# Patient Record
Sex: MALE | Race: WHITE | NOT HISPANIC OR LATINO | Employment: OTHER | URBAN - NONMETROPOLITAN AREA
[De-identification: names, ages, dates, MRNs, and addresses within clinical notes are randomized per-mention and may not be internally consistent; named-entity substitution may affect disease eponyms.]

---

## 2018-07-18 ENCOUNTER — CLINICAL SUPPORT (OUTPATIENT)
Dept: CARDIOLOGY | Facility: CLINIC | Age: 48
End: 2018-07-18

## 2018-07-18 VITALS
HEART RATE: 82 BPM | SYSTOLIC BLOOD PRESSURE: 155 MMHG | OXYGEN SATURATION: 97 % | HEIGHT: 67 IN | WEIGHT: 216.4 LBS | BODY MASS INDEX: 33.97 KG/M2 | DIASTOLIC BLOOD PRESSURE: 84 MMHG

## 2018-07-18 DIAGNOSIS — G89.4 CHRONIC PAIN SYNDROME: Primary | ICD-10-CM

## 2018-07-18 DIAGNOSIS — Z79.891 ADMISSION FOR LONG-TERM OPIATE ANALGESIC USE: ICD-10-CM

## 2018-07-18 PROCEDURE — 93000 ELECTROCARDIOGRAM COMPLETE: CPT | Performed by: PHYSICIAN ASSISTANT

## 2018-07-18 NOTE — PROGRESS NOTES
PATIENT PRESENTED IN OFFICE WITH EKG ORDER FROM DR JOSE MANUEL HARPER AT [facility] FOR PAIN MANAGEMENT.

## 2018-07-18 NOTE — PROGRESS NOTES
Min Dipesh  1970 7/18/2018   ?   Chief Complaint   Patient presents with   • Medical Clearance     NURSE VISIT FOR EKG PER DR JOSE MANUEL HARPER   • Pain      ?   HPI:     PATIENT PRESENTED IN OFFICE AS A NURSE VISIT  EKG. ORDER REQUESTED BY DR JOSE MANUEL HARPER FROM [facility]. PATIENT DENIES HAVING ANY CHEST PAIN, PALPS OR SHORTNESS OF BREATH. EKG DONE AS ORDERED AND TO BE REVIEWED BY YAMILEX NIEVES PA-C.  VENU Shultz     ?   ?   ?   No current outpatient prescriptions on file.   ?   ?   Patient has no allergy information on record.       Procedures     ?   Assessment/Plan    ?   ?1. MEDICAL CLEARANCE, PAIN MANAGEMENT      2. CHRONIC PAIN SYNDROME      EKG REVIEWED BY IVON TATE AND FAXED TO DR JOSE MANUEL HARPER. VENU Shultz  ?

## 2020-02-06 ENCOUNTER — CLINICAL SUPPORT (OUTPATIENT)
Dept: CARDIOLOGY | Facility: CLINIC | Age: 50
End: 2020-02-06

## 2020-02-06 VITALS
HEIGHT: 67 IN | HEART RATE: 88 BPM | SYSTOLIC BLOOD PRESSURE: 186 MMHG | DIASTOLIC BLOOD PRESSURE: 92 MMHG | WEIGHT: 210 LBS | OXYGEN SATURATION: 97 % | BODY MASS INDEX: 32.96 KG/M2

## 2020-02-06 DIAGNOSIS — I10 HYPERTENSION, UNSPECIFIED TYPE: Primary | ICD-10-CM

## 2020-02-06 PROCEDURE — 93000 ELECTROCARDIOGRAM COMPLETE: CPT | Performed by: PHYSICIAN ASSISTANT

## 2020-02-06 RX ORDER — LISINOPRIL 5 MG/1
5 TABLET ORAL 2 TIMES DAILY
COMMUNITY
Start: 2020-01-16 | End: 2022-01-05

## 2020-02-06 RX ORDER — MYCOPHENOLATE MOFETIL 250 MG/1
500 CAPSULE ORAL 2 TIMES DAILY
COMMUNITY
Start: 2020-01-16 | End: 2022-04-19

## 2020-02-06 RX ORDER — INSULIN DEGLUDEC 200 U/ML
50 INJECTION, SOLUTION SUBCUTANEOUS DAILY
COMMUNITY
Start: 2019-12-20 | End: 2023-01-26

## 2020-02-06 RX ORDER — OXYCODONE HYDROCHLORIDE AND ACETAMINOPHEN 5; 325 MG/1; MG/1
TABLET ORAL AS NEEDED
COMMUNITY
Start: 2020-01-14

## 2020-02-06 RX ORDER — ACYCLOVIR 800 MG/1
800 TABLET ORAL 2 TIMES DAILY
COMMUNITY
Start: 2020-02-01 | End: 2022-01-05

## 2020-02-06 RX ORDER — FENOFIBRATE 160 MG/1
160 TABLET ORAL DAILY
COMMUNITY
Start: 2020-01-16

## 2020-02-06 RX ORDER — FLUDROCORTISONE ACETATE 0.1 MG/1
0.1 TABLET ORAL DAILY
COMMUNITY
Start: 2020-01-24 | End: 2022-01-05

## 2020-02-06 RX ORDER — SODIUM BICARBONATE 650 MG/1
650 TABLET ORAL 2 TIMES DAILY
COMMUNITY
Start: 2019-11-03 | End: 2022-01-05

## 2020-02-06 RX ORDER — TACROLIMUS 1 MG/1
0.5 CAPSULE ORAL 2 TIMES DAILY
COMMUNITY
Start: 2020-01-16

## 2020-02-06 RX ORDER — ROSUVASTATIN CALCIUM 10 MG/1
10 TABLET, COATED ORAL DAILY
COMMUNITY
Start: 2019-11-24 | End: 2022-01-05 | Stop reason: SDUPTHER

## 2020-02-06 RX ORDER — PREDNISONE 1 MG/1
5 TABLET ORAL EVERY OTHER DAY
COMMUNITY
Start: 2020-01-16

## 2020-02-06 RX ORDER — ESCITALOPRAM OXALATE 20 MG/1
TABLET ORAL DAILY
COMMUNITY
Start: 2019-11-19 | End: 2022-01-05

## 2020-02-06 RX ORDER — INSULIN ASPART 100 [IU]/ML
10 INJECTION, SOLUTION INTRAVENOUS; SUBCUTANEOUS
COMMUNITY
Start: 2019-12-20 | End: 2023-01-26

## 2020-02-06 RX ORDER — AMLODIPINE BESYLATE 5 MG/1
5 TABLET ORAL DAILY
COMMUNITY
Start: 2020-01-16 | End: 2022-01-05

## 2020-02-06 NOTE — PROGRESS NOTES
Min Landon  1970 2/6/2020   ?   Chief Complaint   Patient presents with   • nurse visit     EKG for [facility]      ?   HPI:   ? Patient presents for EKG for [facility]. EKG to be shown to Jan BLACKMAN and interpreted. Patient has no complaints today. Li Flannery LPN  ?   ?     Current Outpatient Medications:   •  acyclovir (ZOVIRAX) 800 MG tablet, Take 800 mg by mouth 2 (Two) Times a Day., Disp: , Rfl:   •  amLODIPine (NORVASC) 5 MG tablet, Take 5 mg by mouth Daily., Disp: , Rfl:   •  diclofenac (VOLTAREN) 1 % gel gel, As Needed., Disp: , Rfl:   •  escitalopram (LEXAPRO) 20 MG tablet, Daily., Disp: , Rfl:   •  fenofibrate 160 MG tablet, Take 160 mg by mouth Daily., Disp: , Rfl:   •  fludrocortisone 0.1 MG tablet, Take 0.1 mg by mouth Daily., Disp: , Rfl:   •  lisinopril (PRINIVIL,ZESTRIL) 5 MG tablet, Take 5 mg by mouth 2 (Two) Times a Day., Disp: , Rfl:   •  mycophenolate (CELLCEPT) 250 MG capsule, Take 250 mg by mouth 2 (Two) Times a Day. 2 caps bid, Disp: , Rfl:   •  NOVOLOG FLEXPEN 100 UNIT/ML solution pen-injector sc pen, 10 Units. And sliding scale, Disp: , Rfl:   •  oxyCODONE-acetaminophen (PERCOCET) 5-325 MG per tablet, As Needed., Disp: , Rfl:   •  predniSONE (DELTASONE) 5 MG tablet, Take 5 mg by mouth Every Other Day., Disp: , Rfl:   •  rosuvastatin (CRESTOR) 10 MG tablet, Take 10 mg by mouth Daily., Disp: , Rfl:   •  sodium bicarbonate 650 MG tablet, Take 650 mg by mouth 2 (Two) Times a Day., Disp: , Rfl:   •  tacrolimus (PROGRAF) 1 MG capsule, Take 1 mg by mouth 2 (Two) Times a Day., Disp: , Rfl:   •  TRESIBA FLEXTOUCH 200 UNIT/ML solution pen-injector pen injection, 50 Units Daily., Disp: , Rfl:    ?   ?   Sulfamethoxazole-trimethoprim         ECG 12 Lead  Date/Time: 2/6/2020 4:52 PM  Performed by: Jan Wilson PA  Authorized by: Jan Wilson PA   Comparison: compared with previous ECG from 7/18/2018               ?    Assessment/Plan    ?1. EKG    EKG reviewed and interpreted by Jan BLACKMAN, faxed to [facility] Fax number [phone] Phone number [phone].  Li Flannery LPN  ?   ?

## 2022-01-05 ENCOUNTER — OFFICE VISIT (OUTPATIENT)
Dept: CARDIOLOGY | Facility: CLINIC | Age: 52
End: 2022-01-05

## 2022-01-05 VITALS
WEIGHT: 178.2 LBS | HEIGHT: 67 IN | HEART RATE: 102 BPM | SYSTOLIC BLOOD PRESSURE: 137 MMHG | DIASTOLIC BLOOD PRESSURE: 84 MMHG | BODY MASS INDEX: 27.97 KG/M2 | OXYGEN SATURATION: 97 %

## 2022-01-05 DIAGNOSIS — R06.02 SOB (SHORTNESS OF BREATH): ICD-10-CM

## 2022-01-05 DIAGNOSIS — R94.39 ABNORMAL STRESS TEST: Primary | ICD-10-CM

## 2022-01-05 DIAGNOSIS — R00.2 PALPITATIONS: ICD-10-CM

## 2022-01-05 DIAGNOSIS — I10 ESSENTIAL HYPERTENSION: ICD-10-CM

## 2022-01-05 PROCEDURE — 99204 OFFICE O/P NEW MOD 45 MIN: CPT | Performed by: NURSE PRACTITIONER

## 2022-01-05 RX ORDER — ASPIRIN 81 MG/1
81 TABLET ORAL DAILY
COMMUNITY
End: 2022-02-17 | Stop reason: SDUPTHER

## 2022-01-05 RX ORDER — METOPROLOL SUCCINATE 25 MG/1
12.5 TABLET, EXTENDED RELEASE ORAL DAILY
Qty: 15 TABLET | Refills: 11 | Status: SHIPPED | OUTPATIENT
Start: 2022-01-05 | End: 2022-04-19

## 2022-01-05 RX ORDER — ROSUVASTATIN CALCIUM 20 MG/1
20 TABLET, COATED ORAL DAILY
Qty: 90 TABLET | Refills: 3 | Status: SHIPPED | OUTPATIENT
Start: 2022-01-05 | End: 2022-11-03

## 2022-01-05 RX ORDER — PANTOPRAZOLE SODIUM 40 MG/1
40 TABLET, DELAYED RELEASE ORAL DAILY
COMMUNITY
Start: 2021-12-20

## 2022-01-05 RX ORDER — CLOPIDOGREL BISULFATE 75 MG/1
75 TABLET ORAL DAILY
Qty: 30 TABLET | Refills: 11 | Status: SHIPPED | OUTPATIENT
Start: 2022-01-05 | End: 2022-12-05

## 2022-01-05 NOTE — PATIENT INSTRUCTIONS
Acute Coronary Syndrome  Acute coronary syndrome (ACS) is a serious problem in which there is suddenly not enough blood and oxygen reaching the heart. ACS can result in chest pain or a heart attack.  This condition is a medical emergency. If you have any symptoms of this condition, get help right away.  What are the causes?  This condition may be caused by:  · A buildup of fat and cholesterol inside the arteries (atherosclerosis). This is the most common cause. The buildup (plaque) can cause blood vessels in the heart (coronary arteries) to become narrow or blocked, which reduces blood flow to the heart. Plaque can also break off and lead to a clot, which can block an artery and cause a heart attack or stroke.  · Sudden tightening of the muscles around the coronary arteries (coronary spasm).  · Tearing of a coronary artery (spontaneous coronary artery dissection).  · Very low blood pressure (hypotension).  · An abnormal heartbeat (arrhythmia).  · Other medical conditions that cause a decrease of oxygen to the heart, such as anemiaorrespiratory failure.  · Using cocaine or methamphetamine.  What increases the risk?  The following factors may make you more likely to develop this condition:  · Age. The risk for ACS increases as you get older.  · History of chest pain, heart attack, peripheral artery disease, or stroke.  · Having taken chemotherapy or immune-suppressing medicines.  · Being male.  · Family history of chest pain, heart disease, or stroke.  · Smoking.  · Not exercising enough.  · Being overweight.  · High cholesterol.  · High blood pressure (hypertension).  · Diabetes.  · Excessive alcohol use.  What are the signs or symptoms?  Common symptoms of this condition include:  · Chest pain. The pain may last a long time, or it may stop and come back (recur). It may feel like:  ? Crushing or squeezing.  ? Tightness, pressure, fullness, or heaviness.  · Arm, neck, jaw, or back pain.  · Heartburn or  indigestion.  · Shortness of breath.  · Nausea.  · Sudden cold sweats.  · Light-headedness.  · Dizziness or passing out.  · Tiredness (fatigue).  Sometimes there are no symptoms.  How is this diagnosed?  This condition may be diagnosed based on:  · Your medical history and symptoms.  · Imaging tests, such as:  ? An electrocardiogram (ECG). This measures the heart's electrical activity.  ? X-rays.  ? CT scan.  ? A coronary angiogram. For this test, dye is injected into the heart arteries and then X-rays are taken.  ? Myocardial perfusion imaging. This test shows how well blood flows through your heart muscle.  · Blood tests. These may be repeated at certain time intervals.  · Exercise stress testing.  · Echocardiogram. This is a test that uses sound waves to produce detailed images of the heart.  How is this treated?  Treatment for this condition may include:  · Oxygen therapy.  · Medicines, such as:  ? Antiplatelet medicines and blood-thinning medicines, such as aspirin. These help prevent blood clots.  ? Medicine that dissolves any blood clots (fibrinolytic therapy).  ? Blood pressure medicines.  ? Nitroglycerin. This helps widen blood vessels to improve blood flow.  ? Pain medicine.  ? Cholesterol-lowering medicine.  · Surgery, such as:  ? Coronary angioplasty with stent placement. This involves placing a small piece of metal that looks like mesh or a spring into a narrow coronary artery. This widens the artery and keeps it open.  ? Coronary artery bypass surgery. This involves taking a section of a blood vessel from a different part of your body and placing it on the blocked coronary artery to allow blood to flow around the blockage.  · Cardiac rehabilitation. This is a program that includes exercise training, education, and counseling to help you recover.  Follow these instructions at home:  Eating and drinking  · Eat a heart-healthy diet that includes whole grains, fruits and vegetables, lean proteins, and  low-fat or nonfat dairy products.  · Limit how much salt (sodium) you eat as told by your health care provider. Follow instructions from your health care provider about any other eating or drinking restrictions, such as limiting foods that are high in fat and processed sugars.  · Use healthy cooking methods such as roasting, grilling, broiling, baking, poaching, steaming, or stir-frying.  · Work with a dietitian to follow a heart-healthy eating plan.  Medicines  · Take over-the-counter and prescription medicines only as told by your health care provider.  · Do not take these medicines unless your health care provider approves:  ? Vitamin supplements that contain vitamin A or vitamin E.  ? NSAIDs, such as ibuprofen, naproxen, or celecoxib.  ? Hormone replacement therapy that contains estrogen.  · If you are taking blood thinners:  ? Talk with your health care provider before you take any medicines that contain aspirin or NSAIDs. These medicines increase your risk for dangerous bleeding.  ? Take your medicine exactly as told, at the same time every day.  ? Avoid activities that could cause injury or bruising, and follow instructions about how to prevent falls.  ? Wear a medical alert bracelet, and carry a card that lists what medicines you take.  Activity  · Follow your cardiac rehabilitation program. Do exercises as told by your physical therapist.  · Ask your health care provider what activities and exercises are safe for you. Follow his or her instructions about lifting, driving, or climbing stairs.  Lifestyle  · Do not use any products that contain nicotine or tobacco, such as cigarettes, e-cigarettes, and chewing tobacco. If you need help quitting, ask your health care provider.  · Do not drink alcohol if your health care provider tells you not to drink.  · If you drink alcohol:  ? Limit how much you have to 0-1 drink a day.  ? Be aware of how much alcohol is in your drink. In the [place] one drink equals one 12 oz  bottle of beer (355 mL), one 5 oz glass of wine (148 mL), or one 1½ oz glass of hard liquor (44 mL).  · Maintain a healthy weight. If you need to lose weight, work with your health care provider to do so safely.  General instructions  · Tell all the health care providers who provide care for you about your heart condition, including your dentist. This may affect the medicines or treatment you receive.  · Manage any other health conditions you have, such as hypertension or diabetes. These conditions affect your heart.  · Pay attention to your mental health. You may be at higher risk for depression.  ? Find ways to manage stress.  ? Talk to your health care provider about depression screening and treatment.  · Keep your vaccinations up to date.  ? Get the flu shot (influenza vaccine) every year.  ? Get the pneumococcal vaccine if you are age 65 or older.  · If directed, monitor your blood pressure at home.  · Keep all follow-up visits as told by your health care provider. This is important.  Contact a health care provider if you:  · Feel overwhelmed or sad.  · Have trouble doing your daily activities.  Get help right away if you:  · Have pain in your chest, neck, arm, jaw, stomach, or back that recurs, and:  ? It lasts for more than a few minutes.  ? It is not relieved by taking the medicineyour health care provider prescribed.  · Have unexplained:  ? Heavy sweating.  ? Heartburn or indigestion.  ? Nausea or vomiting.  ? Shortness of breath.  ? Difficulty breathing.  ? Fatigue.  ? Nervousness or anxiety.  ? Weakness.  ? Diarrhea.  ? Dark stools or blood in your stool.  · Have sudden light-headedness or dizziness.  · Have blood pressure that is higher than 180/120.  · Faint.  · Have thoughts about hurting yourself.  These symptoms may represent a serious problem that is an emergency. Do not wait to see if the symptoms will go away. Get medical help right away. Call your local emergency services (911 in the [place] Do  not drive yourself to the hospital.   Summary  · Acute coronary syndrome (ACS) is when there is not enough blood and oxygen being supplied to the heart. ACS can result in chest pain or a heart attack.  · Acute coronary syndrome is a medical emergency. If you have any symptoms of this condition, get help right away.  · Treatment includes medicines and procedures to open the blocked arteries and restore blood flow.  This information is not intended to replace advice given to you by your health care provider. Make sure you discuss any questions you have with your health care provider.  Document Revised: 05/20/2020 Document Reviewed: 12/30/2019  [employer] ([employer]) Patient Education © 2021 [employer]      Coronary Angiogram With Stent  Coronary angiogram with stent placement is a procedure to widen or open a narrow blood vessel of the heart (coronary artery). Arteries may become blocked by cholesterol buildup (plaques) in the lining of the artery wall. When a coronary artery becomes partially blocked, blood flow to that area decreases. This may lead to chest pain or a heart attack (myocardial infarction).  A stent is a small piece of metal that looks like mesh or spring. Stent placement may be done as treatment after a heart attack, or to prevent a heart attack if a blocked artery is found by a coronary angiogram.  Let your health care provider know about:  · Any allergies you have, including allergies to medicines or contrast dye.  · All medicines you are taking, including vitamins, herbs, eye drops, creams, and over-the-counter medicines.  · Any problems you or family members have had with anesthetic medicines.  · Any blood disorders you have.  · Any surgeries you have had.  · Any medical conditions you have, including kidney problems or kidney failure.  · Whether you are pregnant or may be pregnant.  · Whether you are breastfeeding.  What are the risks?  Generally, this is a safe procedure. However, serious problems may  occur, including:  · Damage to nearby structures or organs, such as the heart, blood vessels, or kidneys.  · A return of blockage.  · Bleeding, infection, or bruising at the insertion site.  · A collection of blood under the skin (hematoma) at the insertion site.  · A blood clot in another part of the body.  · Allergic reaction to medicines or dyes.  · Bleeding into the abdomen (retroperitoneal bleeding).  · Stroke (rare).  · Heart attack (rare).  What happens before the procedure?  Staying hydrated  Follow instructions from your health care provider about hydration, which may include:  · Up to 2 hours before the procedure - you may continue to drink clear liquids, such as water, clear fruit juice, black coffee, and plain tea.    Eating and drinking restrictions  Follow instructions from your health care provider about eating and drinking, which may include:  · 8 hours before the procedure - stop eating heavy meals or foods, such as meat, fried foods, or fatty foods.  · 6 hours before the procedure - stop eating light meals or foods, such as toast or cereal.  · 2 hours before the procedure - stop drinking clear liquids.  Medicines  Ask your health care provider about:  · Changing or stopping your regular medicines. This is especially important if you are taking diabetes medicines or blood thinners.  · Taking medicines such as aspirin and ibuprofen. These medicines can thin your blood. Do not take these medicines unless your health care provider tells you to take them.  ? Generally, aspirin is recommended before a thin tube, called a catheter, is passed through a blood vessel and inserted into the heart (cardiac catheterization).  · Taking over-the-counter medicines, vitamins, herbs, and supplements.  General instructions  · Do not use any products that contain nicotine or tobacco for at least 4 weeks before the procedure. These products include cigarettes, e-cigarettes, and chewing tobacco. If you need help  quitting, ask your health care provider.  · Plan to have someone take you home from the hospital or clinic.  · If you will be going home right after the procedure, plan to have someone with you for 24 hours.  · You may have tests and imaging procedures.  · Ask your health care provider:  ? How your insertion site will be marked. Ask which artery will be used for the procedure.  ? What steps will be taken to help prevent infection. These may include:  § Removing hair at the insertion site.  § Washing skin with a germ-killing soap.  § Taking antibiotic medicine.  What happens during the procedure?    · An IV will be inserted into one of your veins.  · Electrodes may be placed on your chest to monitor your heart rate during the procedure.  · You will be given one or more of the following:  ? A medicine to help you relax (sedative).  ? A medicine to numb the area (local anesthetic) for catheter insertion.  · A small incision will be made for catheter insertion.  · The catheter will be inserted into an artery using a guide wire. The location may be in your groin, your wrist, or the fold of your arm (near your elbow).  · An X-ray procedure (fluoroscopy) will be used to help guide the catheter to the opening of the heart arteries.  · A dye will be injected into the catheter. X-rays will be taken. The dye helps to show where any narrowing or blockages are located in the arteries.  · Tell your health care provider if you have chest pain or trouble breathing.  · A tiny wire will be guided to the blocked spot, and a balloon will be inflated to make the artery wider.  · The stent will be expanded to crush the plaques into the wall of the vessel. The stent will hold the area open and improve the blood flow. Most stents have a drug coating to reduce the risk of the stent narrowing over time.  · The artery may be made wider using a drill, laser, or other tools that remove plaques.  · The catheter will be removed when the blood  flow improves. The stent will stay where it was placed, and the lining of the artery will grow over it.  · A bandage (dressing) will be placed on the insertion site. Pressure will be applied to stop bleeding.  · The IV will be removed.  This procedure may vary among health care providers and hospitals.  What happens after the procedure?  · Your blood pressure, heart rate, breathing rate, and blood oxygen level will be monitored until you leave the hospital or clinic.  · If the procedure is done through the leg, you will lie flat in bed for a few hours or for as long as told by your health care provider. You will be instructed not to bend or cross your legs.  · The insertion site and the pulse in your foot or wrist will be checked often.  · You may have more blood tests, X-rays, and a test that records the electrical activity of your heart (electrocardiogram, or ECG).  · Do not drive for 24 hours if you were given a sedative during your procedure.  Summary  · Coronary angiogram with stent placement is a procedure to widen or open a narrowed coronary artery. This is done to treat heart problems.  · Before the procedure, let your health care provider know about all the medical conditions and surgeries you have or have had.  · This is a safe procedure. However, some problems may occur, including damage to nearby structures or organs, bleeding, blood clots, or allergies.  · Follow your health care provider's instructions about eating, drinking, medicines, and other lifestyle changes, such as quitting tobacco use before the procedure.  This information is not intended to replace advice given to you by your health care provider. Make sure you discuss any questions you have with your health care provider.  Document Revised: 07/08/2020 Document Reviewed: 07/08/2020  [employer] Patient Education © 2021 [employer]      Hypertension, Adult  Hypertension is another name for high blood pressure. High blood pressure forces your  heart to work harder to pump blood. This can cause problems over time.  There are two numbers in a blood pressure reading. There is a top number (systolic) over a bottom number (diastolic). It is best to have a blood pressure that is below 120/80. Healthy choices can help lower your blood pressure, or you may need medicine to help lower it.  What are the causes?  The cause of this condition is not known. Some conditions may be related to high blood pressure.  What increases the risk?  · Smoking.  · Having type 2 diabetes mellitus, high cholesterol, or both.  · Not getting enough exercise or physical activity.  · Being overweight.  · Having too much fat, sugar, calories, or salt (sodium) in your diet.  · Drinking too much alcohol.  · Having long-term (chronic) kidney disease.  · Having a family history of high blood pressure.  · Age. Risk increases with age.  · Race. You may be at higher risk if you are .  · Gender. Men are at higher risk than women before age 45. After age 65, women are at higher risk than men.  · Having obstructive sleep apnea.  · Stress.  What are the signs or symptoms?  · High blood pressure may not cause symptoms. Very high blood pressure (hypertensive crisis) may cause:  ? Headache.  ? Feelings of worry or nervousness (anxiety).  ? Shortness of breath.  ? Nosebleed.  ? A feeling of being sick to your stomach (nausea).  ? Throwing up (vomiting).  ? Changes in how you see.  ? Very bad chest pain.  ? Seizures.  How is this treated?  · This condition is treated by making healthy lifestyle changes, such as:  ? Eating healthy foods.  ? Exercising more.  ? Drinking less alcohol.  · Your health care provider may prescribe medicine if lifestyle changes are not enough to get your blood pressure under control, and if:  ? Your top number is above 130.  ? Your bottom number is above 80.  · Your personal target blood pressure may vary.  Follow these instructions at home:  Eating and  drinking    · If told, follow the DASH eating plan. To follow this plan:  ? Fill one half of your plate at each meal with fruits and vegetables.  ? Fill one fourth of your plate at each meal with whole grains. Whole grains include whole-wheat pasta, brown rice, and whole-grain bread.  ? Eat or drink low-fat dairy products, such as skim milk or low-fat yogurt.  ? Fill one fourth of your plate at each meal with low-fat (lean) proteins. Low-fat proteins include fish, chicken without skin, eggs, beans, and tofu.  ? Avoid fatty meat, cured and processed meat, or chicken with skin.  ? Avoid pre-made or processed food.  · Eat less than 1,500 mg of salt each day.  · Do not drink alcohol if:  ? Your doctor tells you not to drink.  ? You are pregnant, may be pregnant, or are planning to become pregnant.  · If you drink alcohol:  ? Limit how much you use to:  § 0-1 drink a day for women.  § 0-2 drinks a day for men.  ? Be aware of how much alcohol is in your drink. In the [place] one drink equals one 12 oz bottle of beer (355 mL), one 5 oz glass of wine (148 mL), or one 1½ oz glass of hard liquor (44 mL).    Lifestyle    · Work with your doctor to stay at a healthy weight or to lose weight. Ask your doctor what the best weight is for you.  · Get at least 30 minutes of exercise most days of the week. This may include walking, swimming, or biking.  · Get at least 30 minutes of exercise that strengthens your muscles (resistance exercise) at least 3 days a week. This may include lifting weights or doing Pilates.  · Do not use any products that contain nicotine or tobacco, such as cigarettes, e-cigarettes, and chewing tobacco. If you need help quitting, ask your doctor.  · Check your blood pressure at home as told by your doctor.  · Keep all follow-up visits as told by your doctor. This is important.    Medicines  · Take over-the-counter and prescription medicines only as told by your doctor. Follow directions carefully.  · Do not  skip doses of blood pressure medicine. The medicine does not work as well if you skip doses. Skipping doses also puts you at risk for problems.  · Ask your doctor about side effects or reactions to medicines that you should watch for.  Contact a doctor if you:  · Think you are having a reaction to the medicine you are taking.  · Have headaches that keep coming back (recurring).  · Feel dizzy.  · Have swelling in your ankles.  · Have trouble with your vision.  Get help right away if you:  · Get a very bad headache.  · Start to feel mixed up (confused).  · Feel weak or numb.  · Feel faint.  · Have very bad pain in your:  ? Chest.  ? Belly (abdomen).  · Throw up more than once.  · Have trouble breathing.  Summary  · Hypertension is another name for high blood pressure.  · High blood pressure forces your heart to work harder to pump blood.  · For most people, a normal blood pressure is less than 120/80.  · Making healthy choices can help lower blood pressure. If your blood pressure does not get lower with healthy choices, you may need to take medicine.  This information is not intended to replace advice given to you by your health care provider. Make sure you discuss any questions you have with your health care provider.  Document Revised: 08/28/2019 Document Reviewed: 08/28/2019  [employer] Patient Education © 2021 [employer]

## 2022-01-05 NOTE — PROGRESS NOTES
Subjective     Min Landon is a 51 y.o. male who presents to day for Establish Care (presents as [facility] f/u abn stress), Palpitations, and Shortness of Breath.    CHIEF COMPLIANT  Chief Complaint   Patient presents with   • Establish Care     presents as [facility] f/u abn stress   • Palpitations   • Shortness of Breath       Active Problems:  Problem List Items Addressed This Visit     None      Visit Diagnoses     Abnormal stress test    -  Primary    Relevant Medications    rosuvastatin (CRESTOR) 20 MG tablet    clopidogrel (PLAVIX) 75 MG tablet    Other Relevant Orders    [facility]    CBC & Differential    Basic Metabolic Panel    SOB (shortness of breath)        Relevant Medications    rosuvastatin (CRESTOR) 20 MG tablet    clopidogrel (PLAVIX) 75 MG tablet    Other Relevant Orders    [facility]    CBC & Differential    Basic Metabolic Panel    Palpitations        Relevant Medications    rosuvastatin (CRESTOR) 20 MG tablet    clopidogrel (PLAVIX) 75 MG tablet    Other Relevant Orders    [facility]    CBC & Differential    Basic Metabolic Panel    Essential hypertension        Relevant Medications    rosuvastatin (CRESTOR) 20 MG tablet    clopidogrel (PLAVIX) 75 MG tablet    metoprolol succinate XL (TOPROL-XL) 25 MG 24 hr tablet    Other Relevant Orders    [facility]    CBC & Differential    Basic Metabolic Panel      Problem list  1.  Shortness of breath  1.1 stress test 11/21: Fixed moderate size perfusion defect lateral myocardium from the apex to the base involving portions of the anterior and inferior myocardium tiny component of reversibility at the apex of the myocardium.  Decreased wall motion at the apex and septum decreased myocardial wall thickening at the apex and mid lateral wall, EF slightly low at 48%  1.2 echocardiogram 11/21: EF 60 to 65%, elevated left atrial pressures, mild concentric left ventricular hypertrophy, mild MR and TR, trace to mild PI, RVSP 37  2.   Palpitations  3.  Diabetes mellitus  4.  Hyperlipidemia  5.  Hypertension  6.  Chronic kidney disease on dialysis     HPI  HPI  Mr. Landon is a 51-year-old male patient who is being seen today to reestablish care for cardiac evaluation due to positive stress test.  Patient did undergo a nuclear stress test at [facility] that identified a Fixed moderate size perfusion defect lateral myocardium from the apex to the base involving portions of the anterior and inferior myocardium tiny component of reversibility at the apex of the myocardium.  Decreased wall motion at the apex and septum decreased myocardial wall thickening at the apex and mid lateral wall, EF slightly low at 48%.  His echocardiogram identified an ejection fraction of 60 to 65% with diastolic dysfunction.  He did restart dialysis back in the end of October due to failing of his kidney transplant.  He says after dialysis his blood pressure dropped and he had stopped taking his self off the carvedilol over the last 2 weeks.  He says his blood pressure is dropping to 90/60 and was feeling very fatigued and weak.  During the acute phase of this he did have to have a pleurocentesis x3 where they took out 1 L off his left lung, 2 L off his right lung, suspect third occasion take another liter off the left lung.  He does report that since this that his shortness of breath has improved somewhat.  His shortness of breath usually only occurs with activity and does not occur at rest.  With the pleurocentesis his orthopnea has also resolved.  Patient also has intermittent palpitations in which she has an anxious type feeling that occurs intermittently in his chest.  It does not interfere with his activities of daily living and mainly occurs at rest.  He did have noted PVCs during his echocardiogram.  He is currently being followed by Dr. Hernández for his chronic renal disease and which requires dialysis with history of renal transplant.  He does report significant fatigue  where he gets tired easily.  The only time he experiences lightheadedness is when his blood pressure drops after dialysis.  He denies chest pain, lower extremity edema, dizziness, syncope, PND, orthopnea, strokelike symptoms.  We did review his stress test echocardiogram in detail he denied any questions.  PRIOR MEDS  Current Outpatient Medications on File Prior to Visit   Medication Sig Dispense Refill   • aspirin 81 MG EC tablet Take 81 mg by mouth Daily.     • fenofibrate 160 MG tablet Take 160 mg by mouth Daily.     • mycophenolate (CELLCEPT) 250 MG capsule Take 250 mg by mouth 2 (Two) Times a Day. 2 caps bid     • NOVOLOG FLEXPEN 100 UNIT/ML solution pen-injector sc pen 10 Units. And sliding scale     • oxyCODONE-acetaminophen (PERCOCET) 5-325 MG per tablet As Needed.     • pantoprazole (PROTONIX) 40 MG EC tablet Take 40 mg by mouth Daily.     • predniSONE (DELTASONE) 5 MG tablet Take 5 mg by mouth Every Other Day.     • tacrolimus (PROGRAF) 1 MG capsule Take 1 mg by mouth 2 (Two) Times a Day.     • TRESIBA FLEXTOUCH 200 UNIT/ML solution pen-injector pen injection 50 Units Daily.     • [DISCONTINUED] rosuvastatin (CRESTOR) 10 MG tablet Take 10 mg by mouth Daily.     • [DISCONTINUED] acyclovir (ZOVIRAX) 800 MG tablet Take 800 mg by mouth 2 (Two) Times a Day.     • [DISCONTINUED] amLODIPine (NORVASC) 5 MG tablet Take 5 mg by mouth Daily.     • [DISCONTINUED] diclofenac (VOLTAREN) 1 % gel gel As Needed.     • [DISCONTINUED] escitalopram (LEXAPRO) 20 MG tablet Daily.     • [DISCONTINUED] fludrocortisone 0.1 MG tablet Take 0.1 mg by mouth Daily.     • [DISCONTINUED] lisinopril (PRINIVIL,ZESTRIL) 5 MG tablet Take 5 mg by mouth 2 (Two) Times a Day.     • [DISCONTINUED] sodium bicarbonate 650 MG tablet Take 650 mg by mouth 2 (Two) Times a Day.       No current facility-administered medications on file prior to visit.       ALLERGIES  Sulfamethoxazole-trimethoprim    HISTORY  Past Medical History:   Diagnosis Date   •  "Chronic kidney disease    • Diabetes mellitus (HCC)    • End stage renal disease (HCC)    • Hyperlipidemia    • Hypertension        Social History     Socioeconomic History   • Marital status:        History reviewed. No pertinent family history.    Review of Systems   Constitutional: Positive for fatigue. Negative for chills, diaphoresis and fever.   HENT: Negative.    Eyes: Negative.  Negative for visual disturbance.   Respiratory: Positive for shortness of breath (with increased activity). Negative for apnea, cough, chest tightness and wheezing.    Cardiovascular: Positive for palpitations (with anxiety). Negative for chest pain and leg swelling.   Gastrointestinal: Negative.  Negative for abdominal pain, blood in stool, constipation, diarrhea, nausea and vomiting.   Endocrine: Negative.    Genitourinary: Negative.  Negative for hematuria.        Dialysis patient    Musculoskeletal: Positive for arthralgias, back pain, myalgias and neck pain.   Skin: Negative.    Allergic/Immunologic: Negative.    Neurological: Positive for light-headedness (when BP drops, the last weeks after dialysis ). Negative for dizziness, syncope, weakness, numbness and headaches.   Hematological: Negative.  Does not bruise/bleed easily.   Psychiatric/Behavioral: Negative.  Negative for sleep disturbance (toss and turn).       Objective     VITALS: /84 (BP Location: Left arm, Patient Position: Sitting)   Pulse 102   Ht 170.2 cm (67\")   Wt 80.8 kg (178 lb 3.2 oz)   SpO2 97%   BMI 27.91 kg/m²     LABS:   Lab Results (most recent)     None          IMAGING:   No Images in the past 120 days found..    EXAM:  Physical Exam  Vitals and nursing note reviewed.   Constitutional:       Appearance: He is well-developed.   HENT:      Head: Normocephalic.   Eyes:      Pupils: Pupils are equal, round, and reactive to light.   Neck:      Thyroid: No thyroid mass.      Vascular: No carotid bruit or JVD.      Trachea: Trachea and " phonation normal.   Cardiovascular:      Rate and Rhythm: Normal rate and regular rhythm.      Pulses:           Radial pulses are 2+ on the right side and 2+ on the left side.        Posterior tibial pulses are 2+ on the right side and 2+ on the left side.      Heart sounds: Murmur heard.   No friction rub. No gallop.    Pulmonary:      Effort: Pulmonary effort is normal. No respiratory distress.      Breath sounds: Normal breath sounds. No wheezing or rales.   Abdominal:      General: Bowel sounds are normal.      Palpations: Abdomen is soft.   Musculoskeletal:         General: No swelling. Normal range of motion.      Cervical back: Neck supple.   Skin:     General: Skin is warm and dry.      Capillary Refill: Capillary refill takes less than 2 seconds.      Findings: No rash.          Neurological:      Mental Status: He is alert and oriented to person, place, and time.   Psychiatric:         Speech: Speech normal.         Behavior: Behavior normal.         Thought Content: Thought content normal.         Judgment: Judgment normal.         Procedure   Procedures       Assessment/Plan    Diagnosis Plan   1. Abnormal stress test  rosuvastatin (CRESTOR) 20 MG tablet    clopidogrel (PLAVIX) 75 MG tablet    [facility]    CBC & Differential    Basic Metabolic Panel   2. SOB (shortness of breath)  rosuvastatin (CRESTOR) 20 MG tablet    clopidogrel (PLAVIX) 75 MG tablet    [facility]    CBC & Differential    Basic Metabolic Panel   3. Palpitations  rosuvastatin (CRESTOR) 20 MG tablet    clopidogrel (PLAVIX) 75 MG tablet    [facility]    CBC & Differential    Basic Metabolic Panel   4. Essential hypertension  rosuvastatin (CRESTOR) 20 MG tablet    clopidogrel (PLAVIX) 75 MG tablet    [facility]    CBC & Differential    Basic Metabolic Panel    metoprolol succinate XL (TOPROL-XL) 25 MG 24 hr tablet   1.  Patient did have an abnormal stress test that identifiedFixed moderate size  perfusion defect lateral myocardium from the apex to the base involving portions of the anterior and inferior myocardium tiny component of reversibility at the apex of the myocardium.  Decreased wall motion at the apex and septum decreased myocardial wall thickening at the apex and mid lateral wall, EF slightly low at 48%.  He does have shortness of breath that has actually gotten somewhat better after pleurocentesis.  He still has shortness of breath with activity.  Due to his abnormal stress test and history of diabetes mellitus, hypertension, hyperlipidemia I do feel it is most appropriate for patient to proceed with left heart catheterization.  Benefits and risk of the procedure were explained and patient wishes to proceed.  He was advised to have labs done approximately 1 week prior to the left heart catheterization.  These can be done in conjunction with the labs that he routinely goes under with Dr. Hernández.  Also will prescribe Plavix 75 mg daily in which she will start 7 days prior to the left heart catheterization.  We will also increase his rosuvastatin to 20 mg daily for high intensity statin therapy.  2.  Patient's blood pressure is controlled on current blood pressure medication regimen.  Due to the fact patient was unable to tolerate the carvedilol and has a reduced ejection fraction on his stress test and tachycardia with a rate of 102 today we will start patient on metoprolol succinate 12.5 mg daily.  Patient advised to monitor blood pressure on a daily basis and report any persistent highs or lows.  Set goal blood pressure for patient at 130/80 or below.  3.  Informed of signs and symptoms of ACS and advised to seek emergent treatment for any new worsening symptoms.  Patient also advised sooner follow-up as needed.  Also advised to follow-up with family doctor as needed  This note is dictated utilizing voice recognition software.  Although this record has been proof read, transcriptional errors may  still be present. If questions occur regarding the content of this record please do not hesitate to call our office.  I have reviewed and confirmed the accuracy of the ROS as documented by the MA/LPN/RN RASHAD Garza    Return if symptoms worsen or fail to improve, for cath follow up 1-2 weeks.    Diagnoses and all orders for this visit:    1. Abnormal stress test (Primary)  -     rosuvastatin (CRESTOR) 20 MG tablet; Take 1 tablet by mouth Daily.  Dispense: 90 tablet; Refill: 3  -     clopidogrel (PLAVIX) 75 MG tablet; Take 1 tablet by mouth Daily.  Dispense: 30 tablet; Refill: 11  -     [facility]; Future  -     CBC & Differential; Future  -     Basic Metabolic Panel; Future    2. SOB (shortness of breath)  -     rosuvastatin (CRESTOR) 20 MG tablet; Take 1 tablet by mouth Daily.  Dispense: 90 tablet; Refill: 3  -     clopidogrel (PLAVIX) 75 MG tablet; Take 1 tablet by mouth Daily.  Dispense: 30 tablet; Refill: 11  -     [facility]; Future  -     CBC & Differential; Future  -     Basic Metabolic Panel; Future    3. Palpitations  -     rosuvastatin (CRESTOR) 20 MG tablet; Take 1 tablet by mouth Daily.  Dispense: 90 tablet; Refill: 3  -     clopidogrel (PLAVIX) 75 MG tablet; Take 1 tablet by mouth Daily.  Dispense: 30 tablet; Refill: 11  -     [facility]; Future  -     CBC & Differential; Future  -     Basic Metabolic Panel; Future    4. Essential hypertension  -     rosuvastatin (CRESTOR) 20 MG tablet; Take 1 tablet by mouth Daily.  Dispense: 90 tablet; Refill: 3  -     clopidogrel (PLAVIX) 75 MG tablet; Take 1 tablet by mouth Daily.  Dispense: 30 tablet; Refill: 11  -     [facility]; Future  -     CBC & Differential; Future  -     Basic Metabolic Panel; Future  -     metoprolol succinate XL (TOPROL-XL) 25 MG 24 hr tablet; Take 0.5 tablets by mouth Daily.  Dispense: 15 tablet; Refill: 11               MEDS ORDERED DURING VISIT:  New Medications Ordered This Visit    Medications   • rosuvastatin (CRESTOR) 20 MG tablet     Sig: Take 1 tablet by mouth Daily.     Dispense:  90 tablet     Refill:  3   • clopidogrel (PLAVIX) 75 MG tablet     Sig: Take 1 tablet by mouth Daily.     Dispense:  30 tablet     Refill:  11   • metoprolol succinate XL (TOPROL-XL) 25 MG 24 hr tablet     Sig: Take 0.5 tablets by mouth Daily.     Dispense:  15 tablet     Refill:  11           This document has been electronically signed by Manfred Ascencio Jr., APRN  January 5, 2022 11:46 EST

## 2022-01-11 ENCOUNTER — TELEPHONE (OUTPATIENT)
Dept: CARDIOLOGY | Facility: CLINIC | Age: 52
End: 2022-01-11

## 2022-01-11 NOTE — TELEPHONE ENCOUNTER
Pt called and wanted to make sure it was okay to go ahead and start the Plavix because he thought it was 7 days prior. I told him it was fine to go ahead and start to have in his system.

## 2022-02-17 ENCOUNTER — OFFICE VISIT (OUTPATIENT)
Dept: CARDIOLOGY | Facility: CLINIC | Age: 52
End: 2022-02-17

## 2022-02-17 VITALS
BODY MASS INDEX: 29 KG/M2 | HEART RATE: 80 BPM | OXYGEN SATURATION: 99 % | HEIGHT: 67 IN | WEIGHT: 184.8 LBS | SYSTOLIC BLOOD PRESSURE: 158 MMHG | DIASTOLIC BLOOD PRESSURE: 93 MMHG

## 2022-02-17 DIAGNOSIS — I48.0 PAROXYSMAL ATRIAL FIBRILLATION: ICD-10-CM

## 2022-02-17 DIAGNOSIS — R00.2 PALPITATIONS: ICD-10-CM

## 2022-02-17 DIAGNOSIS — I25.10 CORONARY ATHEROSCLEROSIS DUE TO CALCIFIED CORONARY LESION: Primary | ICD-10-CM

## 2022-02-17 DIAGNOSIS — E78.2 MIXED HYPERLIPIDEMIA: ICD-10-CM

## 2022-02-17 DIAGNOSIS — I10 BENIGN ESSENTIAL HYPERTENSION: ICD-10-CM

## 2022-02-17 DIAGNOSIS — R06.02 SHORTNESS OF BREATH: ICD-10-CM

## 2022-02-17 DIAGNOSIS — I25.84 CORONARY ATHEROSCLEROSIS DUE TO CALCIFIED CORONARY LESION: Primary | ICD-10-CM

## 2022-02-17 PROBLEM — E78.5 HLD (HYPERLIPIDEMIA): Status: ACTIVE | Noted: 2022-02-17

## 2022-02-17 PROCEDURE — 99214 OFFICE O/P EST MOD 30 MIN: CPT | Performed by: NURSE PRACTITIONER

## 2022-02-17 RX ORDER — ISOSORBIDE MONONITRATE 30 MG/1
30 TABLET, EXTENDED RELEASE ORAL DAILY
Qty: 30 TABLET | Refills: 11 | Status: SHIPPED | OUTPATIENT
Start: 2022-02-17 | End: 2022-08-11

## 2022-02-17 RX ORDER — SEVELAMER CARBONATE 800 MG/1
800 TABLET, FILM COATED ORAL 3 TIMES DAILY
COMMUNITY
Start: 2022-02-08

## 2022-02-17 NOTE — PROGRESS NOTES
Subjective     Min Landon is a 51 y.o. male who presents to day for Follow-up (Here for [facility] f/u), Coronary Artery Disease, Hyperlipidemia, and Hypertension.    CHIEF COMPLIANT  Chief Complaint   Patient presents with   • Follow-up     Here for [facility] f/u   • Coronary Artery Disease   • Hyperlipidemia   • Hypertension     Problem list:    1.  Shortness of breath  1.1 stress test 11/21: Fixed moderate size perfusion defect lateral myocardium from the apex to the base involving portions of the anterior and inferior myocardium tiny component of reversibility at the apex of the myocardium.  Decreased wall motion at the apex and septum decreased myocardial wall thickening at the apex and mid lateral wall, EF slightly low at 48%  1.2 [facility], 1- per Dr. Saul. Unsuccessful attempt of  and revascularization of First Diag. Of the  LAD and First OM of Left Circ., distal Left Circ. 50%, PLAD 70%. Medical management  1.2 echocardiogram 1/22: EF 45 to 50% lateral hypokinesis  2.  Palpitations  3.  Diabetes mellitus  4.  Hyperlipidemia  5.  Hypertension  6.  Chronic kidney disease on dialysis  7. Atrial fibrillation [id number] score of 4, has bled score of 2    Problem List Items Addressed This Visit        Cardiac and Vasculature    Atherosclerosis of coronary artery - Primary    Relevant Medications    isosorbide mononitrate (IMDUR) 30 MG 24 hr tablet    Other Relevant Orders    Ambulatory Referral to Cardiology    Benign essential hypertension    Overview     Formatting of this note might be different from the original.    Essential (primary) hypertension         HLD (hyperlipidemia)    Palpitations       Pulmonary and Pneumonias    Shortness of breath      Other Visit Diagnoses     Paroxysmal atrial fibrillation (HCC)        Relevant Medications    apixaban (ELIQUIS) 5 MG tablet tablet    isosorbide mononitrate (IMDUR) 30 MG 24 hr tablet          HPI  Mr. Landon is a 51-year-old male patient who is being followed up  today with significant coronary artery disease in which she recently went under a left heart catheterization. Patient was found to have total occlusions to both the OM and the diagonal 1. These were attempted to be revascularized but was unsuccessful. He also had a lesion 70% in the proximal LAD that had an FFR of 0.85. The circumflex had 50% distal disease in the RCA had 20 to 40%. Patient did have a preserved ejection fraction of about 50%. He went underwent the ischemic evaluation due to the fact that after dialysis he went into a new onset atrial fibrillation. He said that he had a pretty rough episode the night before in which she had palpitations. He describes his palpitations as a racing-like sensation in his chest. They occur intermittently. He does have a [id number] score of 4 and a has bled score of 2. He does report chest pain that occurs intermittently in his chest. He also has some shortness of breath that occurs mainly with activity but can occur at rest. He also underwent under echocardiogram while he is in the hospital. It did show that his ejection fraction had reduced to 45 to 50%. He is currently on metoprolol 12.5 mg daily and is currently not on ACE and ARB due to renal function. He is on dual antiplatelet therapy of aspirin and Plavix. He is also on high intensity statin therapy of rosuvastatin 20 mg daily. He denies any lower extremity edema, dizziness, lightheadedness, syncope, PND, orthopnea, or strokelike symptoms. We did review his left heart catheterization and echocardiogram in detail and he denied any questions. We also reviewed the notes that were supplied from  his hospital stay.    PRIOR MEDS  Current Outpatient Medications on File Prior to Visit   Medication Sig Dispense Refill   • clopidogrel (PLAVIX) 75 MG tablet Take 1 tablet by mouth Daily. 30 tablet 11   • fenofibrate 160 MG tablet Take 160 mg by mouth Daily.     • metoprolol succinate XL (TOPROL-XL) 25 MG 24 hr tablet Take  0.5 tablets by mouth Daily. 15 tablet 11   • mycophenolate (CELLCEPT) 250 MG capsule Take 500 mg by mouth 2 (Two) Times a Day.     • NOVOLOG FLEXPEN 100 UNIT/ML solution pen-injector sc pen 10 Units. And sliding scale     • oxyCODONE-acetaminophen (PERCOCET) 5-325 MG per tablet As Needed.     • pantoprazole (PROTONIX) 40 MG EC tablet Take 40 mg by mouth Daily.     • predniSONE (DELTASONE) 5 MG tablet Take 5 mg by mouth Every Other Day.     • Renvela 800 MG tablet TAKE 2 TABLETS BY MOUTH THREE TIMES DAILY WITH MEALS     • rosuvastatin (CRESTOR) 20 MG tablet Take 1 tablet by mouth Daily. 90 tablet 3   • tacrolimus (PROGRAF) 1 MG capsule Take 1 mg by mouth 2 (Two) Times a Day.     • TRESIBA FLEXTOUCH 200 UNIT/ML solution pen-injector pen injection 50 Units Daily.       No current facility-administered medications on file prior to visit.       ALLERGIES  Sulfamethoxazole-trimethoprim    HISTORY  Past Medical History:   Diagnosis Date   • Calciphylaxis    • Chronic kidney disease    • Diabetes mellitus (HCC)    • End stage renal disease (HCC)    • Hyperlipidemia    • Hypertension        Social History     Socioeconomic History   • Marital status:    Tobacco Use   • Smoking status: Never Smoker   • Smokeless tobacco: Former User   Substance and Sexual Activity   • Alcohol use: Never   • Drug use: Never       History reviewed. No pertinent family history.    Review of Systems   Constitutional: Negative.    HENT: Negative.    Eyes: Positive for visual disturbance (glasses prn).   Respiratory: Positive for shortness of breath (mildly).    Cardiovascular: Positive for chest pain (occas. ) and palpitations. Negative for leg swelling.   Gastrointestinal: Negative.    Endocrine: Negative.    Genitourinary: Positive for decreased urine volume (dialysis 3 x weekly).   Musculoskeletal: Positive for arthralgias and myalgias.   Skin: Negative.    Allergic/Immunologic: Positive for environmental allergies.   Neurological:  "Negative.    Hematological: Negative.    Psychiatric/Behavioral: Positive for sleep disturbance.       Objective     VITALS: /93 (BP Location: Left arm, Patient Position: Sitting)   Pulse 80   Ht 170.2 cm (67.01\")   Wt 83.8 kg (184 lb 12.8 oz)   SpO2 99%   BMI 28.94 kg/m²     LABS:   Lab Results (most recent)     None          IMAGING:   No Images in the past 120 days found..    EXAM:  Physical Exam  Vitals and nursing note reviewed.   Constitutional:       Appearance: He is well-developed.   HENT:      Head: Normocephalic.   Eyes:      Pupils: Pupils are equal, round, and reactive to light.   Neck:      Thyroid: No thyroid mass.      Vascular: No carotid bruit or JVD.      Trachea: Trachea and phonation normal.   Cardiovascular:      Rate and Rhythm: Normal rate and regular rhythm.      Pulses:           Radial pulses are 2+ on the right side and 2+ on the left side.        Posterior tibial pulses are 2+ on the right side and 2+ on the left side.      Heart sounds: Murmur heard.   No friction rub. No gallop.    Pulmonary:      Effort: Pulmonary effort is normal. No respiratory distress.      Breath sounds: Normal breath sounds. No wheezing or rales.   Abdominal:      General: Bowel sounds are normal.      Palpations: Abdomen is soft.   Musculoskeletal:         General: Normal range of motion.      Cervical back: Neck supple.   Skin:     General: Skin is warm and dry.      Capillary Refill: Capillary refill takes less than 2 seconds.      Findings: No rash.   Neurological:      Mental Status: He is alert and oriented to person, place, and time.   Psychiatric:         Speech: Speech normal.         Behavior: Behavior normal.         Thought Content: Thought content normal.         Judgment: Judgment normal.       Procedure   Procedures       Assessment/Plan    Diagnosis Plan   1. Coronary atherosclerosis due to calcified coronary lesion  isosorbide mononitrate (IMDUR) 30 MG 24 hr tablet    Ambulatory " Referral to Cardiology   2. Benign essential hypertension     3. Mixed hyperlipidemia     4. Palpitations     5. Shortness of breath     6. Paroxysmal atrial fibrillation (HCC)  apixaban (ELIQUIS) 5 MG tablet tablet    1. Due to patient's significant coronary artery disease with 's of the diagonal and OM we will start patient on antianginal therapy of isosorbide. If patient continues to have chest pain despite antianginal therapy will consider referral to  clinic. With further discussion patient wishes to go ahead and be referred to  clinic and mobile for possible revascularization of both the diagonal and OM1 to help mitigate his symptoms.  2. Patient does have new onset paroxysmal atrial fibrillation in which he previously deferred anticoagulation with NOAC therapy. After further discussion and risks of stroke with a [id number] of 4 and a has bled of 2 we will prescribe Eliquis 5 mg twice daily. He does want to discuss this with his nephrologist prior to moving forward.  3. Patient's blood pressure is elevated today however he does have periods of hypotension especially after dialysis. So with the addition of isosorbide no other blood pressure medications will be added. He will continue to monitor his blood pressure on a routine basis report any significant highs or lows.  4. Patient does have heart failure with reduced ejection fraction. Due to chronic renal disease that requires dialysis he will not be placed on ACE or ARB or Arni therapy. He is on beta-blocker therapy metoprolol 12.5 mg daily.  5. Informed of signs and symptoms of ACS and advised to seek emergent treatment for any new worsening symptoms.  Patient also advised sooner follow-up as needed.  Also advised to follow-up with family doctor as needed  This note is dictated utilizing voice recognition software.  Although this record has been proof read, transcriptional errors may still be present. If questions occur regarding the content of  this record please do not hesitate to call our office.  I have reviewed and confirmed the accuracy of the ROS as documented by the MA/LPN/RN RASHAD Garza      No follow-ups on file.    Diagnoses and all orders for this visit:    1. Coronary atherosclerosis due to calcified coronary lesion (Primary)  -     isosorbide mononitrate (IMDUR) 30 MG 24 hr tablet; Take 1 tablet by mouth Daily.  Dispense: 30 tablet; Refill: 11  -     Ambulatory Referral to Cardiology    2. Benign essential hypertension    3. Mixed hyperlipidemia    4. Palpitations    5. Shortness of breath    6. Paroxysmal atrial fibrillation (HCC)  -     apixaban (ELIQUIS) 5 MG tablet tablet; Take 1 tablet by mouth 2 (Two) Times a Day.  Dispense: 60 tablet; Refill: 11        Min Landon  reports that he has never smoked. He has quit using smokeless tobacco.. I have educated him on the risk of diseases from using tobacco products such as cancer, COPD and heart disease.              Patient's Body mass index is 28.94 kg/m². indicating that he is overweight (BMI 25-29.9). Patient's (Body mass index is 28.94 kg/m².) indicates that they are overweight with health conditions that include hypertension, diabetes mellitus, dyslipidemias and CKD on dialysis, CAD . Weight is to be assessed at today's visit. BMI is pcp addressing. We discussed portion control and increasing exercise. .           MEDS ORDERED DURING VISIT:  New Medications Ordered This Visit   Medications   • apixaban (ELIQUIS) 5 MG tablet tablet     Sig: Take 1 tablet by mouth 2 (Two) Times a Day.     Dispense:  60 tablet     Refill:  11   • isosorbide mononitrate (IMDUR) 30 MG 24 hr tablet     Sig: Take 1 tablet by mouth Daily.     Dispense:  30 tablet     Refill:  11           This document has been electronically signed by Manfred Ascencio Jr., RASHAD  February 23, 2022 23:16 EST

## 2022-02-17 NOTE — PATIENT INSTRUCTIONS
Coronary Angiogram With Stent  Coronary angiogram with stent placement is a procedure to widen or open a narrow blood vessel of the heart (coronary artery). Arteries may become blocked by cholesterol buildup (plaques) in the lining of the artery wall. When a coronary artery becomes partially blocked, blood flow to that area decreases. This may lead to chest pain or a heart attack (myocardial infarction).  A stent is a small piece of metal that looks like mesh or spring. Stent placement may be done as treatment after a heart attack, or to prevent a heart attack if a blocked artery is found by a coronary angiogram.  Let your health care provider know about:  · Any allergies you have, including allergies to medicines or contrast dye.  · All medicines you are taking, including vitamins, herbs, eye drops, creams, and over-the-counter medicines.  · Any problems you or family members have had with anesthetic medicines.  · Any blood disorders you have.  · Any surgeries you have had.  · Any medical conditions you have, including kidney problems or kidney failure.  · Whether you are pregnant or may be pregnant.  · Whether you are breastfeeding.  What are the risks?  Generally, this is a safe procedure. However, serious problems may occur, including:  · Damage to nearby structures or organs, such as the heart, blood vessels, or kidneys.  · A return of blockage.  · Bleeding, infection, or bruising at the insertion site.  · A collection of blood under the skin (hematoma) at the insertion site.  · A blood clot in another part of the body.  · Allergic reaction to medicines or dyes.  · Bleeding into the abdomen (retroperitoneal bleeding).  · Stroke (rare).  · Heart attack (rare).  What happens before the procedure?  Staying hydrated  Follow instructions from your health care provider about hydration, which may include:  · Up to 2 hours before the procedure - you may continue to drink clear liquids, such as water, clear fruit juice,  black coffee, and plain tea.    Eating and drinking restrictions  Follow instructions from your health care provider about eating and drinking, which may include:  · 8 hours before the procedure - stop eating heavy meals or foods, such as meat, fried foods, or fatty foods.  · 6 hours before the procedure - stop eating light meals or foods, such as toast or cereal.  · 2 hours before the procedure - stop drinking clear liquids.  Medicines  Ask your health care provider about:  · Changing or stopping your regular medicines. This is especially important if you are taking diabetes medicines or blood thinners.  · Taking medicines such as aspirin and ibuprofen. These medicines can thin your blood. Do not take these medicines unless your health care provider tells you to take them.  ? Generally, aspirin is recommended before a thin tube, called a catheter, is passed through a blood vessel and inserted into the heart (cardiac catheterization).  · Taking over-the-counter medicines, vitamins, herbs, and supplements.  General instructions  · Do not use any products that contain nicotine or tobacco for at least 4 weeks before the procedure. These products include cigarettes, e-cigarettes, and chewing tobacco. If you need help quitting, ask your health care provider.  · Plan to have someone take you home from the hospital or clinic.  · If you will be going home right after the procedure, plan to have someone with you for 24 hours.  · You may have tests and imaging procedures.  · Ask your health care provider:  ? How your insertion site will be marked. Ask which artery will be used for the procedure.  ? What steps will be taken to help prevent infection. These may include:  § Removing hair at the insertion site.  § Washing skin with a germ-killing soap.  § Taking antibiotic medicine.  What happens during the procedure?    · An IV will be inserted into one of your veins.  · Electrodes may be placed on your chest to monitor your  heart rate during the procedure.  · You will be given one or more of the following:  ? A medicine to help you relax (sedative).  ? A medicine to numb the area (local anesthetic) for catheter insertion.  · A small incision will be made for catheter insertion.  · The catheter will be inserted into an artery using a guide wire. The location may be in your groin, your wrist, or the fold of your arm (near your elbow).  · An X-ray procedure (fluoroscopy) will be used to help guide the catheter to the opening of the heart arteries.  · A dye will be injected into the catheter. X-rays will be taken. The dye helps to show where any narrowing or blockages are located in the arteries.  · Tell your health care provider if you have chest pain or trouble breathing.  · A tiny wire will be guided to the blocked spot, and a balloon will be inflated to make the artery wider.  · The stent will be expanded to crush the plaques into the wall of the vessel. The stent will hold the area open and improve the blood flow. Most stents have a drug coating to reduce the risk of the stent narrowing over time.  · The artery may be made wider using a drill, laser, or other tools that remove plaques.  · The catheter will be removed when the blood flow improves. The stent will stay where it was placed, and the lining of the artery will grow over it.  · A bandage (dressing) will be placed on the insertion site. Pressure will be applied to stop bleeding.  · The IV will be removed.  This procedure may vary among health care providers and hospitals.  What happens after the procedure?  · Your blood pressure, heart rate, breathing rate, and blood oxygen level will be monitored until you leave the hospital or clinic.  · If the procedure is done through the leg, you will lie flat in bed for a few hours or for as long as told by your health care provider. You will be instructed not to bend or cross your legs.  · The insertion site and the pulse in your foot  or wrist will be checked often.  · You may have more blood tests, X-rays, and a test that records the electrical activity of your heart (electrocardiogram, or ECG).  · Do not drive for 24 hours if you were given a sedative during your procedure.  Summary  · Coronary angiogram with stent placement is a procedure to widen or open a narrowed coronary artery. This is done to treat heart problems.  · Before the procedure, let your health care provider know about all the medical conditions and surgeries you have or have had.  · This is a safe procedure. However, some problems may occur, including damage to nearby structures or organs, bleeding, blood clots, or allergies.  · Follow your health care provider's instructions about eating, drinking, medicines, and other lifestyle changes, such as quitting tobacco use before the procedure.  This information is not intended to replace advice given to you by your health care provider. Make sure you discuss any questions you have with your health care provider.  Document Revised: 07/08/2020 Document Reviewed: 07/08/2020  [employer] Patient Education © 2021 [employer]  BMI for Adults  What is BMI?  Body mass index (BMI) is a number that is calculated from a person's weight and height. BMI can help estimate how much of a person's weight is composed of fat. BMI does not measure body fat directly. Rather, it is an alternative to procedures that directly measure body fat, which can be difficult and expensive.  BMI can help identify people who may be at higher risk for certain medical problems.  What are BMI measurements used for?  BMI is used as a screening tool to identify possible weight problems. It helps determine whether a person is obese, overweight, a healthy weight, or underweight.  BMI is useful for:  · Identifying a weight problem that may be related to a medical condition or may increase the risk for medical problems.  · Promoting changes, such as changes in diet and  "exercise, to help reach a healthy weight. BMI screening can be repeated to see if these changes are working.  How is BMI calculated?  BMI involves measuring your weight in relation to your height. Both height and weight are measured, and the BMI is calculated from those numbers. This can be done either in English ([place] or metric measurements. Note that charts and online BMI calculators are available to help you find your BMI quickly and easily without having to do these calculations yourself.  To calculate your BMI in English ([place] measurements:    1. Measure your weight in pounds (lb).  2. Multiply the number of pounds by 703.  ? For example, for a person who weighs 180 lb, multiply that number by 703, which equals 126,540.  3. Measure your height in inches. Then multiply that number by itself to get a measurement called \"inches squared.\"  ? For example, for a person who is 70 inches tall, the \"inches squared\" measurement is 70 inches x 70 inches, which equals 4,900 inches squared.  4. Divide the total from step 2 (number of lb x 703) by the total from step 3 (inches squared): 126,540 ÷ 4,900 = 25.8. This is your BMI.    To calculate your BMI in metric measurements:  1. Measure your weight in kilograms (kg).  2. Measure your height in meters (m). Then multiply that number by itself to get a measurement called \"meters squared.\"  ? For example, for a person who is 1.75 m tall, the \"meters squared\" measurement is 1.75 m x 1.75 m, which is equal to 3.1 meters squared.  3. Divide the number of kilograms (your weight) by the meters squared number. In this example: 70 ÷ 3.1 = 22.6. This is your BMI.  What do the results mean?  BMI charts are used to identify whether you are underweight, normal weight, overweight, or obese. The following guidelines will be used:  · Underweight: BMI less than 18.5.  · Normal weight: BMI between 18.5 and 24.9.  · Overweight: BMI between 25 and 29.9.  · Obese: BMI of 30 or above.  Keep " these notes in mind:  · Weight includes both fat and muscle, so someone with a muscular build, such as an athlete, may have a BMI that is higher than 24.9. In cases like these, BMI is not an accurate measure of body fat.  · To determine if excess body fat is the cause of a BMI of 25 or higher, further assessments may need to be done by a health care provider.  · BMI is usually interpreted in the same way for men and women.  Where to find more information  For more information about BMI, including tools to quickly calculate your BMI, go to these websites:  · Centers for Disease Control and Prevention: www.cdc.gov  · American Heart Association: www.heart.org  · National Heart, Lung, and Blood [place]: www.nhlbi.nih.gov  Summary  · Body mass index (BMI) is a number that is calculated from a person's weight and height.  · BMI may help estimate how much of a person's weight is composed of fat. BMI can help identify those who may be at higher risk for certain medical problems.  · BMI can be measured using English measurements or metric measurements.  · BMI charts are used to identify whether you are underweight, normal weight, overweight, or obese.  This information is not intended to replace advice given to you by your health care provider. Make sure you discuss any questions you have with your health care provider.  Document Revised: 09/09/2020 Document Reviewed: 07/17/2020  [employer] Patient Education © 2021 [employer]

## 2022-04-19 ENCOUNTER — TELEPHONE (OUTPATIENT)
Dept: CARDIOLOGY | Facility: CLINIC | Age: 52
End: 2022-04-19

## 2022-04-19 ENCOUNTER — OFFICE VISIT (OUTPATIENT)
Dept: CARDIOLOGY | Facility: CLINIC | Age: 52
End: 2022-04-19

## 2022-04-19 VITALS
HEIGHT: 67 IN | HEART RATE: 88 BPM | DIASTOLIC BLOOD PRESSURE: 88 MMHG | WEIGHT: 183 LBS | BODY MASS INDEX: 28.72 KG/M2 | SYSTOLIC BLOOD PRESSURE: 142 MMHG

## 2022-04-19 DIAGNOSIS — I25.84 CORONARY ATHEROSCLEROSIS DUE TO CALCIFIED CORONARY LESION: Primary | ICD-10-CM

## 2022-04-19 DIAGNOSIS — E10.59 TYPE 1 DIABETES MELLITUS WITH OTHER CIRCULATORY COMPLICATION: ICD-10-CM

## 2022-04-19 DIAGNOSIS — N18.6 END-STAGE RENAL DISEASE: ICD-10-CM

## 2022-04-19 DIAGNOSIS — I25.708 CORONARY ARTERY DISEASE OF BYPASS GRAFT OF NATIVE HEART WITH STABLE ANGINA PECTORIS: Chronic | ICD-10-CM

## 2022-04-19 DIAGNOSIS — I25.10 CORONARY ATHEROSCLEROSIS DUE TO CALCIFIED CORONARY LESION: Primary | ICD-10-CM

## 2022-04-19 DIAGNOSIS — I10 BENIGN ESSENTIAL HYPERTENSION: ICD-10-CM

## 2022-04-19 PROCEDURE — 99214 OFFICE O/P EST MOD 30 MIN: CPT | Performed by: INTERNAL MEDICINE

## 2022-04-19 RX ORDER — DIPHENOXYLATE HYDROCHLORIDE AND ATROPINE SULFATE 2.5; .025 MG/1; MG/1
1 TABLET ORAL DAILY
COMMUNITY

## 2022-04-19 NOTE — PROGRESS NOTES
Subjective:     Encounter Date:04/19/22      Patient ID: Min Landon is a 51 y.o. male.    Chief Complaint:  History of Present Illness    Dear Manfred SOLORZANO,    I had the pleasure of seeing this patient in the office today for initial evaluation and consultation.  I appreciate that you sent him in to see us.  They come in today to be seen for coronary artery disease and angina pectoris.    Patient is a pleasant male; this is his first visit to see us.  I understand that you performed a cardiac catheterization earlier this year in January after a non-ST segment elevation myocardial infarction that occurred in the setting of acute onset of atrial fibrillation during hemodialysis.  Patient informs me that he had an attempted revascularization it was unsuccessful due to chronic total occlusion he has been treated with medical therapy.  He was referred here for further evaluation.    We have called to get records, do not yet have those.    Patient reports that he has some angina pectoris intermittently at times.  More so he gets dyspneic with activity.  Says this kind of variable, some days he is able to do okay another days he gets short of breath or has chest discomfort with any type of activity.    He has end-stage renal disease and is on dialysis on Monday Wednesday Friday.  He has been having issues with low blood pressure on dialysis days and because of this he is only taking 12.5 mg of metoprolol succinate daily.  Despite that, he states that on dialysis days sometimes they have to keep him for a while because his blood pressure is too low.  He has been having some dizziness when he has the low blood pressure.  He does not really note any tachycardia he does have some occasional palpitations.  He states his energy level is low.    The following portions of the patient's history were reviewed and updated as appropriate: allergies, current medications, past family history, past medical history, past  "social history, past surgical history and problem list.    Procedures       Objective:     Vitals:    04/19/22 0826   BP: 142/88   Pulse: 88   Weight: 83 kg (183 lb)   Height: 170.2 cm (67.01\")     Body mass index is 28.65 kg/m².      Constitutional:       General: Not in acute distress.     Appearance: Well-developed. Not diaphoretic.   Eyes:      General:         Right eye: No discharge.         Left eye: No discharge.      Conjunctiva/sclera: Conjunctivae normal.      Pupils: Pupils are equal, round, and reactive to light.   HENT:      Head: Normocephalic and atraumatic.      Nose: Nose normal.   Neck:      Thyroid: No thyromegaly.      Trachea: No tracheal deviation.      Lymphadenopathy: No cervical adenopathy.   Pulmonary:      Effort: Pulmonary effort is normal. No respiratory distress.      Breath sounds: Normal breath sounds. No stridor.   Chest:      Chest wall: Not tender to palpatation.   Cardiovascular:      Normal rate. Irregularly irregular rhythm.      . No S3 gallop.   Abdominal:      General: Bowel sounds are normal. There is no distension.      Palpations: Abdomen is soft. There is no abdominal mass.      Tenderness: There is no abdominal tenderness. There is no guarding or rebound.   Musculoskeletal: Normal range of motion.         General: No tenderness or deformity.      Cervical back: Normal range of motion and neck supple. Skin:     General: Skin is warm and dry.      Findings: No erythema or rash.   Neurological:      Mental Status: Alert and oriented to person, place, and time.      Deep Tendon Reflexes: Reflexes are normal and symmetric.   Psychiatric:         Thought Content: Thought content normal.         Data and records reviewed:     Lab Results   Component Value Date    BUN 55 (H) 07/06/2021    CREATININE 3.68 (H) 07/06/2021    EGFRIFNONA 18 (L) 07/06/2021    EGFRIFAFRI 21 (L) 07/06/2021    BCR 15 07/06/2021    K 4.2 07/06/2021    CO2 25 07/06/2021    CALCIUM 8.7 (L) 07/06/2021    " ALBUMIN 3.6 07/06/2021    AST 19 07/06/2021    ALT 13 07/06/2021     No results found for: CHOL  Lab Results   Component Value Date    TRIG 140 07/06/2021     Lab Results   Component Value Date    HDL 34 (L) 07/06/2021     Lab Results   Component Value Date    LDL 26 07/06/2021     No results found for: VLDL  No results found for: LDLHDL  CBC    CBC 7/6/21   WBC 6.26   RBC 3.40 (A)   Hemoglobin 9.0 (A)   Hematocrit 29.1 (A)   MCV 86   MCH 26.5   MCHC 30.9   RDW 12.2   Platelets 190   (A) Abnormal value            No radiology results for the last 90 days.          Assessment:          Diagnosis Plan   1. Coronary atherosclerosis due to calcified coronary lesion  metoprolol tartrate (LOPRESSOR) 25 MG tablet   2. End-stage renal disease (HCC)     3. 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 Coronary artery disease of bypass graft of native heart with stable angina pectoris (HCC)     5. Benign essential hypertension            Plan:       1.  Coronary artery disease with angina pectoris- heart rate was 88 after even sudden and we talked.  I would like to get his heart rate lower, and then switch his metoprolol succinate over to metoprolol tartrate 25 mg twice daily; he is instructed not to take it the mornings where he is coming in for his dialysis.  I have asked him to call me in 1-2 weeks let me know how he is feeling with this and has blood pressure is doing on dialysis.  2.  Report of chronic total occlusion-we have called to get records, we will also call and get the disc, and then I can review that with our interventional cardiology team  3.  Diabetes mellitus with circulatory complication, continue to focus on risk factor modification  4.  End-stage renal disease on dialysis, Monday Wednesday Friday as outlined above, post transplant with allograft failure  5.  Persistent atrial fibrillation on rate control, not on anticoagulation at this point with AV fistula  maturation.    Addendum: Records received.  Patient had an echocardiogram performed while hospitalized.  He was admitted on January 17 and discharged and December 18.  Echocardiogram shows mildly reduced left systolic function, ejection 45-50%, mild LVH, mild left atrial enlargement, he had a cardiac catheterization performed on the 18th that was reported to show a normal left main, LAD with a proximal 70% stenosis with an FFR of 0.85, scattered 30 to 40% stenosis throughout the LAD system, first diagonal has an ostial 80% stenosis with a mid vessel 100% occlusion with faint collateral filling.  Left circumflex gave rise to a moderate-sized first obtuse marginal branch, the distal vessel has a 50% stenosis.  The first obtuse marginal branch contains a distal occlusion, the right coronary artery has 20 to 40% stenosis throughout.  There was an attempt at revascularization of the first diagonal as well as the first obtuse marginal and neither were successful it stated in your office note the patient was referred here for consideration of .    Is also noted that because of new onset atrial fibrillation he met criteria for anticoagulation but it was decided to hold on anticoagulation his until AV fistula had fully matured  Thank you very much for allowing us to participate in the care of this pleasant patient.  Please don't hesitate to call if I can be of assistance in any way.      Current Outpatient Medications:   •  apixaban (ELIQUIS) 5 MG tablet tablet, Take 1 tablet by mouth 2 (Two) Times a Day., Disp: 60 tablet, Rfl: 11  •  clopidogrel (PLAVIX) 75 MG tablet, Take 1 tablet by mouth Daily., Disp: 30 tablet, Rfl: 11  •  fenofibrate 160 MG tablet, Take 160 mg by mouth Daily., Disp: , Rfl:   •  isosorbide mononitrate (IMDUR) 30 MG 24 hr tablet, Take 1 tablet by mouth Daily., Disp: 30 tablet, Rfl: 11  •  multivitamin (MULTI-VITAMIN PO), Take 1 tablet by mouth Daily., Disp: , Rfl:   •  NOVOLOG FLEXPEN 100 UNIT/ML  solution pen-injector sc pen, 10 Units. And sliding scale, Disp: , Rfl:   •  oxyCODONE-acetaminophen (PERCOCET) 5-325 MG per tablet, As Needed., Disp: , Rfl:   •  pantoprazole (PROTONIX) 40 MG EC tablet, Take 40 mg by mouth Daily., Disp: , Rfl:   •  predniSONE (DELTASONE) 5 MG tablet, Take 5 mg by mouth Every Other Day., Disp: , Rfl:   •  Renvela 800 MG tablet, Take 800 mg by mouth 3 (Three) Times a Day With Meals., Disp: , Rfl:   •  rosuvastatin (CRESTOR) 20 MG tablet, Take 1 tablet by mouth Daily., Disp: 90 tablet, Rfl: 3  •  tacrolimus (PROGRAF) 1 MG capsule, Take 0.5 mg by mouth 2 (Two) Times a Day., Disp: , Rfl:   •  TRESIBA FLEXTOUCH 200 UNIT/ML solution pen-injector pen injection, 50 Units Daily., Disp: , Rfl:   •  metoprolol tartrate (LOPRESSOR) 25 MG tablet, Take 1 tablet by mouth 2 (Two) Times a Day. DO NOT TAKE MORNING OF DIALYSIS, Disp: 60 tablet, Rfl: 5         No follow-ups on file.

## 2022-04-19 NOTE — TELEPHONE ENCOUNTER
Can you please request the cardiac cath disc that was done at [facility] in January 2022? PH#: [phone]

## 2022-05-03 ENCOUNTER — TELEPHONE (OUTPATIENT)
Dept: CARDIOLOGY | Facility: CLINIC | Age: 52
End: 2022-05-03

## 2022-05-03 ENCOUNTER — OFFICE VISIT (OUTPATIENT)
Dept: CARDIOLOGY | Facility: CLINIC | Age: 52
End: 2022-05-03

## 2022-05-03 VITALS
OXYGEN SATURATION: 98 % | DIASTOLIC BLOOD PRESSURE: 88 MMHG | SYSTOLIC BLOOD PRESSURE: 170 MMHG | HEART RATE: 70 BPM | HEIGHT: 67 IN | BODY MASS INDEX: 28.38 KG/M2 | WEIGHT: 180.8 LBS

## 2022-05-03 DIAGNOSIS — I25.84 CORONARY ATHEROSCLEROSIS DUE TO CALCIFIED CORONARY LESION: Primary | ICD-10-CM

## 2022-05-03 DIAGNOSIS — I25.10 CORONARY ATHEROSCLEROSIS DUE TO CALCIFIED CORONARY LESION: Primary | ICD-10-CM

## 2022-05-03 DIAGNOSIS — I10 BENIGN ESSENTIAL HYPERTENSION: ICD-10-CM

## 2022-05-03 DIAGNOSIS — I10 ESSENTIAL HYPERTENSION: ICD-10-CM

## 2022-05-03 DIAGNOSIS — N18.6 END-STAGE RENAL DISEASE: ICD-10-CM

## 2022-05-03 DIAGNOSIS — I48.0 PAROXYSMAL ATRIAL FIBRILLATION: ICD-10-CM

## 2022-05-03 PROCEDURE — 99214 OFFICE O/P EST MOD 30 MIN: CPT | Performed by: NURSE PRACTITIONER

## 2022-05-03 RX ORDER — FUROSEMIDE 20 MG/1
20 TABLET ORAL DAILY
Qty: 14 TABLET | Refills: 0 | Status: SHIPPED | OUTPATIENT
Start: 2022-05-03 | End: 2022-08-11 | Stop reason: SDUPTHER

## 2022-05-03 NOTE — PROGRESS NOTES
Subjective     Min Landon is a 51 y.o. male who presents to day for Follow up from seeing Dr Colon in [place] and [facility] follow up .    CHIEF COMPLIANT  Chief Complaint   Patient presents with   • Follow up from seeing Dr Colon in [place]   • [facility] follow up        Active Problems:  Problem List Items Addressed This Visit        Cardiac and Vasculature    Atherosclerosis of coronary artery - Primary    Relevant Medications    furosemide (LASIX) 20 MG tablet    Other Relevant Orders    XR Chest PA & Lateral    Benign essential hypertension    Overview     Formatting of this note might be different from the original.    Essential (primary) hypertension           Relevant Medications    furosemide (LASIX) 20 MG tablet    Other Relevant Orders    XR Chest PA & Lateral       Genitourinary and Reproductive     End-stage renal disease (HCC)    Overview     Formatting of this note might be different from the original.    End stage renal disease           Relevant Medications    furosemide (LASIX) 20 MG tablet    Other Relevant Orders    XR Chest PA & Lateral      Other Visit Diagnoses     Paroxysmal atrial fibrillation (HCC)        Relevant Medications    furosemide (LASIX) 20 MG tablet    Other Relevant Orders    XR Chest PA & Lateral    Essential hypertension        Relevant Medications    furosemide (LASIX) 20 MG tablet    Other Relevant Orders    XR Chest PA & Lateral      Problem list:     1.  Shortness of breath  1.1 stress test 11/21: Fixed moderate size perfusion defect lateral myocardium from the apex to the base involving portions of the anterior and inferior myocardium tiny component of reversibility at the apex of the myocardium.  Decreased wall motion at the apex and septum decreased myocardial wall thickening at the apex and mid lateral wall, EF slightly low at 48%  1.2 [facility], 1- per Dr. Saul. Unsuccessful attempt of  and revascularization of First Diag. Of the  LAD and First OM of Left Circ.,  distal Left Circ. 50%, PLAD 70%. Medical management.  1.3 patient evaluated by  clinic in [place] and was found not to be a candidate for revascularization of his .  1.2 echocardiogram 1/22: EF 45 to 50% lateral hypokinesis  1.3 echocardiogram 4/22: EF 45 to 50%, dilated left atrium, trivial PI, trivial TR peak PA pressures of 48 mmHg, left pleural effusion, normal diastolic function  2.  Palpitations  3.  Diabetes mellitus  4.  Hyperlipidemia  5.  Hypertension  6.  Chronic kidney disease on dialysis  7. Atrial fibrillation [id number] score of 4, has bled score of 2    HPI  HPI  Mr. Landon is a 51-year-old male patient who is being followed up today for recent hospital evaluation at [facility].  Patient was evaluated at [facility] for significant rapid increase in shortness of breath.  Patient did go an extensive laboratory work-up as well as radiological work-up.  The CT and x-ray showed pulmonary edema with loculated appearing effusion in which she went under a thoracentesis and 2-1/2 L were removed.  During this time he also had a troponin that went from 72 5 14-5 42.  He is in end-stage renal failure with failed transplant with a BUN of 33 and a creatinine of 4.15 in which he is on hemodialysis 3 times a week.  His BNP was elevated to 4155.  He also had a white count originally at 19.55 and reduced to 8.22.  He also had a normocytic anemia with a hemoglobin of 10.7.  Patient also has a history of atrial fibrillation which she does have intermittent palpitations on a rare basis.  He is only had a very few episodes of severe palpitations.  He is currently on metoprolol 25 mg twice daily and anticoagulated with Eliquis 5 mg twice daily.  He denies any signs or symptoms of bleeding.  He also has a history of reduced ejection fraction with an EF of 45 to 50%.  He is on metoprolol but is not on ACE inhibitor due to severe renal impairment.  He also has coronary artery disease in which he is on isosorbide for  antianginal therapy as well as Plavix for antiplatelet therapy.  He also has chronic arterial hypertension which is blood pressure significantly elevated today at 170/88 heart rate is 70.  He is being treated for his chronic arterial hypertension with metoprolol.  He reports that he has not taken his blood pressure medications this morning.  He does report feeling a significant amount better since he has been discharged from the hospital.  He denies any chest pain, shortness of breath, lower extreme edema, fatigue, dizziness, lightheadedness, or strokelike symptoms.                PRIOR MEDS  Current Outpatient Medications on File Prior to Visit   Medication Sig Dispense Refill   • apixaban (ELIQUIS) 5 MG tablet tablet Take 1 tablet by mouth 2 (Two) Times a Day. 60 tablet 11   • clopidogrel (PLAVIX) 75 MG tablet Take 1 tablet by mouth Daily. 30 tablet 11   • fenofibrate 160 MG tablet Take 160 mg by mouth Daily.     • isosorbide mononitrate (IMDUR) 30 MG 24 hr tablet Take 1 tablet by mouth Daily. 30 tablet 11   • metoprolol tartrate (LOPRESSOR) 25 MG tablet Take 1 tablet by mouth 2 (Two) Times a Day. DO NOT TAKE MORNING OF DIALYSIS 60 tablet 5   • multivitamin (THERAGRAN) tablet tablet Take 1 tablet by mouth Daily.     • NOVOLOG FLEXPEN 100 UNIT/ML solution pen-injector sc pen 10 Units. And sliding scale     • oxyCODONE-acetaminophen (PERCOCET) 5-325 MG per tablet As Needed.     • pantoprazole (PROTONIX) 40 MG EC tablet Take 40 mg by mouth Daily.     • predniSONE (DELTASONE) 5 MG tablet Take 5 mg by mouth Every Other Day.     • Renvela 800 MG tablet Take 800 mg by mouth 3 (Three) Times a Day. 2 tabs po w/ every meal     • rosuvastatin (CRESTOR) 20 MG tablet Take 1 tablet by mouth Daily. 90 tablet 3   • tacrolimus (PROGRAF) 1 MG capsule Take 0.5 mg by mouth 2 (Two) Times a Day.     • TRESIBA FLEXTOUCH 200 UNIT/ML solution pen-injector pen injection 50 Units Daily.       No current facility-administered medications on  "file prior to visit.       ALLERGIES  Bactrim [sulfamethoxazole-trimethoprim]    HISTORY  Past Medical History:   Diagnosis Date   • Calciphylaxis    • Chronic kidney disease    • Coronary artery disease of bypass graft of native heart with stable angina pectoris (HCC) 4/19/2022   • Diabetes mellitus (HCC)    • End stage renal disease (HCC)    • Hyperlipidemia    • Hypertension        Social History     Socioeconomic History   • Marital status:    Tobacco Use   • Smoking status: Never Smoker   • Smokeless tobacco: Former User   Substance and Sexual Activity   • Alcohol use: Never   • Drug use: Never       Family History   Problem Relation Age of Onset   • Sudden death Mother    • Hypertension Father    • Sudden death Father    • Heart disease Father         UNKNOWN   • Heart attack Father         UNKNOWN       Review of Systems   Constitutional: Negative for chills, fatigue and fever.   HENT: Negative for congestion, rhinorrhea and sore throat.    Respiratory: Negative for chest tightness and shortness of breath.    Cardiovascular: Positive for palpitations. Negative for chest pain and leg swelling.   Gastrointestinal: Positive for diarrhea. Negative for constipation and nausea.   Musculoskeletal: Positive for arthralgias, back pain and neck pain.   Allergic/Immunologic: Positive for environmental allergies. Negative for food allergies.   Neurological: Positive for weakness. Negative for dizziness, syncope and light-headedness.   Hematological: Bruises/bleeds easily.   Psychiatric/Behavioral: Positive for sleep disturbance (can't go to sleep can't stay asleep).       Objective     VITALS: /88 (BP Location: Left arm, Patient Position: Sitting)   Pulse 70   Ht 170.2 cm (67\")   Wt 82 kg (180 lb 12.8 oz)   SpO2 98%   BMI 28.32 kg/m²     LABS:   Lab Results (most recent)     None          IMAGING:   No Images in the past 120 days found..    EXAM:  Physical Exam  Vitals and nursing note reviewed. "   Constitutional:       Appearance: He is well-developed.   HENT:      Head: Normocephalic and atraumatic.   Eyes:      Pupils: Pupils are equal, round, and reactive to light.   Neck:      Vascular: No carotid bruit or JVD.   Cardiovascular:      Rate and Rhythm: Normal rate and regular rhythm.      Pulses:           Carotid pulses are 2+ on the right side and 2+ on the left side.       Radial pulses are 2+ on the right side and 2+ on the left side.        Posterior tibial pulses are 2+ on the right side and 2+ on the left side.      Heart sounds: Murmur heard.     No gallop.   Pulmonary:      Effort: Pulmonary effort is normal. No respiratory distress.      Breath sounds: Examination of the left-lower field reveals decreased breath sounds. Decreased breath sounds present.   Abdominal:      General: Bowel sounds are normal. There is no distension.      Palpations: Abdomen is soft.      Tenderness: There is no abdominal tenderness.   Musculoskeletal:         General: No swelling. Normal range of motion.      Cervical back: Neck supple.   Skin:     General: Skin is warm and dry.   Neurological:      Mental Status: He is alert and oriented to person, place, and time.      Cranial Nerves: No cranial nerve deficit.      Sensory: No sensory deficit.   Psychiatric:         Speech: Speech normal.         Behavior: Behavior normal.         Thought Content: Thought content normal.         Judgment: Judgment normal.         Procedure   Procedures       Assessment/Plan    Diagnosis Plan   1. Coronary atherosclerosis due to calcified coronary lesion  furosemide (LASIX) 20 MG tablet    XR Chest PA & Lateral   2. End-stage renal disease (HCC)  furosemide (LASIX) 20 MG tablet    XR Chest PA & Lateral   3. Benign essential hypertension  furosemide (LASIX) 20 MG tablet    XR Chest PA & Lateral   4. Paroxysmal atrial fibrillation (HCC)  furosemide (LASIX) 20 MG tablet    XR Chest PA & Lateral   5. Essential hypertension  furosemide  (LASIX) 20 MG tablet    XR Chest PA & Lateral   1.  Patient does have significant coronary artery disease however he currently denies any angina anginal equivalent symptoms.  He did go under recent echocardiogram which showed that his ejection fraction has maintained 45 to 50%.    2.  Due to patient's fluid overload in which she had 2-1/2 L withdrew from a pleurocentesis I do think it is appropriate to put him on Lasix 20 mg daily to see if we can help reduce the recurrence of the pleural effusions.  He was advised to discuss diuretic therapy with his nephrologist.  3.  I also like to get a chest x-ray to reevaluate the effusions to make sure they are not reoccurring.  4.  Patient does have significant chronic arterial hypertension where his blood pressure is elevated today 170/88.  He has not taken his medications this morning.  He will monitor his blood pressure on a routine basis report any significant highs or lows.  5.  Informed of signs and symptoms of ACS and advised to seek emergent treatment for any new worsening symptoms.  Patient also advised sooner follow-up as needed.  Also advised to follow-up with family doctor as needed  This note is dictated utilizing voice recognition software.  Although this record has been proof read, transcriptional errors may still be present. If questions occur regarding the content of this record please do not hesitate to call our office.  I have reviewed and confirmed the accuracy of the ROS as documented by the MA/LPN/RN RASHAD Garza    Return in about 3 months (around 8/3/2022), or if symptoms worsen or fail to improve.    Diagnoses and all orders for this visit:    1. Coronary atherosclerosis due to calcified coronary lesion (Primary)  -     furosemide (LASIX) 20 MG tablet; Take 1 tablet by mouth Daily.  Dispense: 14 tablet; Refill: 0  -     XR Chest PA & Lateral; Future    2. End-stage renal disease (HCC)  -     furosemide (LASIX) 20 MG tablet; Take 1 tablet by  mouth Daily.  Dispense: 14 tablet; Refill: 0  -     XR Chest PA & Lateral; Future    3. Benign essential hypertension  -     furosemide (LASIX) 20 MG tablet; Take 1 tablet by mouth Daily.  Dispense: 14 tablet; Refill: 0  -     XR Chest PA & Lateral; Future    4. Paroxysmal atrial fibrillation (HCC)  -     furosemide (LASIX) 20 MG tablet; Take 1 tablet by mouth Daily.  Dispense: 14 tablet; Refill: 0  -     XR Chest PA & Lateral; Future    5. Essential hypertension  -     furosemide (LASIX) 20 MG tablet; Take 1 tablet by mouth Daily.  Dispense: 14 tablet; Refill: 0  -     XR Chest PA & Lateral; Future        Min Landon  reports that he has never smoked. He has quit using smokeless tobacco.. I have educated him on the risk of diseases from using tobacco products .       MEDS ORDERED DURING VISIT:  New Medications Ordered This Visit   Medications   • furosemide (LASIX) 20 MG tablet     Sig: Take 1 tablet by mouth Daily.     Dispense:  14 tablet     Refill:  0           This document has been electronically signed by Manfred Ascencio Jr., APRN  May 9, 2022 00:16 EDT

## 2022-05-03 NOTE — TELEPHONE ENCOUNTER
From: Wliliam Colon III, MD   Sent: 5/2/2022   9:34 AM EDT   To: RASHAD Garza     I wanted to follow-up with you on this patient, the interventional team reviewed his films after we got the disc and at this point they would favor continued optimization of his medical therapy, feel that he is a suboptimal candidate for  intervention. Thanks for sending him up for us to see, if I can help in any way please let me know .    Will contact the patient with these results.    Josey NAGEL

## 2022-05-05 ENCOUNTER — TELEPHONE (OUTPATIENT)
Dept: CARDIOLOGY | Facility: CLINIC | Age: 52
End: 2022-05-05

## 2022-05-05 NOTE — TELEPHONE ENCOUNTER
Manfred Ascencio APRN [place], [place], [place]  Will you please inform patient that they did not feel as if they could revascularize the chronic total occlusion and medical management was recommended by the interventional team in [place] .    Called and notified patient of above note.        Josey NAGEL

## 2022-07-13 ENCOUNTER — TELEPHONE (OUTPATIENT)
Dept: CARDIOLOGY | Facility: CLINIC | Age: 52
End: 2022-07-13

## 2022-07-13 NOTE — TELEPHONE ENCOUNTER
"----- Message from RASHAD Garza sent at 7/13/2022  8:32 AM EDT -----    ----- Message -----  From: Lexii Vasquez MA  Sent: 7/8/2022  12:29 PM EDT  To: RASHAD Garza      ----- Message -----  From: Elvie Rodney  Sent: 7/8/2022  12:18 PM EDT  To: Lexii Vasquez MA    Glad you caught that, 107/60-something. I apologize.   ----- Message -----  From: Lexii Vasquez MA  Sent: 7/8/2022  12:10 PM EDT  To: Elvie Rodney    Wanting to clarify, was his /60 this morning    ----- Message -----  From: Elvie Rodney  Sent: 7/8/2022  11:24 AM EDT  To: Lexii Vasquez MA, RASHAD Garza    Pt called FO line stating that he's having issues w/ his BP \"bottoming out\" and needs a sooner appt w/ JR.     Per chart review, pt has an appt in August and I don't see any sooner openings.         Triage was unavailable, so I took collected info from pt:     I advised pt that I could see if triage was available or I could send a message back for review.   Pt stated that he's had issues w/ low BP x 2 weeks or more. Stated that he stopped his Metoprolol Tart x 1 week ago, stated that did not help. He has not resumed that medication. Pt denies changing any other medications. Pt denies sx of illness, does state he's been having issues w/ dizziness x 2 weeks. He had to have 02 put on him at dialysis today as a result, pt is unsure of what his oxygen level was.   Pt states BP this am was 170/60-something HR 81. Another reading was 96/63   after working for a while. Stated BP doesn't seem to fluctuate based on time of day, stays low all day.   I asked if he's staying hydrated , he stated he tried, drinks about 3 bottles of water/day. States he had labs recently w/ DM  and everything was normal.   Made pt aware that our office does close early today and he may not receive response until Monday. Advised him to go to the ER for any worsening sx, he verbalized understanding.     Best callback # for pt: " [phone]  Px: Saurabh Logan

## 2022-07-13 NOTE — TELEPHONE ENCOUNTER
Per  have patient hold Isosorbide and see if blood pressure improves. Called patient and notified him of holding Isosorbide. He will check bp twice daily for 4-5 days and let us know of his readings.    Josey NAGEL

## 2022-08-11 ENCOUNTER — OFFICE VISIT (OUTPATIENT)
Dept: CARDIOLOGY | Facility: CLINIC | Age: 52
End: 2022-08-11

## 2022-08-11 VITALS
DIASTOLIC BLOOD PRESSURE: 91 MMHG | HEIGHT: 67 IN | SYSTOLIC BLOOD PRESSURE: 158 MMHG | WEIGHT: 184.4 LBS | OXYGEN SATURATION: 97 % | BODY MASS INDEX: 28.94 KG/M2 | HEART RATE: 70 BPM

## 2022-08-11 DIAGNOSIS — I25.10 CORONARY ATHEROSCLEROSIS DUE TO CALCIFIED CORONARY LESION: Primary | ICD-10-CM

## 2022-08-11 DIAGNOSIS — I25.84 CORONARY ATHEROSCLEROSIS DUE TO CALCIFIED CORONARY LESION: Primary | ICD-10-CM

## 2022-08-11 DIAGNOSIS — I95.1 AUTONOMIC ORTHOSTATIC HYPOTENSION: ICD-10-CM

## 2022-08-11 DIAGNOSIS — I48.0 PAROXYSMAL ATRIAL FIBRILLATION: ICD-10-CM

## 2022-08-11 DIAGNOSIS — I10 BENIGN ESSENTIAL HYPERTENSION: ICD-10-CM

## 2022-08-11 PROCEDURE — 99214 OFFICE O/P EST MOD 30 MIN: CPT | Performed by: NURSE PRACTITIONER

## 2022-08-11 RX ORDER — MIDODRINE HYDROCHLORIDE 2.5 MG/1
2.5 TABLET ORAL
Qty: 90 TABLET | Refills: 3 | Status: SHIPPED | OUTPATIENT
Start: 2022-08-11 | End: 2022-12-05

## 2022-08-11 NOTE — PROGRESS NOTES
Subjective     Min Landon is a 51 y.o. male who presents to day for 3 month follow up  and Hypotension (When standing stopped Metoprolol and Isosorbide to no effect//Orthostatics).    CHIEF COMPLIANT  Chief Complaint   Patient presents with   • 3 month follow up    • Hypotension     When standing stopped Metoprolol and Isosorbide to no effect    Orthostatics       Active Problems:  Problem List Items Addressed This Visit        Cardiac and Vasculature    Atherosclerosis of coronary artery - Primary    Relevant Medications    midodrine (PROAMATINE) 2.5 MG tablet    Benign essential hypertension    Overview     Formatting of this note might be different from the original.    Essential (primary) hypertension         Relevant Medications    midodrine (PROAMATINE) 2.5 MG tablet      Other Visit Diagnoses     Autonomic orthostatic hypotension        Relevant Medications    midodrine (PROAMATINE) 2.5 MG tablet    Paroxysmal atrial fibrillation (HCC)        Relevant Medications    midodrine (PROAMATINE) 2.5 MG tablet      Problem list:     1.  Shortness of breath  1.1 stress test 11/21: Fixed moderate size perfusion defect lateral myocardium from the apex to the base involving portions of the anterior and inferior myocardium tiny component of reversibility at the apex of the myocardium.  Decreased wall motion at the apex and septum decreased myocardial wall thickening at the apex and mid lateral wall, EF slightly low at 48%  1.2 [facility], 1- per Dr. Saul. Unsuccessful attempt of  and revascularization of First Diag. Of the  LAD and First OM of Left Circ., distal Left Circ. 50%, PLAD 70%. Medical management.  1.3 patient evaluated by  clinic in [place] and was found not to be a candidate for revascularization of his .  1.2 echocardiogram 1/22: EF 45 to 50% lateral hypokinesis  1.3 echocardiogram 4/22: EF 45 to 50%, dilated left atrium, trivial PI, trivial TR peak PA pressures of 48 mmHg, left pleural  effusion, normal diastolic function  2.  Palpitations  3.  Diabetes mellitus  4.  Hyperlipidemia  5.  Hypertension  6.  Chronic kidney disease on dialysis  7. Atrial fibrillation [id number] score of 4, has bled score of 2    HPI  HPI  Mr. Landon is a 51-year-old male patient who is being followed up today for chronic arterial hypertension and coronary artery disease.  Patient does have a history of chronic arterial hypertension however he is experiencing significant orthostasis and has discontinued his isosorbide and metoprolol.  He says his blood pressure will get down into the 90s systolic and he becomes dizzy.  We did discuss multiple options but due to renal failure and dialysis patient increasing fluid and salt is not a good option.  He does wear compression socks religiously.  We did discuss the benefits and risk of using medication such as midodrine to help increase his blood pressure and prevent orthostasis.  He verbalized understanding.  He also has atrial fibrillation with a [id number] score of 4 and has bled of 2.  He is on Eliquis and Plavix currently and he denies any signs or symptoms of bleeding.  He does have chronic coronary artery disease in which she does have a  of his RCA which was not able to be revascularized.  He will continue the Plavix for at least a year and in addition to his Eliquis.  Patient's blood pressure was elevated today at 158/91 heart rate is 70.  He says when he sitting it usually in the 130s over 60s however when he stands it drops to 90 or less.  He denies any shortness of breath, chest pain, lower extremity edema, palpitations, syncope, PND, orthopnea, or GERD symptoms.  PRIOR MEDS  Current Outpatient Medications on File Prior to Visit   Medication Sig Dispense Refill   • apixaban (ELIQUIS) 5 MG tablet tablet Take 1 tablet by mouth 2 (Two) Times a Day. 60 tablet 11   • clopidogrel (PLAVIX) 75 MG tablet Take 1 tablet by mouth Daily. 30 tablet 11   • fenofibrate 160 MG  tablet Take 160 mg by mouth Daily.     • multivitamin (THERAGRAN) tablet tablet Take 1 tablet by mouth Daily.     • NOVOLOG FLEXPEN 100 UNIT/ML solution pen-injector sc pen 10 Units. And sliding scale     • oxyCODONE-acetaminophen (PERCOCET) 5-325 MG per tablet As Needed.     • pantoprazole (PROTONIX) 40 MG EC tablet Take 40 mg by mouth Daily.     • predniSONE (DELTASONE) 5 MG tablet Take 5 mg by mouth Every Other Day.     • Renvela 800 MG tablet Take 800 mg by mouth 3 (Three) Times a Day. 2 tabs po w/ every meal     • rosuvastatin (CRESTOR) 20 MG tablet Take 1 tablet by mouth Daily. 90 tablet 3   • tacrolimus (PROGRAF) 1 MG capsule Take 0.5 mg by mouth 2 (Two) Times a Day.     • TRESIBA FLEXTOUCH 200 UNIT/ML solution pen-injector pen injection 50 Units Daily.     • [DISCONTINUED] furosemide (LASIX) 20 MG tablet Take 1 tablet by mouth Daily. 14 tablet 0   • [DISCONTINUED] isosorbide mononitrate (IMDUR) 30 MG 24 hr tablet Take 1 tablet by mouth Daily. 30 tablet 11   • [DISCONTINUED] metoprolol tartrate (LOPRESSOR) 25 MG tablet Take 1 tablet by mouth 2 (Two) Times a Day. DO NOT TAKE MORNING OF DIALYSIS 60 tablet 5     No current facility-administered medications on file prior to visit.       ALLERGIES  Bactrim [sulfamethoxazole-trimethoprim]    HISTORY  Past Medical History:   Diagnosis Date   • Calciphylaxis    • Chronic kidney disease    • Coronary artery disease of bypass graft of native heart with stable angina pectoris (HCC) 04/19/2022   • Diabetes mellitus (HCC)    • End stage renal disease (HCC)    • Hyperlipidemia    • Hypertension    • Sleep apnea 08/09/2022    C PAP       Social History     Socioeconomic History   • Marital status:    Tobacco Use   • Smoking status: Never Smoker   • Smokeless tobacco: Former User   Substance and Sexual Activity   • Alcohol use: Never   • Drug use: Never       Family History   Problem Relation Age of Onset   • Sudden death Mother    • Hypertension Father    • Sudden  "death Father    • Heart disease Father         UNKNOWN   • Heart attack Father         UNKNOWN       Review of Systems   Constitutional: Positive for fever (low grade / has pocket of fluid under ribs L side   CT normal other than fluid). Negative for chills and fatigue.   HENT: Negative for congestion, rhinorrhea and sore throat.    Respiratory: Positive for apnea ( C PAP). Negative for chest tightness and shortness of breath.    Cardiovascular: Negative for chest pain, palpitations and leg swelling.   Gastrointestinal: Negative for constipation, diarrhea and nausea.   Musculoskeletal: Positive for arthralgias and back pain. Negative for neck pain.   Allergic/Immunologic: Positive for environmental allergies. Negative for food allergies.   Neurological: Positive for dizziness (when standing up ), weakness and light-headedness. Negative for syncope.   Hematological: Bruises/bleeds easily.   Psychiatric/Behavioral: Negative for sleep disturbance.       Objective     VITALS: /91 (BP Location: Left arm, Patient Position: Sitting)   Pulse 70   Ht 170.2 cm (67.01\")   Wt 83.6 kg (184 lb 6.4 oz)   SpO2 97%   BMI 28.87 kg/m²     LABS:   Lab Results (most recent)     None          IMAGING:   No Images in the past 120 days found..    EXAM:  Physical Exam  Vitals and nursing note reviewed.   Constitutional:       Appearance: He is well-developed.   HENT:      Head: Normocephalic.   Eyes:      Pupils: Pupils are equal, round, and reactive to light.   Neck:      Thyroid: No thyroid mass.      Vascular: No carotid bruit or JVD.      Trachea: Trachea and phonation normal.   Cardiovascular:      Rate and Rhythm: Normal rate and regular rhythm.      Pulses:           Radial pulses are 2+ on the right side and 2+ on the left side.        Posterior tibial pulses are 2+ on the right side and 2+ on the left side.      Heart sounds: Murmur heard.     No friction rub. No gallop.   Pulmonary:      Effort: Pulmonary effort is " normal. No respiratory distress.      Breath sounds: Normal breath sounds. No wheezing or rales.   Abdominal:      General: Bowel sounds are normal.      Palpations: Abdomen is soft.   Musculoskeletal:         General: No swelling. Normal range of motion.      Cervical back: Neck supple.   Skin:     General: Skin is warm and dry.      Capillary Refill: Capillary refill takes less than 2 seconds.      Findings: No rash.   Neurological:      Mental Status: He is alert and oriented to person, place, and time.   Psychiatric:         Speech: Speech normal.         Behavior: Behavior normal.         Thought Content: Thought content normal.         Judgment: Judgment normal.         Procedure   Procedures       Assessment & Plan    Diagnosis Plan   1. Coronary atherosclerosis due to calcified coronary lesion  midodrine (PROAMATINE) 2.5 MG tablet   2. Benign essential hypertension  midodrine (PROAMATINE) 2.5 MG tablet   3. Autonomic orthostatic hypotension  midodrine (PROAMATINE) 2.5 MG tablet   4. Paroxysmal atrial fibrillation (HCC)  midodrine (PROAMATINE) 2.5 MG tablet   1.  Patient does have chronic arterial hypertension which his blood pressure has been dropping and make him orthostatic.  We will try him on midodrine 2.5 mg 3 times daily to see if we can remedy the orthostasis.  If we can get the orthostasis under control we may be able to restart patient back on metoprolol for his mildly reduced ejection fraction.  2.  Patient's atrial fibrillation seems relatively well under control.  He did stop the metoprolol and still does not have a palpitations or significant failure-like symptoms.  We will continue to monitor.  3.  We will resume the antiplatelet therapy of Plavix in addition to his Eliquis as well as his statin therapy for his chronic coronary artery disease.  He denies any angina anginal equivalent symptoms at this time.  4.  Patient is going today to be evaluated for potential for renal transplant.  5.   Informed of signs and symptoms of ACS and advised to seek emergent treatment for any new worsening symptoms.  Patient also advised sooner follow-up as needed.  Also advised to follow-up with family doctor as needed  This note is dictated utilizing voice recognition software.  Although this record has been proof read, transcriptional errors may still be present. If questions occur regarding the content of this record please do not hesitate to call our office.  I have reviewed and confirmed the accuracy of the ROS as documented by the MA/LPN/RN RASHAD Garza    Return in about 4 months (around 12/11/2022), or if symptoms worsen or fail to improve.    Diagnoses and all orders for this visit:    1. Coronary atherosclerosis due to calcified coronary lesion (Primary)  -     midodrine (PROAMATINE) 2.5 MG tablet; Take 1 tablet by mouth 3 (Three) Times a Day Before Meals.  Dispense: 90 tablet; Refill: 3    2. Benign essential hypertension  -     midodrine (PROAMATINE) 2.5 MG tablet; Take 1 tablet by mouth 3 (Three) Times a Day Before Meals.  Dispense: 90 tablet; Refill: 3    3. Autonomic orthostatic hypotension  -     midodrine (PROAMATINE) 2.5 MG tablet; Take 1 tablet by mouth 3 (Three) Times a Day Before Meals.  Dispense: 90 tablet; Refill: 3    4. Paroxysmal atrial fibrillation (HCC)  -     midodrine (PROAMATINE) 2.5 MG tablet; Take 1 tablet by mouth 3 (Three) Times a Day Before Meals.  Dispense: 90 tablet; Refill: 3        Min Landon  reports that he has never smoked. He has quit using smokeless tobacco.           MEDS ORDERED DURING VISIT:  New Medications Ordered This Visit   Medications   • midodrine (PROAMATINE) 2.5 MG tablet     Sig: Take 1 tablet by mouth 3 (Three) Times a Day Before Meals.     Dispense:  90 tablet     Refill:  3           This document has been electronically signed by RASHAD Garza Jr.  August 11, 2022 09:10 EDT

## 2022-09-21 DIAGNOSIS — I10 ESSENTIAL HYPERTENSION: ICD-10-CM

## 2022-09-21 DIAGNOSIS — N18.6 END-STAGE RENAL DISEASE: ICD-10-CM

## 2022-09-21 DIAGNOSIS — I25.10 CORONARY ATHEROSCLEROSIS DUE TO CALCIFIED CORONARY LESION: Primary | ICD-10-CM

## 2022-09-21 DIAGNOSIS — R06.02 SHORTNESS OF BREATH: ICD-10-CM

## 2022-09-21 DIAGNOSIS — I25.84 CORONARY ATHEROSCLEROSIS DUE TO CALCIFIED CORONARY LESION: Primary | ICD-10-CM

## 2022-09-21 DIAGNOSIS — E78.2 MIXED HYPERLIPIDEMIA: ICD-10-CM

## 2022-11-03 DIAGNOSIS — R06.02 SOB (SHORTNESS OF BREATH): ICD-10-CM

## 2022-11-03 DIAGNOSIS — R94.39 ABNORMAL STRESS TEST: ICD-10-CM

## 2022-11-03 DIAGNOSIS — I10 ESSENTIAL HYPERTENSION: ICD-10-CM

## 2022-11-03 DIAGNOSIS — R00.2 PALPITATIONS: ICD-10-CM

## 2022-11-03 RX ORDER — ROSUVASTATIN CALCIUM 20 MG/1
20 TABLET, COATED ORAL DAILY
Qty: 90 TABLET | Refills: 3 | Status: SHIPPED | OUTPATIENT
Start: 2022-11-03

## 2022-11-11 ENCOUNTER — HOSPITAL ENCOUNTER (OUTPATIENT)
Dept: CARDIOLOGY | Facility: HOSPITAL | Age: 52
Discharge: HOME | End: 2022-11-11

## 2022-11-11 VITALS — WEIGHT: 184.3 LBS | BODY MASS INDEX: 28.93 KG/M2 | HEIGHT: 67 IN

## 2022-11-11 DIAGNOSIS — I25.84 CORONARY ATHEROSCLEROSIS DUE TO CALCIFIED CORONARY LESION: ICD-10-CM

## 2022-11-11 DIAGNOSIS — I10 ESSENTIAL HYPERTENSION: ICD-10-CM

## 2022-11-11 DIAGNOSIS — E78.2 MIXED HYPERLIPIDEMIA: ICD-10-CM

## 2022-11-11 DIAGNOSIS — R06.02 SHORTNESS OF BREATH: ICD-10-CM

## 2022-11-11 DIAGNOSIS — N18.6 END-STAGE RENAL DISEASE: ICD-10-CM

## 2022-11-11 DIAGNOSIS — I25.10 CORONARY ATHEROSCLEROSIS DUE TO CALCIFIED CORONARY LESION: ICD-10-CM

## 2022-11-11 PROCEDURE — 78452 HT MUSCLE IMAGE SPECT MULT: CPT

## 2022-11-11 PROCEDURE — 0 TECHNETIUM SESTAMIBI: Performed by: INTERNAL MEDICINE

## 2022-11-11 PROCEDURE — A9500 TC99M SESTAMIBI: HCPCS | Performed by: INTERNAL MEDICINE

## 2022-11-11 PROCEDURE — 93306 TTE W/DOPPLER COMPLETE: CPT | Performed by: INTERNAL MEDICINE

## 2022-11-11 PROCEDURE — 78452 HT MUSCLE IMAGE SPECT MULT: CPT | Performed by: INTERNAL MEDICINE

## 2022-11-11 PROCEDURE — 93018 CV STRESS TEST I&R ONLY: CPT | Performed by: INTERNAL MEDICINE

## 2022-11-11 PROCEDURE — 93017 CV STRESS TEST TRACING ONLY: CPT

## 2022-11-11 PROCEDURE — 25010000002 REGADENOSON 0.4 MG/5ML SOLUTION: Performed by: NURSE PRACTITIONER

## 2022-11-11 PROCEDURE — 93306 TTE W/DOPPLER COMPLETE: CPT

## 2022-11-11 RX ADMIN — TECHNETIUM TC 99M SESTAMIBI 1 DOSE: 1 INJECTION INTRAVENOUS at 08:14

## 2022-11-11 RX ADMIN — REGADENOSON 0.4 MG: 0.08 INJECTION, SOLUTION INTRAVENOUS at 09:49

## 2022-11-11 RX ADMIN — TECHNETIUM TC 99M SESTAMIBI 1 DOSE: 1 INJECTION INTRAVENOUS at 10:47

## 2022-11-12 LAB
BH CV REST NUCLEAR ISOTOPE DOSE: 10 MCI
BH CV STRESS COMMENTS STAGE 1: NORMAL
BH CV STRESS DOSE REGADENOSON STAGE 1: 0.4
BH CV STRESS DURATION MIN STAGE 1: 0
BH CV STRESS DURATION SEC STAGE 1: 10
BH CV STRESS NUCLEAR ISOTOPE DOSE: 30 MCI
BH CV STRESS PROTOCOL 1: NORMAL
BH CV STRESS RECOVERY BP: NORMAL MMHG
BH CV STRESS RECOVERY HR: 93 BPM
BH CV STRESS STAGE 1: 1
MAXIMAL PREDICTED HEART RATE: 169 BPM
PERCENT MAX PREDICTED HR: 56.8 %
STRESS BASELINE BP: NORMAL MMHG
STRESS BASELINE HR: 77 BPM
STRESS PERCENT HR: 67 %
STRESS POST PEAK BP: NORMAL MMHG
STRESS POST PEAK HR: 96 BPM
STRESS TARGET HR: 144 BPM

## 2022-11-14 ENCOUNTER — TELEPHONE (OUTPATIENT)
Dept: CARDIOLOGY | Facility: CLINIC | Age: 52
End: 2022-11-14

## 2022-11-14 NOTE — TELEPHONE ENCOUNTER
----- Message from RASHAD Garza sent at 11/14/2022 11:35 AM EST -----  Positive stress follow-up ASAP    I called and notified patient of results. Ayla is looking to give patient ASAP apt.     Josey NAGEL

## 2022-12-01 ENCOUNTER — TELEPHONE (OUTPATIENT)
Dept: CARDIOLOGY | Facility: CLINIC | Age: 52
End: 2022-12-01

## 2022-12-01 NOTE — TELEPHONE ENCOUNTER
For the HUB to read to pt:       LEFT VOICEMAIL FOR PT TO CONFIRM APPT, IF PT CALLS BACK PLEASE PUT CALL THROUGH. THANK YOU

## 2022-12-03 DIAGNOSIS — R94.39 ABNORMAL STRESS TEST: ICD-10-CM

## 2022-12-03 DIAGNOSIS — R00.2 PALPITATIONS: ICD-10-CM

## 2022-12-03 DIAGNOSIS — R06.02 SOB (SHORTNESS OF BREATH): ICD-10-CM

## 2022-12-03 DIAGNOSIS — I10 ESSENTIAL HYPERTENSION: ICD-10-CM

## 2022-12-03 LAB
AORTIC DIMENSIONLESS INDEX: 0.71 (DI)
BH CV ECHO MEAS - ACS: 1.47 CM
BH CV ECHO MEAS - AO MAX PG: 9.3 MMHG
BH CV ECHO MEAS - AO MEAN PG: 4.8 MMHG
BH CV ECHO MEAS - AO ROOT DIAM: 2.7 CM
BH CV ECHO MEAS - AO V2 MAX: 152.6 CM/SEC
BH CV ECHO MEAS - AO V2 VTI: 33.9 CM
BH CV ECHO MEAS - EDV(CUBED): 198.7 ML
BH CV ECHO MEAS - EF_3D-VOL: 42 %
BH CV ECHO MEAS - ESV(CUBED): 64.8 ML
BH CV ECHO MEAS - FS: 31.2 %
BH CV ECHO MEAS - IVS/LVPW: 0.92 CM
BH CV ECHO MEAS - IVSD: 0.99 CM
BH CV ECHO MEAS - LA A2CS (ATRIAL LENGTH): 6.4 CM
BH CV ECHO MEAS - LA A4C LENGTH: 6.4 CM
BH CV ECHO MEAS - LA DIMENSION: 4.5 CM
BH CV ECHO MEAS - LAT PEAK E' VEL: 4.7 CM/SEC
BH CV ECHO MEAS - LV MASS(C)D: 246.9 GRAMS
BH CV ECHO MEAS - LV MAX PG: 4.9 MMHG
BH CV ECHO MEAS - LV MEAN PG: 2.34 MMHG
BH CV ECHO MEAS - LV V1 MAX: 110.6 CM/SEC
BH CV ECHO MEAS - LV V1 VTI: 26.6 CM
BH CV ECHO MEAS - LVIDD: 5.8 CM
BH CV ECHO MEAS - LVIDS: 4 CM
BH CV ECHO MEAS - LVPWD: 1.08 CM
BH CV ECHO MEAS - MED PEAK E' VEL: 5.9 CM/SEC
BH CV ECHO MEAS - MV A MAX VEL: 150.3 CM/SEC
BH CV ECHO MEAS - MV DEC SLOPE: 1093 CM/SEC2
BH CV ECHO MEAS - MV DEC TIME: 0.25 MSEC
BH CV ECHO MEAS - MV E MAX VEL: 180 CM/SEC
BH CV ECHO MEAS - MV E/A: 1.2
BH CV ECHO MEAS - MV MAX PG: 19.7 MMHG
BH CV ECHO MEAS - MV MEAN PG: 7 MMHG
BH CV ECHO MEAS - MV P1/2T: 57 MSEC
BH CV ECHO MEAS - MV V2 VTI: 57.6 CM
BH CV ECHO MEAS - MVA(P1/2T): 3.9 CM2
BH CV ECHO MEAS - PA V2 MAX: 106.3 CM/SEC
BH CV ECHO MEAS - PI END-D VEL: 115 CM/SEC
BH CV ECHO MEAS - RAP SYSTOLE: 10 MMHG
BH CV ECHO MEAS - RV MAX PG: 1.92 MMHG
BH CV ECHO MEAS - RV V1 MAX: 69.2 CM/SEC
BH CV ECHO MEAS - RV V1 VTI: 17 CM
BH CV ECHO MEAS - RVDD: 3.3 CM
BH CV ECHO MEAS - RVSP: 51.4 MMHG
BH CV ECHO MEAS - TAPSE (>1.6): 2.3 CM
BH CV ECHO MEAS - TR MAX PG: 41.4 MMHG
BH CV ECHO MEAS - TR MAX VEL: 321.7 CM/SEC
BH CV ECHO MEASUREMENTS AVERAGE E/E' RATIO: 33.96
BH CV XLRA - TDI S': 12.5 CM/SEC
LEFT ATRIUM VOLUME INDEX: 43.1 ML/M2
LEFT ATRIUM VOLUME: 84 ML
MAXIMAL PREDICTED HEART RATE: 169 BPM
SINUS: 2.6 CM
STRESS TARGET HR: 144 BPM

## 2022-12-05 ENCOUNTER — OFFICE VISIT (OUTPATIENT)
Dept: CARDIOLOGY | Facility: CLINIC | Age: 52
End: 2022-12-05

## 2022-12-05 VITALS
BODY MASS INDEX: 31.01 KG/M2 | DIASTOLIC BLOOD PRESSURE: 80 MMHG | HEART RATE: 86 BPM | OXYGEN SATURATION: 97 % | SYSTOLIC BLOOD PRESSURE: 143 MMHG | WEIGHT: 197.6 LBS | HEIGHT: 67 IN

## 2022-12-05 DIAGNOSIS — I25.84 CORONARY ATHEROSCLEROSIS DUE TO CALCIFIED CORONARY LESION: Primary | ICD-10-CM

## 2022-12-05 DIAGNOSIS — I50.33 ACUTE ON CHRONIC HEART FAILURE WITH PRESERVED EJECTION FRACTION (HFPEF): ICD-10-CM

## 2022-12-05 DIAGNOSIS — I10 ESSENTIAL HYPERTENSION: ICD-10-CM

## 2022-12-05 DIAGNOSIS — R94.39 ABNORMAL STRESS TEST: ICD-10-CM

## 2022-12-05 DIAGNOSIS — R06.02 SHORTNESS OF BREATH: ICD-10-CM

## 2022-12-05 DIAGNOSIS — I25.84 CORONARY ATHEROSCLEROSIS DUE TO CALCIFIED CORONARY LESION: ICD-10-CM

## 2022-12-05 DIAGNOSIS — I25.10 CORONARY ATHEROSCLEROSIS DUE TO CALCIFIED CORONARY LESION: ICD-10-CM

## 2022-12-05 DIAGNOSIS — I48.0 PAROXYSMAL ATRIAL FIBRILLATION: ICD-10-CM

## 2022-12-05 DIAGNOSIS — I25.10 CORONARY ATHEROSCLEROSIS DUE TO CALCIFIED CORONARY LESION: Primary | ICD-10-CM

## 2022-12-05 DIAGNOSIS — R94.39 ABNORMAL STRESS TEST: Primary | ICD-10-CM

## 2022-12-05 DIAGNOSIS — R06.02 SOB (SHORTNESS OF BREATH): ICD-10-CM

## 2022-12-05 DIAGNOSIS — N18.6 END-STAGE RENAL DISEASE: ICD-10-CM

## 2022-12-05 DIAGNOSIS — R07.2 PRECORDIAL PAIN: ICD-10-CM

## 2022-12-05 PROCEDURE — 99214 OFFICE O/P EST MOD 30 MIN: CPT | Performed by: NURSE PRACTITIONER

## 2022-12-05 RX ORDER — CLOPIDOGREL BISULFATE 75 MG/1
75 TABLET ORAL DAILY
Qty: 90 TABLET | Refills: 0 | Status: SHIPPED | OUTPATIENT
Start: 2022-12-05

## 2022-12-05 NOTE — PROGRESS NOTES
Subjective     Min Landon is a 52 y.o. male who presents to day for abnormal stress.    CHIEF COMPLIANT  Chief Complaint   Patient presents with   • abnormal stress       Active Problems:  Problem List Items Addressed This Visit        Other    Atherosclerosis of coronary artery    Relevant Medications    metoprolol tartrate (LOPRESSOR) 25 MG tablet    Other Relevant Orders    [facility]    End-stage renal disease (HCC)    Overview     Formatting of this note might be different from the original.    End stage renal disease         Relevant Orders    [facility]    Shortness of breath    Relevant Orders    [facility]   Other Visit Diagnoses     Abnormal stress test    -  Primary    Relevant Orders    [facility]    SOB (shortness of breath)        Relevant Orders    [facility]    Essential hypertension        Relevant Medications    metoprolol tartrate (LOPRESSOR) 25 MG tablet    Other Relevant Orders    [facility]    Acute on chronic heart failure with preserved ejection fraction (HFpEF) ([facility])        Relevant Medications    metoprolol tartrate (LOPRESSOR) 25 MG tablet    Other Relevant Orders    [facility]      Problem list:     1.  Shortness of breath  1.1 stress test 11/21: Fixed moderate size perfusion defect lateral myocardium from the apex to the base involving portions of the anterior and inferior myocardium tiny component of reversibility at the apex of the myocardium.  Decreased wall motion at the apex and septum decreased myocardial wall thickening at the apex and mid lateral wall, EF slightly low at 48%  1.2 [facility], 1- per Dr. Saul. Unsuccessful attempt of  and revascularization of First Diag. Of the  LAD and First OM of Left Circ., distal Left Circ. 50%, PLAD 70%. Medical management.  1.3 patient evaluated by  clinic in [place] and was found not to be a candidate for revascularization of his .  1.2  echocardiogram 1/22: EF 45 to 50% lateral hypokinesis  1.3 echocardiogram 4/22: EF 45 to 50%, dilated left atrium, trivial PI, trivial TR peak PA pressures of 48 mmHg, left pleural effusion, normal diastolic function  1.4 stress test 11/22:extensive anteroseptal, anterior, anterolateral, lateral, and inferolateral wall myocardial infarction with significant ольга-infarct ischemia in the more basilar portions of the defect as well as anteroseptal wall and to a lesser degree, the inferolateral wall. He had a severely depressed post-stress ejection fraction of 25 percent with wall motion abnormalities corresponding to the above.  2.  Palpitations  3.  Diabetes mellitus  4.  Hyperlipidemia  5.  Hypertension  6.  Chronic kidney disease on dialysis  7. Atrial fibrillation [id number] score of 4, has bled score of 2    HPI  HPI    Mr. Min Landon is a 52-year-old male who presents today for followup of an abnormal stress test. He is accompanied by by an adult female.    The patient's stress test identified extensive anteroseptal, anterior, anterolateral, lateral, and inferolateral wall myocardial infarction with significant ольга-infarct ischemia in the more basilar portions of the defect as well as anteroseptal wall and to a lesser degree, the inferolateral wall. He had a severely depressed post-stress ejection fraction of 25 percent with wall motion abnormalities corresponding to the above.    His most recent echocardiogram demonstrated a visually estimated ejection fraction of 40 to 45 percent. Pulmonary artery systolic pressures were estimated in the low 50s mmHg. The patient's previous ejection fraction per echocardiogram in 04/2022 was 45 to 50 percent, and peak pulmonary artery pressure was 48 mmHg.    The patient has a history of coronary artery disease. In 01/2022, he underwent cardiac catheterization with right femoral artery approach and unsuccessful attempt at chronic total occlusion and revascularization of  the first diagonal branch of the left anterior descending artery and distal occlusion of the first obtuse marginal branch of the left circumflex artery. There was noncritical distal left circumflex artery stenosis of approximately 50 percent and noncritical proximal left anterior descending artery stenosis of approximately 70 percent. FFR of the circumflex was adequate. He is taking antiplatelet therapy of Plavix and anticoagulation of Eliquis. He is taking high-intensity statin therapy of rosuvastatin. He notes that in the past, he was evaluated by Dr. Gresham in [place] for potential revascularization of his .    The patient has AFib in which he is rate controlled with metoprolol and anticoagulation of Eliquis.  He denies any signs or symptoms of bleeding.      He states that he discontinued midodrine after approximately 2 weeks due to elevated blood pressure, and at that time, he restarted metoprolol.    The patient reports that he underwent cardiac testing in an attempt to obtain cardiac clearance for renal transplant to be performed in [place], [place], which is currently on hold, awaiting further testing. He has end-stage renal disease and is on dialysis. The adult female believes the patient underwent dialysis on the same day his most recent stress test and echocardiogram were performed. The patient notes that if he requires repeat cardiac catheterization, groin access would be preferred because of his potential need for vascular access for dialysis in the future. He follows up with Dr. Hernández for nephrology locally.    The patient reports intermittent chest discomfort over the past approximately 2 weeks. His chest discomfort may occur at rest or with exertion and lasts for approximately 20 to 30 minutes. He denies any other symptoms associated with his chest discomfort. The patient notes shortness of breath at rest and with exertion. He reports orthopnea.    He denies palpitations.    The adult female  states that the patient is scheduled for routine annual followup with his vascular specialist later in 12/2022 for monitoring of his bilateral lower extremities. He wears compression socks.          PRIOR MEDS  Current Outpatient Medications on File Prior to Visit   Medication Sig Dispense Refill   • apixaban (ELIQUIS) 5 MG tablet tablet Take 1 tablet by mouth 2 (Two) Times a Day. 60 tablet 11   • clopidogrel (PLAVIX) 75 MG tablet Take 1 tablet by mouth Daily. 30 tablet 11   • fenofibrate 160 MG tablet Take 160 mg by mouth Daily.     • metoprolol tartrate (LOPRESSOR) 25 MG tablet Take 25 mg by mouth 2 (Two) Times a Day. 1 bid except the morning of dialysis and evening of dialysis     • multivitamin (THERAGRAN) tablet tablet Take 1 tablet by mouth Daily.     • NOVOLOG FLEXPEN 100 UNIT/ML solution pen-injector sc pen 10 Units. And sliding scale     • oxyCODONE-acetaminophen (PERCOCET) 5-325 MG per tablet As Needed.     • pantoprazole (PROTONIX) 40 MG EC tablet Take 40 mg by mouth Daily.     • predniSONE (DELTASONE) 5 MG tablet Take 5 mg by mouth Every Other Day.     • Renvela 800 MG tablet Take 800 mg by mouth 3 (Three) Times a Day. 2 tabs po w/ every meal     • rosuvastatin (CRESTOR) 20 MG tablet TAKE 1 TABLET BY MOUTH DAILY 90 tablet 3   • tacrolimus (PROGRAF) 1 MG capsule Take 0.5 mg by mouth 2 (Two) Times a Day.     • TRESIBA FLEXTOUCH 200 UNIT/ML solution pen-injector pen injection 50 Units Daily.     • [DISCONTINUED] midodrine (PROAMATINE) 2.5 MG tablet Take 1 tablet by mouth 3 (Three) Times a Day Before Meals. 90 tablet 3     No current facility-administered medications on file prior to visit.       ALLERGIES  Bactrim [sulfamethoxazole-trimethoprim]    HISTORY  Past Medical History:   Diagnosis Date   • Calciphylaxis    • Chronic kidney disease    • Coronary artery disease of bypass graft of native heart with stable angina pectoris (HCC) 04/19/2022   • Diabetes mellitus (HCC)    • End stage renal disease (HCC)   "  • Hyperlipidemia    • Hypertension    • Sleep apnea 08/09/2022    C PAP       Social History     Socioeconomic History   • Marital status:    Tobacco Use   • Smoking status: Never   • Smokeless tobacco: Former   Substance and Sexual Activity   • Alcohol use: Never   • Drug use: Never       Family History   Problem Relation Age of Onset   • Sudden death Mother    • Hypertension Father    • Sudden death Father    • Heart disease Father         UNKNOWN   • Heart attack Father         UNKNOWN       Review of Systems   Constitutional: Negative for chills, fatigue and fever.   HENT: Negative for congestion, rhinorrhea and sore throat.    Respiratory: Positive for chest tightness and shortness of breath.    Cardiovascular: Negative for chest pain, palpitations and leg swelling.   Gastrointestinal: Positive for constipation. Negative for diarrhea and nausea.   Musculoskeletal: Positive for arthralgias and back pain. Negative for neck pain.   Allergic/Immunologic: Positive for environmental allergies. Negative for food allergies.   Neurological: Positive for dizziness (getting up and down bending over), weakness and light-headedness. Negative for syncope.   Hematological: Bruises/bleeds easily.   Psychiatric/Behavioral: Positive for sleep disturbance (takes melatonin to sleep).       Objective     VITALS: /80 (BP Location: Right arm, Patient Position: Sitting)   Pulse 86   Ht 170 cm (66.93\")   Wt 89.6 kg (197 lb 9.6 oz)   SpO2 97%   BMI 31.01 kg/m²     LABS:   Lab Results (most recent)     None          IMAGING:   No Images in the past 120 days found..    EXAM:  Physical Exam  Vitals and nursing note reviewed.   Constitutional:       Appearance: He is well-developed.   HENT:      Head: Normocephalic and atraumatic.   Eyes:      Pupils: Pupils are equal, round, and reactive to light.   Neck:      Vascular: No carotid bruit or JVD.   Cardiovascular:      Rate and Rhythm: Normal rate and regular rhythm.      " Pulses:           Carotid pulses are 2+ on the right side and 2+ on the left side.       Radial pulses are 2+ on the right side and 2+ on the left side.        Posterior tibial pulses are 2+ on the right side and 2+ on the left side.      Heart sounds: Murmur heard.     No gallop.   Pulmonary:      Effort: Pulmonary effort is normal. No respiratory distress.      Breath sounds: Normal breath sounds.   Abdominal:      General: Bowel sounds are normal. There is no distension.      Palpations: Abdomen is soft.      Tenderness: There is no abdominal tenderness.   Musculoskeletal:         General: No swelling. Normal range of motion.      Cervical back: Neck supple.   Skin:     General: Skin is warm and dry.   Neurological:      Mental Status: He is alert and oriented to person, place, and time.      Cranial Nerves: No cranial nerve deficit.      Sensory: No sensory deficit.   Psychiatric:         Speech: Speech normal.         Behavior: Behavior normal.         Thought Content: Thought content normal.         Judgment: Judgment normal.         Procedure   Procedures       Assessment & Plan    Diagnosis Plan   1. Abnormal stress test  [facility]      2. SOB (shortness of breath)  [facility]      3. Coronary atherosclerosis due to calcified coronary lesion  [facility]      4. End-stage renal disease (HCC)  [facility]      5. Shortness of breath  [facility]      6. Essential hypertension  [facility]      7. Acute on chronic heart failure with preserved ejection fraction (HFpEF) ([facility])  [facility]      Plan  1. The patient's medication options are limited due to end-stage renal disease. He will continue his current medications.  Due to end-stage renal disease it was decided to defer ACE/ARB/Arni.  He will continue beta-blocker therapy with metoprolol at this time.  2.  We did discuss the patient stress test which showed a significant drop in his ejection  fraction to 29% with an extensive anteroseptal, anterior, anterolateral, lateral, and inferolateral wall myocardial infarction with significant ольга-infarct ischemia in the more basilar portions of the defect as well as anteroseptal wall and to a lesser degree, the inferolateral wall. The patient will undergo repeat left cardiac catheterization with groin access with Dr. Erickson at the [facility]. His Eliquis will be held for 3 days prior to the procedure and subsequently resumed.  3. LifeVest was offered, and printed information was provided. He will contact us if he would like to pursue this. Following cardiac catheterization, if his ejection fraction is confirmed to be below 35 percent, MUGA scan will be performed in 3 months. If at that time his ejection fraction continues to be below 35 percent, implantable cardioverter defibrillator placement will be considered.  4.  Informed of signs and symptoms of ACS and advised to seek emergent treatment for any new worsening symptoms.  Patient also advised sooner follow-up as needed.  Also advised to follow-up with family doctor as needed  This note is dictated utilizing voice recognition software.  Although this record has been proof read, transcriptional errors may still be present. If questions occur regarding the content of this record please do not hesitate to call our office.  I have reviewed and confirmed the accuracy of the ROS as documented by the MA/LPN/RN RASHAD Garza    Return if symptoms worsen or fail to improve, for cath follow up 1-2 weeks.    Diagnoses and all orders for this visit:    1. Abnormal stress test (Primary)  -     [facility] Cath; Future    2. SOB (shortness of breath)  -     [facility] Cath; Future    3. Coronary atherosclerosis due to calcified coronary lesion  -     [facility] Cath; Future    4. End-stage renal disease (HCC)  -     [facility] Cath; Future    5. Shortness of breath  -     [facility] Cath; Future    6. Essential hypertension  -     [facility] Cath; Future    7. Acute on chronic heart failure with preserved ejection fraction (HFpEF) ([facility])  -     [facility] Cath; Future        Assessment  1. Abnormal stress test  2. Coronary artery disease  3. Hypertension  4. Acute on chronic heart failure with * fraction  5. Paroxysmal atrial fibrillation  6. Shortness of breath  7. End-stage renal disease, on dialysis    Min Landon  reports that he has never smoked. He has quit using smokeless tobacco.. I have educated him on the risk of diseases from using tobacco products.          MEDS ORDERED DURING VISIT:  No orders of the defined types were placed in this encounter.          This document has been electronically signed by RASHAD Garza Jr.  December 5, 2022 13:24 EST    Transcribed from ambient dictation for RASHAD Garza by Miladys Hu.  12/05/22   13:24 EST    Patient or patient representative verbalized consent to the visit recording.  I have personally performed the services described in this document as transcribed by the above individual, and it is both accurate and complete.

## 2022-12-21 ENCOUNTER — TELEPHONE (OUTPATIENT)
Dept: CARDIOLOGY | Facility: CLINIC | Age: 52
End: 2022-12-21

## 2022-12-21 NOTE — TELEPHONE ENCOUNTER
Received cardiac clearance request from  stating pt has MOHS scheduled for 2/21/2023 and is requiring a cardiac clearance. Placed cardiac clearance request in Josey's inbox to review and address with provider.

## 2023-01-26 ENCOUNTER — OFFICE VISIT (OUTPATIENT)
Dept: CARDIOLOGY | Facility: CLINIC | Age: 53
End: 2023-01-26
Payer: MEDICARE

## 2023-01-26 VITALS
WEIGHT: 199.4 LBS | OXYGEN SATURATION: 92 % | HEART RATE: 87 BPM | BODY MASS INDEX: 31.3 KG/M2 | HEIGHT: 67 IN | DIASTOLIC BLOOD PRESSURE: 70 MMHG | SYSTOLIC BLOOD PRESSURE: 106 MMHG

## 2023-01-26 DIAGNOSIS — I25.700 ATHEROSCLEROSIS OF CABG W UNSTABLE ANGINA PECTORIS: ICD-10-CM

## 2023-01-26 DIAGNOSIS — I48.0 PAROXYSMAL ATRIAL FIBRILLATION: ICD-10-CM

## 2023-01-26 DIAGNOSIS — I10 BENIGN ESSENTIAL HYPERTENSION: ICD-10-CM

## 2023-01-26 DIAGNOSIS — I25.10 CORONARY ATHEROSCLEROSIS DUE TO CALCIFIED CORONARY LESION: Primary | ICD-10-CM

## 2023-01-26 DIAGNOSIS — I25.84 CORONARY ATHEROSCLEROSIS DUE TO CALCIFIED CORONARY LESION: Primary | ICD-10-CM

## 2023-01-26 PROCEDURE — 99214 OFFICE O/P EST MOD 30 MIN: CPT | Performed by: NURSE PRACTITIONER

## 2023-01-26 RX ORDER — INSULIN GLARGINE 100 [IU]/ML
16 INJECTION, SOLUTION SUBCUTANEOUS DAILY
COMMUNITY

## 2023-01-26 RX ORDER — INSULIN LISPRO 100 [IU]/ML
INJECTION, SOLUTION INTRAVENOUS; SUBCUTANEOUS
COMMUNITY

## 2023-01-26 RX ORDER — ASPIRIN 81 MG/1
81 TABLET ORAL DAILY
COMMUNITY

## 2023-01-26 NOTE — PROGRESS NOTES
Subjective     Min Landon is a 52 y.o. male who presents to day for Follow up after CABG (Double bypass 2 weeks ago has blood clot in right wrist ).    CHIEF COMPLIANT  Chief Complaint   Patient presents with   • Follow up after CABG     Double bypass 2 weeks ago has blood clot in right wrist        Active Problems:  Problem List Items Addressed This Visit        Cardiac and Vasculature    Atherosclerosis of coronary artery - Primary    Relevant Orders    Adult Transthoracic Echo Complete W/ Cont if Necessary Per Protocol    Benign essential hypertension    Overview     Formatting of this note might be different from the original.    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  Shortness of breath  1.1 stress test 11/21: Fixed moderate size perfusion defect lateral myocardium from the apex to the base involving portions of the anterior and inferior myocardium tiny component of reversibility at the apex of the myocardium.  Decreased wall motion at the apex and septum decreased myocardial wall thickening at the apex and mid lateral wall, EF slightly low at 48%  1.2 [facility], 1- per Dr. Saul. Unsuccessful attempt of  and revascularization of First Diag. Of the  LAD and First OM of Left Circ., distal Left Circ. 50%, PLAD 70%. Medical management.  1.3 patient evaluated by  clinic in [place] and was found not to be a candidate for revascularization of his .  1.2 echocardiogram 1/22: EF 45 to 50% lateral hypokinesis  1.3 echocardiogram 4/22: EF 45 to 50%, dilated left atrium, trivial PI, trivial TR peak PA pressures of 48 mmHg, left pleural effusion, normal diastolic function  1.4  CABG 1/10/2023: LIMA to LAD and SVG to PDA  2.  Palpitations  3.  Diabetes  mellitus  4.  Hyperlipidemia  5.  Hypertension  6.  Chronic kidney disease on dialysis  7. Atrial fibrillation [id number] score of 4, has bled score of 2    HPI  HPI     MICHAEL JURADO is a 52-year-old male patient being followed up today status post open-heart surgery on 01/10/2023. He had a two-vessel bypass with a LIMA to LAD with SVG patch angioplasty of the LAD. He also had a SVG to PDA. No targets for the circumflex to be bypassed. He also has a history of heart failure with reduced ejection fraction in which he is on metoprolol. He is on high intensity statin therapy, rosuvastatin, antiplatelet therapy, aspirin, and Plavix as well as listed Eliquis. Today, his blood pressure is 106/70 mmHg, heart rate of 87 beats per minute. He is unable to tolerate ACE/ARB/ARNI due to renal failure in which he is on the transplant list. The patient is accompanied by an adult female.    The patient states he is doing well. He states Dr. Woodruff performed his surgery. The adult female states the patient has a follow-up appointment with Dr. Woodruff on 02/09/2023. The patient states he was placed on Eliquis, Plavix, and aspirin. The adult female states the patient is in dialysis and is starting to bleed. The adult female states the patient has an appointment for cardiac rehab on 02/14/2023. The adult female states the patient has dialysis on Tuesdays, Wednesdays, and Fridays. The adult female states the patient has swelling in one of his legs. He cannot get his compression socks on.     The adult female states the patient was given Lasix in the hospital after surgery. The adult female states he went downhill from that. The adult female states later that night they thought he was having a heart attack. The adult female states they did a chest x-ray and it seemed that he had fluid that needed to come off. The adult female states they had to do emergency dialysis. The adult female states he urinates less since he had the  surgery.    He states he is still weak, but feels like he is improving daily.     The patient states right before he left the hospital, they found a superficial blood clot in his arm. The adult female states they told him it should dissolve on its own because they did an ultrasound. He states now it is starting to swell. The adult female states she was told it was from the IV or something in his arm. The adult female states the redness has been the same. He can still use his hands, but it feels like it is tingling at times. The adult female states they found it right before he left the hospital. The adult female states they were not going to let him leave. He had a test on his arm before leaving the hospital.     The patient states he has not had a chance to talk to [place] at all regarding his kidneys since having surgery.  He is currently being evaluated by them for potential renal transplant.  He is currently on dialysis.    The adult female states the edema in his legs is really bad. The adult female states he cannot get his compression socks on.     He states his shortness of breath is about the same.  It is unchanged nonprogressive.    The adult female states the patient's chest is not as tender as it was. The adult female states the patient was hardly able to stand for the first week after he was discharged from the hospital.  This is slowly gotten better.  He still is using a Rollator to assist with ambulation.    PRIOR MEDS  Current Outpatient Medications on File Prior to Visit   Medication Sig Dispense Refill   • apixaban (ELIQUIS) 5 MG tablet tablet Take 1 tablet by mouth 2 (Two) Times a Day. 60 tablet 11   • aspirin 81 MG EC tablet Take 81 mg by mouth Daily.     • clopidogrel (PLAVIX) 75 MG tablet TAKE 1 TABLET BY MOUTH DAILY 90 tablet 0   • fenofibrate 160 MG tablet Take 160 mg by mouth Daily.     • insulin glargine (LANTUS, SEMGLEE) 100 UNIT/ML injection Inject 16 Units under the skin into the  appropriate area as directed Daily.     • Insulin Lispro (humaLOG) 100 UNIT/ML injection Inject  under the skin into the appropriate area as directed 3 (Three) Times a Day Before Meals. Sliding scale     • metoprolol tartrate (LOPRESSOR) 25 MG tablet Take 25 mg by mouth 2 (Two) Times a Day. 1 bid except the morning of dialysis and evening of dialysis     • multivitamin (THERAGRAN) tablet tablet Take 1 tablet by mouth Daily.     • oxyCODONE-acetaminophen (PERCOCET) 5-325 MG per tablet As Needed.     • pantoprazole (PROTONIX) 40 MG EC tablet Take 40 mg by mouth Daily.     • predniSONE (DELTASONE) 5 MG tablet Take 5 mg by mouth Every Other Day.     • Renvela 800 MG tablet Take 800 mg by mouth 3 (Three) Times a Day. 2 tabs po w/ every meal     • rosuvastatin (CRESTOR) 20 MG tablet TAKE 1 TABLET BY MOUTH DAILY 90 tablet 3   • tacrolimus (PROGRAF) 1 MG capsule Take 0.5 mg by mouth 2 (Two) Times a Day.       No current facility-administered medications on file prior to visit.       ALLERGIES  Bactrim [sulfamethoxazole-trimethoprim]    HISTORY  Past Medical History:   Diagnosis Date   • Calciphylaxis    • Chronic kidney disease    • Coronary artery disease of bypass graft of native heart with stable angina pectoris (HCC) 04/19/2022   • Diabetes mellitus (HCC)    • End stage renal disease (HCC)    • Hyperlipidemia    • Hypertension    • Sleep apnea 08/09/2022    C PAP       Social History     Socioeconomic History   • Marital status:    Tobacco Use   • Smoking status: Never   • Smokeless tobacco: Former   Substance and Sexual Activity   • Alcohol use: Never   • Drug use: Never       Family History   Problem Relation Age of Onset   • Sudden death Mother    • Hypertension Father    • Sudden death Father    • Heart disease Father         UNKNOWN   • Heart attack Father         UNKNOWN       Review of Systems   Constitutional: Positive for fatigue. Negative for chills and fever.   HENT: Negative for congestion, rhinorrhea  "and sore throat.    Respiratory: Positive for shortness of breath. Negative for chest tightness.    Cardiovascular: Positive for leg swelling (more in right than left ). Negative for chest pain and palpitations.   Gastrointestinal: Positive for constipation. Negative for diarrhea and nausea.   Musculoskeletal: Positive for arthralgias, back pain and neck pain.   Allergic/Immunologic: Positive for environmental allergies. Negative for food allergies.   Neurological: Positive for weakness. Negative for dizziness, syncope and light-headedness.   Hematological: Bruises/bleeds easily (bruise).   Psychiatric/Behavioral: Positive for sleep disturbance (is having a lot of pain ).       Objective     VITALS: /70 (BP Location: Right arm, Patient Position: Sitting, Cuff Size: Adult)   Pulse 87   Ht 170 cm (66.93\")   Wt 90.4 kg (199 lb 6.4 oz)   SpO2 92%   BMI 31.30 kg/m²     LABS:   Lab Results (most recent)     None          IMAGING:   XR Abdomen 1 View    Result Date: 1/11/2023  Nonobstructive bowel gas pattern. No convincing pneumatosis or pneumoperitoneum. CRITICAL RESULT:   No. COMMUNICATION: Per this written report. Dictated by Ming Interiano MD on 1/11/2023 10:15 AM Signed by Ming Interiano MD on 1/11/2023 10:20 AM    XR Abdomen 1 View    Result Date: 1/10/2023  Nonobstructive bowel gas pattern. Air within nondistended stomach. CRITICAL RESULT:   No. COMMUNICATION: Per this written report. Dictated by Sue Quinn MD on 1/10/2023 6:45 PM Signed by Sue Quinn MD on 1/10/2023 6:46 PM    XR Chest 1 View    Result Date: 1/16/2023  Stable lung fields CRITICAL RESULT:   No. COMMUNICATION: Per this written report. ATTESTATION: Not applicable. Dictated by Viki Braga MD on 1/16/2023 9:46 AM Signed by Viki Braga MD on 1/16/2023 9:47 AM    XR Chest 1 View    Result Date: 1/15/2023  Stable exam CRITICAL RESULT:   No. COMMUNICATION: Per this written report. ATTESTATION: Not applicable. Dictated by Viki" MD Omi on 1/15/2023 8:50 AM Signed by Viki Braga MD on 1/15/2023 8:51 AM    XR Chest 1 View    Result Date: 1/14/2023  Stable exam CRITICAL RESULT:   No. COMMUNICATION: Per this written report. ATTESTATION: Not applicable. Dictated by Viki Braga MD on 1/14/2023 9:54 AM Signed by Viki Braga MD on 1/14/2023 9:55 AM    XR Chest 1 View    Result Date: 1/13/2023  Stable exam CRITICAL RESULT:   No. COMMUNICATION: Per this written report. By electronically signing this report, I, the attending physician, attest that I have personally reviewed the images/data for the above examination(s) and agree with the final edited report. Dictated by Mohan Tovar DO on 1/13/2023 9:24 AM Signed by Pedro Luis Perrin MD on 1/13/2023 9:51 AM    XR Chest 1 View    Result Date: 1/12/2023  Worsening pulmonary vascular congestion, or space opacities and pleural effusions suggesting edema. CRITICAL RESULT:   No. COMMUNICATION: Per this written report. Dictated by Sue Quinn MD on 1/12/2023 5:56 PM Signed by Sue Quinn MD on 1/12/2023 5:57 PM    XR Chest 1 View    Result Date: 1/12/2023  Stable exam CRITICAL RESULT:   No COMMUNICATION: Per this written report. Dictated by Alba Arellano MD on 1/12/2023 8:26 AM Signed by Alba Arellano MD on 1/12/2023 8:27 AM    XR Chest 1 View    Result Date: 1/11/2023  Support device changes as above, stable exam. CRITICAL RESULT:   No. COMMUNICATION: Per this written report. By electronically signing this report, I, the attending physician, attest that I have personally reviewed the images/data for the above examination(s) and agree with the final edited report. Dictated by Mohan Tovar DO on 1/11/2023 10:12 AM Signed by Cinthia Guaman MD on 1/11/2023 10:29 AM    XR Chest 1 View    Result Date: 1/10/2023  Endotracheal tube with tip 4.5 cm above the jacque. Hypoventilatory changes. No acute findings. CRITICAL RESULT:   No. COMMUNICATION: Per this written report. Dictated by  Sue uQinn MD on 1/10/2023 6:44 PM Signed by Sue Quinn MD on 1/10/2023 6:45 PM    Duplex Upper Extremity Art / Grafts - Right CAR    Result Date: 1/17/2023  Right: Abnormal study. No flow is detected in the radial artery at the wrist. There is flow in the radial artery at the mid forearm. All other arteries are patent. COMMUNICATION: YEHUDA Marinelli notified of findings. RB Approved by Rere Wallace RVT on 1/17/2023 12:42 PM By electronically signing this report, I, the attending physician, attest that I have personally reviewed the images/data for the above examination(s) and I agree with the final edited report. Dictated by Rere Wallace RVT on 1/17/2023 12:42 PM Signed by Abdi Contreras MD, ROHAN, FSVS, RPVI on 1/17/2023 3:35 PM    Doppler Ankle Brachial Index Single Level CAR    Result Date: 1/17/2023  Right: Abnormal study; evidence of hemodynamically significant arterial insufficiency of the Radial artery. Evidence of medial calcinosis of the ulnar artery. 1 st digit is moderately reduced. All other digits are within normal limits. Left: Patient has dialysis AVF in arm. All digit pressures are within normal limits. COMMUNICATION: Per this written report. Approved by Rere Wallace RVT on 1/17/2023 2:42 PM By electronically signing this report, I, the attending physician, attest that I have personally reviewed the images/data for the above examination(s) and I agree with the final edited report. Dictated by Rere Wallace RVT on 1/17/2023 2:42 PM Signed by Abdi Cotnreras MD, FACS, FSUTE, RPVI on 1/17/2023 3:34 PM      EXAM:  Physical Exam  Vitals and nursing note reviewed.   Constitutional:       Appearance: He is well-developed.   HENT:      Head: Normocephalic and atraumatic.   Eyes:      Pupils: Pupils are equal, round, and reactive to light.   Neck:      Thyroid: No thyroid mass.      Vascular: No carotid bruit or JVD.      Trachea: Trachea and phonation normal.   Cardiovascular:      Rate and  Rhythm: Normal rate and regular rhythm.      Pulses:           Carotid pulses are 2+ on the right side and 2+ on the left side.       Radial pulses are 2+ on the right side and 2+ on the left side.        Posterior tibial pulses are 2+ on the right side and 2+ on the left side.      Heart sounds: Murmur heard.     No friction rub. No gallop.   Pulmonary:      Effort: Pulmonary effort is normal. No respiratory distress.      Breath sounds: Normal breath sounds. No wheezing or rales.   Abdominal:      General: Bowel sounds are normal. There is no distension.      Palpations: Abdomen is soft.      Tenderness: There is no abdominal tenderness.   Musculoskeletal:         General: Normal range of motion.      Cervical back: Neck supple.      Right lower leg: Edema (2+ BLE R>L) present.      Left lower leg: Edema (2+) present.   Skin:     General: Skin is warm and dry.      Capillary Refill: Capillary refill takes less than 2 seconds.      Findings: No rash.          Neurological:      Mental Status: He is alert and oriented to person, place, and time.      Cranial Nerves: No cranial nerve deficit.      Sensory: No sensory deficit.   Psychiatric:         Speech: Speech normal.         Behavior: Behavior normal.         Thought Content: Thought content normal.         Judgment: Judgment normal.         Procedure   Procedures       Assessment & Plan    Diagnosis Plan   1. Coronary atherosclerosis due to calcified coronary lesion  Adult Transthoracic Echo Complete W/ Cont if Necessary Per Protocol      2. 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 Benign essential hypertension        4. Paroxysmal atrial fibrillation (HCC)             1. The patient was advised that if his area in his arm starts to hurt or cause him issues to call the office to have an ultrasound obtained.  He is anticoagulated on Eliquis Plavix and aspirin.  2. The  patient was advised to discuss his medications with Dr. Woodruff at his follow up appointment.  To see what the longevity of taking Eliquis, Plavix, and aspirin.  3. He will have an echocardiogram obtained in early 03/2023 to establish baseline after surgery and to reevaluate ejection fraction..  4.  Patient did have coronary artery bypass grafting and seems to be doing relatively well.  He reports that he is slowly recovering.  However shortness of breath is unchanged but nonprogressive.  At this time we will continue to monitor and repeat echocardiogram as mentioned above.  He will continue his current medications without change.  5.  Patient's blood pressure is controlled on current blood pressure medication regimen.  No medication changes are warranted at this time.  Patient advised to monitor blood pressure on a daily basis and report any persistent highs or lows.  Set goal blood pressure for patient at 130/80 or below.  6.  Patient's heart failure with reduced ejection fraction is being treated with metoprolol.  Due to his renal failure unable to use ACE/ARB/Arni.  7.  Informed of signs and symptoms of ACS and advised to seek emergent treatment for any new worsening symptoms.  Patient also advised sooner follow-up as needed.  Also advised to follow-up with family doctor as needed  This note is dictated utilizing voice recognition software.  Although this record has been proof read, transcriptional errors may still be present. If questions occur regarding the content of this record please do not hesitate to call our office.  I have reviewed and confirmed the accuracy of the ROS as documented by the MA/LPN/RN RASHAD Garza    Return in about 8 weeks (around 3/23/2023), or if symptoms worsen or fail to improve.    Diagnoses and all orders for this visit:    1. Coronary atherosclerosis due to calcified coronary lesion (Primary)  -     Adult Transthoracic Echo Complete W/ Cont if Necessary Per Protocol;  Future    2. 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 Benign essential hypertension    4. Paroxysmal atrial fibrillation (HCC)        Min Landon  reports that he has never smoked. He has quit using smokeless tobacco.. I have educated him on the risk of diseases from using tobacco products.        MEDS ORDERED DURING VISIT:  No orders of the defined types were placed in this encounter.          This document has been electronically signed by RASHAD Garza Jr.  January 28, 2023 01:23 EST    Transcribed from ambient dictation for RASHAD Garza by [employer] .  01/26/23   17:10 EST    Patient or patient representative verbalized consent to the visit recording.  I have personally performed the services described in this document as transcribed by the above individual, and it is both accurate and complete.

## 2023-01-30 ENCOUNTER — TELEPHONE (OUTPATIENT)
Dept: CARDIOLOGY | Facility: CLINIC | Age: 53
End: 2023-01-30
Payer: MEDICARE

## 2023-01-30 NOTE — TELEPHONE ENCOUNTER
Caller: Min Landon    Relationship: Self    Best call back number: [phone]    PATIENTS LEG HAS GOTTEN REALLY RED, HE SAID IT IS NOT HOT TO THE TOUCH AND HE DOESN'T BELIEVE ITS INFECTED, RATHER HE THINKS IT ID CELLULITUS. PLEASE CALL PT BACK TO DISCUSS.

## 2023-01-31 NOTE — TELEPHONE ENCOUNTER
I called patient and advised that he needs to follow up with surgeon or PCP with it being a graft site. I advised that it would need to be watched closely.    Josey NAGEL

## 2023-01-31 NOTE — TELEPHONE ENCOUNTER
Pretty sure that we discussed this in person.  If patient feels that this is gotten worse and has not followed up with his PCP or surgeon I would like to do a nurse visit to reevaluate this.  This is not an area where he had vein harvesting for his bypass.

## 2023-02-16 ENCOUNTER — TELEPHONE (OUTPATIENT)
Dept: CARDIOLOGY | Facility: CLINIC | Age: 53
End: 2023-02-16
Payer: MEDICARE

## 2023-02-16 NOTE — TELEPHONE ENCOUNTER
----- Message from Lexii Vasquez MA sent at 2/15/2023 11:11 AM EST -----  Camelia with [facility] called to check status of clearance. Surgery next week. Clearance faxed back in December. She can be reached at [phone]      I spoke with JR about this patient had recent CABG and he is not comfortable clearing patient without talking with Dr Su about this. He will discuss this with Dr Su and we will get back with them.      Josey NAGEL

## 2023-03-02 ENCOUNTER — HOSPITAL ENCOUNTER (OUTPATIENT)
Dept: CARDIOLOGY | Facility: HOSPITAL | Age: 53
Discharge: HOME | End: 2023-03-02
Admitting: NURSE PRACTITIONER
Payer: MEDICARE

## 2023-03-02 VITALS — HEIGHT: 67 IN | BODY MASS INDEX: 31.28 KG/M2 | WEIGHT: 199.3 LBS

## 2023-03-02 DIAGNOSIS — I25.84 CORONARY ATHEROSCLEROSIS DUE TO CALCIFIED CORONARY LESION: ICD-10-CM

## 2023-03-02 DIAGNOSIS — I25.700 ATHEROSCLEROSIS OF CABG W UNSTABLE ANGINA PECTORIS: ICD-10-CM

## 2023-03-02 DIAGNOSIS — I25.10 CORONARY ATHEROSCLEROSIS DUE TO CALCIFIED CORONARY LESION: ICD-10-CM

## 2023-03-02 PROCEDURE — 93306 TTE W/DOPPLER COMPLETE: CPT

## 2023-03-02 PROCEDURE — 93306 TTE W/DOPPLER COMPLETE: CPT | Performed by: INTERNAL MEDICINE

## 2023-03-09 LAB
AORTIC DIMENSIONLESS INDEX: 0.84 (DI)
BH CV ECHO MEAS - ACS: 1.43 CM
BH CV ECHO MEAS - AO MAX PG: 4.8 MMHG
BH CV ECHO MEAS - AO MEAN PG: 2.49 MMHG
BH CV ECHO MEAS - AO ROOT DIAM: 2.8 CM
BH CV ECHO MEAS - AO V2 MAX: 109.2 CM/SEC
BH CV ECHO MEAS - AO V2 VTI: 22.5 CM
BH CV ECHO MEAS - EDV(CUBED): 147 ML
BH CV ECHO MEAS - ESV(CUBED): 55.4 ML
BH CV ECHO MEAS - FS: 27.8 %
BH CV ECHO MEAS - IVS/LVPW: 0.86 CM
BH CV ECHO MEAS - IVSD: 1.12 CM
BH CV ECHO MEAS - LA DIMENSION: 4.6 CM
BH CV ECHO MEAS - LAT PEAK E' VEL: 7.7 CM/SEC
BH CV ECHO MEAS - LV MASS(C)D: 258.4 GRAMS
BH CV ECHO MEAS - LV MAX PG: 3.4 MMHG
BH CV ECHO MEAS - LV MEAN PG: 1.33 MMHG
BH CV ECHO MEAS - LV V1 MAX: 92.1 CM/SEC
BH CV ECHO MEAS - LV V1 VTI: 19.1 CM
BH CV ECHO MEAS - LVIDD: 5.3 CM
BH CV ECHO MEAS - LVIDS: 3.8 CM
BH CV ECHO MEAS - LVPWD: 1.3 CM
BH CV ECHO MEAS - MED PEAK E' VEL: 7.3 CM/SEC
BH CV ECHO MEAS - MR MAX PG: 57.4 MMHG
BH CV ECHO MEAS - MR MAX VEL: 378.7 CM/SEC
BH CV ECHO MEAS - MV A MAX VEL: 110.6 CM/SEC
BH CV ECHO MEAS - MV DEC SLOPE: 816.9 CM/SEC2
BH CV ECHO MEAS - MV DEC TIME: 0.23 MSEC
BH CV ECHO MEAS - MV E MAX VEL: 169 CM/SEC
BH CV ECHO MEAS - MV E/A: 1.53
BH CV ECHO MEAS - MV MAX PG: 12.8 MMHG
BH CV ECHO MEAS - MV MEAN PG: 3.8 MMHG
BH CV ECHO MEAS - MV P1/2T: 65.6 MSEC
BH CV ECHO MEAS - MV V2 VTI: 40.2 CM
BH CV ECHO MEAS - MVA(P1/2T): 3.4 CM2
BH CV ECHO MEAS - PA V2 MAX: 88.9 CM/SEC
BH CV ECHO MEAS - RAP SYSTOLE: 8 MMHG
BH CV ECHO MEAS - RV MAX PG: 1.83 MMHG
BH CV ECHO MEAS - RV V1 MAX: 67.7 CM/SEC
BH CV ECHO MEAS - RV V1 VTI: 15.5 CM
BH CV ECHO MEAS - RVDD: 3.3 CM
BH CV ECHO MEAS - RVSP: 45.3 MMHG
BH CV ECHO MEAS - TAPSE (>1.6): 1.43 CM
BH CV ECHO MEAS - TR MAX PG: 37.3 MMHG
BH CV ECHO MEAS - TR MAX VEL: 305.3 CM/SEC
BH CV ECHO MEASUREMENTS AVERAGE E/E' RATIO: 22.53
BH CV XLRA - TDI S': 6.9 CM/SEC
LV EF 2D ECHO EST: 53 %
MAXIMAL PREDICTED HEART RATE: 168 BPM
SINUS: 2.6 CM
STRESS TARGET HR: 143 BPM

## 2023-03-13 NOTE — PROGRESS NOTES
Patient's ejection fraction 35±5%, grade 2 diastolic dysfunction, no hemodynamically significant valvular disease.  Pulmonary systolic pressures are in the mid 30s.  We will discuss further on follow-up marked 16 2023

## 2023-03-14 ENCOUNTER — TELEPHONE (OUTPATIENT)
Dept: CARDIOLOGY | Facility: CLINIC | Age: 53
End: 2023-03-14
Payer: MEDICARE

## 2023-03-14 NOTE — TELEPHONE ENCOUNTER
----- Message from RASHAD Garza sent at 3/13/2023 12:40 PM EDT -----  Patient's ejection fraction 35±5%, grade 2 diastolic dysfunction, no hemodynamically significant valvular disease.  Pulmonary systolic pressures are in the mid 30s.  We will discuss further on follow-up marked 16 2023.    I called and spoke with patient advised no hemodynamically significant valvular disease. Pulmonary systolic pressures in mid 30's. I advised that JR would go over report in more detail at March 16th visit.

## 2023-03-16 ENCOUNTER — OFFICE VISIT (OUTPATIENT)
Dept: CARDIOLOGY | Facility: CLINIC | Age: 53
End: 2023-03-16
Payer: MEDICARE

## 2023-03-16 VITALS
WEIGHT: 192.2 LBS | DIASTOLIC BLOOD PRESSURE: 81 MMHG | BODY MASS INDEX: 30.17 KG/M2 | HEART RATE: 81 BPM | HEIGHT: 67 IN | SYSTOLIC BLOOD PRESSURE: 136 MMHG | OXYGEN SATURATION: 96 %

## 2023-03-16 DIAGNOSIS — I25.708 CORONARY ARTERY DISEASE OF BYPASS GRAFT OF NATIVE HEART WITH STABLE ANGINA PECTORIS: Primary | Chronic | ICD-10-CM

## 2023-03-16 DIAGNOSIS — I48.0 PAROXYSMAL ATRIAL FIBRILLATION: ICD-10-CM

## 2023-03-16 DIAGNOSIS — Z95.1 HX OF CABG: ICD-10-CM

## 2023-03-16 DIAGNOSIS — I10 BENIGN ESSENTIAL HYPERTENSION: ICD-10-CM

## 2023-03-16 PROCEDURE — 1159F MED LIST DOCD IN RCRD: CPT | Performed by: NURSE PRACTITIONER

## 2023-03-16 PROCEDURE — 3075F SYST BP GE 130 - 139MM HG: CPT | Performed by: NURSE PRACTITIONER

## 2023-03-16 PROCEDURE — 3079F DIAST BP 80-89 MM HG: CPT | Performed by: NURSE PRACTITIONER

## 2023-03-16 PROCEDURE — 99213 OFFICE O/P EST LOW 20 MIN: CPT | Performed by: NURSE PRACTITIONER

## 2023-03-16 PROCEDURE — 1160F RVW MEDS BY RX/DR IN RCRD: CPT | Performed by: NURSE PRACTITIONER

## 2023-03-16 NOTE — PROGRESS NOTES
Subjective     Min Landon is a 52 y.o. male who presents to day for 2 month follow up  and Coronary atherosclerosis  due to calcified lesion.    CHIEF COMPLIANT  Chief Complaint   Patient presents with   • 2 month follow up    • Coronary atherosclerosis  due to calcified lesion       Active Problems:  Problem List Items Addressed This Visit        Cardiac and Vasculature    Coronary artery disease of bypass graft of native heart with stable angina pectoris (HCC) - Primary (Chronic)    Benign essential hypertension    Overview     Formatting of this note might be different from the original.    Essential (primary) hypertension        Other Visit Diagnoses     Hx of CABG        Paroxysmal atrial fibrillation (HCC)          Problem list:     1.  Shortness of breath  1.1 stress test 11/21: Fixed moderate size perfusion defect lateral myocardium from the apex to the base involving portions of the anterior and inferior myocardium tiny component of reversibility at the apex of the myocardium.  Decreased wall motion at the apex and septum decreased myocardial wall thickening at the apex and mid lateral wall, EF slightly low at 48%  1.2 [facility], 1- per Dr. Saul. Unsuccessful attempt of  and revascularization of First Diag. Of the  LAD and First OM of Left Circ., distal Left Circ. 50%, PLAD 70%. Medical management.  1.3 patient evaluated by  clinic in [place] and was found not to be a candidate for revascularization of his .  1.2 echocardiogram 1/22: EF 45 to 50% lateral hypokinesis  1.3 echocardiogram 4/22: EF 45 to 50%, dilated left atrium, trivial PI, trivial TR peak PA pressures of 48 mmHg, left pleural effusion, normal diastolic function  1.4  CABG 1/10/2023: LIMA to LAD and SVG to PDA  2.  Palpitations  3.  Diabetes mellitus  4.  Hyperlipidemia  5.  Hypertension  6.  Chronic kidney disease on dialysis  7. Atrial fibrillation [id number] score of 4, has bled score of 2    HPI  HPI     Mr. Copeland  Dipesh presents today for follow-up of coronary artery disease in which he does have a history of CABG. He underwent coronary artery bypass grafting in 01/2023 and had a LIMA to LAD and SVG to PDA. We repeated an echocardiogram postoperatively, which identified his ejection fraction has reduced to 35 +/- 5 percent with grade 2 diastolic dysfunction, mild to moderate left atrial enlargement, trivial MR, trivial AI, and mild TR with pulmonary pressures in the mid 30 mmHg range.     He also has a has a history of atrial fibrillation in which he has a [id number] score of 4 and a HAS-BLED score of 2. Currently, he is being treated with aspirin 81 mg daily, Plavix for dual antiplatelet therapy, metoprolol for beta-blocker therapy, and rosuvastatin for high intensity statin therapy. He does have chronic arterial hypertension, which is treated with the metoprolol and his blood pressure is 136/81 mmHg today with a heart rate of 81 BPM.    The patient states he is recovering well and feeling better than he was prior to the CABG he underwent in 01/2023. He also reports his breathing has improved even at rest noting his oxygen has remained stable without the use of supplemental oxygen. Currently, he attends cardiac rehab in [place], [place] 3 days per week and confirms it is going well. The adult female adds he is no longer using a walker to ambulate. While at rehab, he notes gradually building up to 10 minutes on each exercise including the elliptical, using a sit-down stepper, sit-down bicycle, and using hand weights with a heart monitor on. The facility also offers some nutritional education on Friday's, which includes information regarding reading labels. The patient denies angina with any of his rehab exercises and the adult female is curious what follow-up looks like here on out.     He does have a history of blood clot in the left wrist. Due to this, he underwent a venous doppler of the left upper extremity, which  revealed an occluded radial artery. Of note, Eliquis was discontinued while he was admitted and replaced with Plavix and aspirin. The patient is unsure whether an atrial appendage closure was performed during the CABG procedure though the adult female feels they discussed it prior to the procedure. He also recalls being advised he is no longer in atrial fibrillation. Mr. Landon also wonders if chewable aspirin is okay to take of if there is a preference.     Regarding his transplant team, the patient reports he has spoken with them, they are aware of his procedure and current circumstances, and have advised him he will have to be revaluated after completing cardiac rehab. Mr. Landon is currently undergoing dialysis 3 days per week.     He denies any fatigue, chest pain, lower extremity edema, palpitations, dizziness, lightheadedness, syncope, or strokelike symptoms.    PRIOR MEDS  Current Outpatient Medications on File Prior to Visit   Medication Sig Dispense Refill   • aspirin 81 MG EC tablet Take 1 tablet by mouth Daily.     • clopidogrel (PLAVIX) 75 MG tablet TAKE 1 TABLET BY MOUTH DAILY 90 tablet 0   • fenofibrate 160 MG tablet Take 1 tablet by mouth Daily.     • insulin glargine (LANTUS, SEMGLEE) 100 UNIT/ML injection Inject 16 Units under the skin into the appropriate area as directed Daily.     • Insulin Lispro (humaLOG) 100 UNIT/ML injection Inject  under the skin into the appropriate area as directed 3 (Three) Times a Day Before Meals. Sliding scale     • metoprolol tartrate (LOPRESSOR) 25 MG tablet Take 1 tablet by mouth 2 (Two) Times a Day. 1 bid except the morning of dialysis and evening of dialysis     • multivitamin (THERAGRAN) tablet tablet Take 1 tablet by mouth Daily.     • oxyCODONE-acetaminophen (PERCOCET) 5-325 MG per tablet As Needed.     • pantoprazole (PROTONIX) 40 MG EC tablet Take 1 tablet by mouth Daily.     • predniSONE (DELTASONE) 5 MG tablet Take 1 tablet by mouth Every Other Day.     •  Renvela 800 MG tablet Take 1 tablet by mouth 3 (Three) Times a Day. 2 tabs po w/ every meal     • rosuvastatin (CRESTOR) 20 MG tablet TAKE 1 TABLET BY MOUTH DAILY 90 tablet 3   • tacrolimus (PROGRAF) 1 MG capsule Take 0.5 mg by mouth 2 (Two) Times a Day.       No current facility-administered medications on file prior to visit.       ALLERGIES  Bactrim [sulfamethoxazole-trimethoprim]    HISTORY  Past Medical History:   Diagnosis Date   • Calciphylaxis    • Chronic kidney disease    • Coronary artery disease of bypass graft of native heart with stable angina pectoris (HCC) 04/19/2022   • Diabetes mellitus (HCC)    • End stage renal disease (HCC)    • Hyperlipidemia    • Hypertension    • Sleep apnea 08/09/2022    C PAP       Social History     Socioeconomic History   • Marital status:    Tobacco Use   • Smoking status: Never   • Smokeless tobacco: Former   Substance and Sexual Activity   • Alcohol use: Never   • Drug use: Never       Family History   Problem Relation Age of Onset   • Sudden death Mother    • Hypertension Father    • Sudden death Father    • Heart disease Father         UNKNOWN   • Heart attack Father         UNKNOWN       Review of Systems   Constitutional: Negative for chills, fatigue and fever.   HENT: Negative for congestion, rhinorrhea and sore throat.    Respiratory: Positive for apnea (C PAP) and shortness of breath. Negative for chest tightness.    Cardiovascular: Negative for chest pain, palpitations and leg swelling.   Gastrointestinal: Positive for constipation. Negative for diarrhea and nausea.   Musculoskeletal: Positive for arthralgias and back pain. Negative for neck pain.   Allergic/Immunologic: Positive for environmental allergies. Negative for food allergies.   Neurological: Positive for weakness. Negative for dizziness, syncope and light-headedness.   Hematological: Bruises/bleeds easily.   Psychiatric/Behavioral: Positive for sleep disturbance (No SOB just not sleeping  "well).       Objective     VITALS: /81 (BP Location: Right arm, Patient Position: Sitting, Cuff Size: Adult)   Pulse 81   Ht 170 cm (66.93\")   Wt 87.2 kg (192 lb 3.2 oz)   SpO2 96%   BMI 30.17 kg/m²     LABS:   Lab Results (most recent)     None          IMAGING:   XR Chest 2 View    Result Date: 2/9/2023  Decreased left and persistent right pleural effusions and basilar opacities. CRITICAL RESULT:   No COMMUNICATION: Per this written report. Dictated by Alba Arellano MD on 2/9/2023 12:25 PM Signed by Alba Arellano MD on 2/9/2023 12:26 PM    XR Abdomen 1 View    Result Date: 1/11/2023  Nonobstructive bowel gas pattern. No convincing pneumatosis or pneumoperitoneum. CRITICAL RESULT:   No. COMMUNICATION: Per this written report. Dictated by Ming Interiano MD on 1/11/2023 10:15 AM Signed by Ming Interiano MD on 1/11/2023 10:20 AM    XR Abdomen 1 View    Result Date: 1/10/2023  Nonobstructive bowel gas pattern. Air within nondistended stomach. CRITICAL RESULT:   No. COMMUNICATION: Per this written report. Dictated by Sue Quinn MD on 1/10/2023 6:45 PM Signed by Sue Quinn MD on 1/10/2023 6:46 PM    XR Chest 1 View    Result Date: 1/16/2023  Stable lung fields CRITICAL RESULT:   No. COMMUNICATION: Per this written report. ATTESTATION: Not applicable. Dictated by Viki Braga MD on 1/16/2023 9:46 AM Signed by Viki Braga MD on 1/16/2023 9:47 AM    XR Chest 1 View    Result Date: 1/15/2023  Stable exam CRITICAL RESULT:   No. COMMUNICATION: Per this written report. ATTESTATION: Not applicable. Dictated by Viki Braga MD on 1/15/2023 8:50 AM Signed by Viki Braga MD on 1/15/2023 8:51 AM    XR Chest 1 View    Result Date: 1/14/2023  Stable exam CRITICAL RESULT:   No. COMMUNICATION: Per this written report. ATTESTATION: Not applicable. Dictated by Viki Braga MD on 1/14/2023 9:54 AM Signed by Viki Braga MD on 1/14/2023 9:55 AM    XR Chest 1 View    Result Date: 1/13/2023  Stable exam " CRITICAL RESULT:   No. COMMUNICATION: Per this written report. By electronically signing this report, I, the attending physician, attest that I have personally reviewed the images/data for the above examination(s) and agree with the final edited report. Dictated by Mohan Tovar DO on 1/13/2023 9:24 AM Signed by Pedro Luis Perrin MD on 1/13/2023 9:51 AM    XR Chest 1 View    Result Date: 1/12/2023  Worsening pulmonary vascular congestion, or space opacities and pleural effusions suggesting edema. CRITICAL RESULT:   No. COMMUNICATION: Per this written report. Dictated by Sue Quinn MD on 1/12/2023 5:56 PM Signed by Sue Quinn MD on 1/12/2023 5:57 PM    XR Chest 1 View    Result Date: 1/12/2023  Stable exam CRITICAL RESULT:   No COMMUNICATION: Per this written report. Dictated by Alba Arellano MD on 1/12/2023 8:26 AM Signed by Alba Arellano MD on 1/12/2023 8:27 AM    XR Chest 1 View    Result Date: 1/11/2023  Support device changes as above, stable exam. CRITICAL RESULT:   No. COMMUNICATION: Per this written report. By electronically signing this report, I, the attending physician, attest that I have personally reviewed the images/data for the above examination(s) and agree with the final edited report. Dictated by Mohan Tovar DO on 1/11/2023 10:12 AM Signed by Cinthia Guaman MD on 1/11/2023 10:29 AM    XR Chest 1 View    Result Date: 1/10/2023  Endotracheal tube with tip 4.5 cm above the jacque. Hypoventilatory changes. No acute findings. CRITICAL RESULT:   No. COMMUNICATION: Per this written report. Dictated by Sue Quinn MD on 1/10/2023 6:44 PM Signed by Sue Quinn MD on 1/10/2023 6:45 PM    Duplex Upper Extremity Art / Grafts - Right CAR    Result Date: 1/17/2023  Right: Abnormal study. No flow is detected in the radial artery at the wrist. There is flow in the radial artery at the mid forearm. All other arteries are patent. COMMUNICATION: YEHUDA Marinelli notified of findings. RB  Approved by Rere Wallace RVT on 1/17/2023 12:42 PM By electronically signing this report, I, the attending physician, attest that I have personally reviewed the images/data for the above examination(s) and I agree with the final edited report. Dictated by Rere Wallace RVT on 1/17/2023 12:42 PM Signed by Abdi Contreras MD, ROHAN, FSVS, RPVI on 1/17/2023 3:35 PM    Doppler Ankle Brachial Index Single Level CAR    Result Date: 1/17/2023  Right: Abnormal study; evidence of hemodynamically significant arterial insufficiency of the Radial artery. Evidence of medial calcinosis of the ulnar artery. 1 st digit is moderately reduced. All other digits are within normal limits. Left: Patient has dialysis AVF in arm. All digit pressures are within normal limits. COMMUNICATION: Per this written report. Approved by Rere Wallace RVT on 1/17/2023 2:42 PM By electronically signing this report, I, the attending physician, attest that I have personally reviewed the images/data for the above examination(s) and I agree with the final edited report. Dictated by Rere Wallace RVT on 1/17/2023 2:42 PM Signed by Abdi Contreras MD, ROHAN, FSVS, RPVI on 1/17/2023 3:34 PM    Adult Transesophageal Echo (IRIS) W/ Cont if Necessary Per Protocol (01/10/2023 09:16)  Adult Transthoracic Echo Complete W/ Cont if Necessary Per Protocol (03/02/2023 09:40)    EXAM:  Physical Exam  Vitals and nursing note reviewed.   Constitutional:       Appearance: He is well-developed.   HENT:      Head: Normocephalic and atraumatic.   Eyes:      Pupils: Pupils are equal, round, and reactive to light.   Neck:      Vascular: No carotid bruit or JVD.   Cardiovascular:      Rate and Rhythm: Normal rate and regular rhythm.      Pulses:           Carotid pulses are 2+ on the right side and 2+ on the left side.       Radial pulses are 0 on the right side and 2+ on the left side.        Posterior tibial pulses are 2+ on the right side and 2+ on the left side.      Heart  sounds: Murmur heard.     No gallop.   Pulmonary:      Effort: Pulmonary effort is normal. No respiratory distress.      Breath sounds: Normal breath sounds.   Abdominal:      General: Bowel sounds are normal. There is no distension.      Palpations: Abdomen is soft.      Tenderness: There is no abdominal tenderness.   Musculoskeletal:         General: Normal range of motion.      Cervical back: Neck supple.   Skin:     General: Skin is warm and dry.   Neurological:      Mental Status: He is alert and oriented to person, place, and time.      Cranial Nerves: No cranial nerve deficit.      Sensory: No sensory deficit.   Psychiatric:         Speech: Speech normal.         Behavior: Behavior normal.         Thought Content: Thought content normal.         Judgment: Judgment normal.         Procedure   Procedures       Assessment & Plan    Diagnosis Plan   1. 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 Hx of CABG        3. Benign essential hypertension        4. Paroxysmal atrial fibrillation (HCC)             Plan  1. The patient's ejection fraction has reduced by 35 +/- percent and because he has been diagnosed with end-stage renal disease, ace inhibitors and angiotensin receptor blockers are contraindicated. Jardiance and Farxiga are also not recommended and spironolactone could significantly raise his potassium as he does undergo dialysis. Therefore, metoprolol is the only appropriate medication we can prescribe to avoid causing additional concerns. He will continue metoprolol, Plavix, and daily aspirin at this time. Continue follow-up as scheduled.  Of note, further review of op notes is required to ensure an atrial appendage closure was completed intraoperatively.     2. Mr. Landon will continue dialysis as advised by Nephrology and continue follow-up with his transplant team as scheduled.      3.  Patient does have a history of atrial fibrillation in which  his Eliquis was stopped after surgery.  He is on aspirin and Plavix for dual antiplatelet therapy.    4.  Patient's blood pressure is controlled on current blood pressure medication regimen.  No medication changes are warranted at this time.  Patient advised to monitor blood pressure on a daily basis and report any persistent highs or lows.  Set goal blood pressure for patient at 130/80 or below.    5.  Informed of signs and symptoms of ACS and advised to seek emergent treatment for any new worsening symptoms.  Patient also advised sooner follow-up as needed.  Also advised to follow-up with family doctor as needed  This note is dictated utilizing voice recognition software.  Although this record has been proof read, transcriptional errors may still be present. If questions occur regarding the content of this record please do not hesitate to call our office.  I have reviewed and confirmed the accuracy of the ROS as documented by the MA/LPN/RN RASHAD Garza    Assessment  1. Coronary artery disease: Status post CABG  2. End stage renal disease        Return in about 3 months (around 6/16/2023), or if symptoms worsen or fail to improve.    Diagnoses and all orders for this visit:    1. Coronary artery disease of bypass graft of native heart with stable angina pectoris (HCC) (Primary)    2. Hx of CABG    3. Benign essential hypertension    4. Paroxysmal atrial fibrillation (HCC)        Min Landon  reports that he has never smoked. He has quit using smokeless tobacco.. I have educated him on the risk of diseases from using tobacco products.          MEDS ORDERED DURING VISIT:  No orders of the defined types were placed in this encounter.          This document has been electronically signed by RASHAD Garza Jr.  March 19, 2023 20:49 EDT    Transcribed from ambient dictation for RASHAD Garza by Jen Villasenor.  03/16/23   15:16 EDT    Patient or patient representative verbalized consent to the visit  recording.

## 2023-05-11 ENCOUNTER — TELEPHONE (OUTPATIENT)
Dept: CARDIOLOGY | Facility: CLINIC | Age: 53
End: 2023-05-11
Payer: MEDICARE

## 2023-05-11 NOTE — TELEPHONE ENCOUNTER
Per chart review, we have not received a cardiac clearance request. The surgical office will need to fax us the surgical info on their letterhead along w/ what they want held.       I called and informed pt of the above, he stated that they just need to know what needs held and are unsure of surgery date. I explained that we do still need a request, but that it can say date undetermined. Pt verbalized understanding.

## 2023-05-11 NOTE — TELEPHONE ENCOUNTER
Caller: Min Landon    Relationship: Self    Best call back number: [phone]    What is the best time to reach you: ANY     Who are you requesting to speak with (clinical staff, provider,  specific staff member): CLINICAL      What was the call regarding: PATIENT CALLED STATING THAT THE [facility] IS WANTING TO KNOW THE UNINTERRUPTED TIMEFRAME THAT HE CAN GO OFF PLAVIX IF THEY CALL HIM FOR A TRANSPLANT.     Do you require a callback: YES

## 2023-07-31 ENCOUNTER — TELEPHONE (OUTPATIENT)
Dept: CARDIOLOGY | Facility: CLINIC | Age: 53
End: 2023-07-31
Payer: MEDICARE

## 2023-08-01 ENCOUNTER — OFFICE VISIT (OUTPATIENT)
Dept: CARDIOLOGY | Facility: CLINIC | Age: 53
End: 2023-08-01
Payer: MEDICARE

## 2023-08-01 VITALS
HEIGHT: 67 IN | SYSTOLIC BLOOD PRESSURE: 121 MMHG | OXYGEN SATURATION: 95 % | WEIGHT: 190 LBS | HEART RATE: 71 BPM | DIASTOLIC BLOOD PRESSURE: 76 MMHG | BODY MASS INDEX: 29.82 KG/M2

## 2023-08-01 DIAGNOSIS — Z95.1 HX OF CABG: ICD-10-CM

## 2023-08-01 DIAGNOSIS — I50.33 ACUTE ON CHRONIC HEART FAILURE WITH PRESERVED EJECTION FRACTION (HFPEF): ICD-10-CM

## 2023-08-01 DIAGNOSIS — I25.708 CORONARY ARTERY DISEASE OF BYPASS GRAFT OF NATIVE HEART WITH STABLE ANGINA PECTORIS: Primary | ICD-10-CM

## 2023-08-01 DIAGNOSIS — I48.0 PAROXYSMAL ATRIAL FIBRILLATION: ICD-10-CM

## 2023-08-01 DIAGNOSIS — I10 BENIGN ESSENTIAL HYPERTENSION: ICD-10-CM

## 2023-08-01 PROCEDURE — 1160F RVW MEDS BY RX/DR IN RCRD: CPT | Performed by: NURSE PRACTITIONER

## 2023-08-01 PROCEDURE — 3074F SYST BP LT 130 MM HG: CPT | Performed by: NURSE PRACTITIONER

## 2023-08-01 PROCEDURE — 99213 OFFICE O/P EST LOW 20 MIN: CPT | Performed by: NURSE PRACTITIONER

## 2023-08-01 PROCEDURE — 3078F DIAST BP <80 MM HG: CPT | Performed by: NURSE PRACTITIONER

## 2023-08-01 PROCEDURE — 1159F MED LIST DOCD IN RCRD: CPT | Performed by: NURSE PRACTITIONER

## 2023-09-08 ENCOUNTER — HOSPITAL ENCOUNTER (OUTPATIENT)
Dept: CARDIOLOGY | Facility: HOSPITAL | Age: 53
Discharge: HOME | End: 2023-09-08
Payer: MEDICARE

## 2023-09-08 VITALS — HEIGHT: 67 IN | WEIGHT: 190.04 LBS | BODY MASS INDEX: 29.83 KG/M2

## 2023-09-08 DIAGNOSIS — I25.708 CORONARY ARTERY DISEASE OF BYPASS GRAFT OF NATIVE HEART WITH STABLE ANGINA PECTORIS: ICD-10-CM

## 2023-09-08 DIAGNOSIS — I48.0 PAROXYSMAL ATRIAL FIBRILLATION: ICD-10-CM

## 2023-09-08 DIAGNOSIS — I10 BENIGN ESSENTIAL HYPERTENSION: ICD-10-CM

## 2023-09-08 DIAGNOSIS — Z95.1 HX OF CABG: ICD-10-CM

## 2023-09-08 DIAGNOSIS — I50.33 ACUTE ON CHRONIC HEART FAILURE WITH PRESERVED EJECTION FRACTION (HFPEF): ICD-10-CM

## 2023-09-08 PROCEDURE — 93308 TTE F-UP OR LMTD: CPT

## 2023-09-11 LAB
BH CV ECHO MEAS - ACS: 1.3 CM
BH CV ECHO MEAS - AO ROOT DIAM: 2.8 CM
BH CV ECHO MEAS - EDV(CUBED): 186.2 ML
BH CV ECHO MEAS - EDV(MOD-SP4): 179 ML
BH CV ECHO MEAS - EF(MOD-BP): 43 %
BH CV ECHO MEAS - EF(MOD-SP4): 44.2 %
BH CV ECHO MEAS - EF_3D-VOL: 45 %
BH CV ECHO MEAS - ESV(CUBED): 90 ML
BH CV ECHO MEAS - ESV(MOD-SP4): 99.8 ML
BH CV ECHO MEAS - FS: 21.5 %
BH CV ECHO MEAS - IVS/LVPW: 0.91 CM
BH CV ECHO MEAS - IVSD: 1.01 CM
BH CV ECHO MEAS - LA DIMENSION: 4.2 CM
BH CV ECHO MEAS - LV DIASTOLIC VOL/BSA (35-75): 90.5 CM2
BH CV ECHO MEAS - LV MASS(C)D: 245.1 GRAMS
BH CV ECHO MEAS - LV SYSTOLIC VOL/BSA (12-30): 50.4 CM2
BH CV ECHO MEAS - LVIDD: 5.7 CM
BH CV ECHO MEAS - LVIDS: 4.5 CM
BH CV ECHO MEAS - LVPWD: 1.11 CM
BH CV ECHO MEAS - SI(MOD-SP4): 40 ML/M2
BH CV ECHO MEAS - SV(MOD-SP4): 79.2 ML

## 2023-09-13 ENCOUNTER — TELEPHONE (OUTPATIENT)
Dept: CARDIOLOGY | Facility: CLINIC | Age: 53
End: 2023-09-13
Payer: MEDICARE

## 2023-09-13 NOTE — TELEPHONE ENCOUNTER
----- Message from RASHAD Garza sent at 9/12/2023 11:06 AM EDT -----  Ejection fraction 35 to 40%.  This is unchanged from previous.    Patient has questions we can bring him in sooner otherwise we can further discuss on follow-up.  With his renal status we are optimized on medication    I called patient and advised that Echo showed EF  of 35-40% I also let him know that if needs sooner apt we could move him up.He stated that Dr Araya did CT of chest and that she wanted JR to review it . I printed it and placed on JR desk with a note for his review.

## 2023-10-19 ENCOUNTER — TELEPHONE (OUTPATIENT)
Dept: CARDIOLOGY | Facility: CLINIC | Age: 53
End: 2023-10-19
Payer: MEDICARE

## 2023-10-19 NOTE — TELEPHONE ENCOUNTER
Patient instructed to have surgeon's office fax a cardiac clearance to our office and then it will be addressed. Verbalized ok. PH,LPN

## 2023-10-19 NOTE — TELEPHONE ENCOUNTER
Name of person calling: MICHAEL JURADO    Surgeon's name: DR. VIVIENNE JIMÉNEZ IN [place]    Type of planned surgery: BACK SURGERY     Date of planned surgery: NOT SET YET     Type of anesthesia: UNSURE     Have you been experiencing chest pain or shortness of breath? NO    Is your doctor requesting for you to stop any of your medications prior to your surgery? PLAVIX 1 WEEK BEFORE AND 2 WEEKS AFTER

## 2023-10-30 ENCOUNTER — TELEPHONE (OUTPATIENT)
Dept: CARDIOLOGY | Facility: CLINIC | Age: 53
End: 2023-10-30
Payer: MEDICARE

## 2023-10-30 NOTE — TELEPHONE ENCOUNTER
Received cardiac clearance request from  stating pt has lumbar fusion to be scheduled and is requiring a cardiac clearance. Placed cardiac clearance request in Josey's inbox to review and address with provider.

## 2023-11-02 ENCOUNTER — TELEPHONE (OUTPATIENT)
Dept: CARDIOLOGY | Facility: CLINIC | Age: 53
End: 2023-11-02
Payer: MEDICARE

## 2023-11-02 NOTE — TELEPHONE ENCOUNTER
Patient called for status of clearance. Dr Cosby has contacted him and will not schedule until approved.

## 2023-11-08 ENCOUNTER — TELEPHONE (OUTPATIENT)
Dept: CARDIOLOGY | Facility: CLINIC | Age: 53
End: 2023-11-08
Payer: MEDICARE

## 2023-11-08 NOTE — TELEPHONE ENCOUNTER
Pt is scheduled for his surgery 12/13/2023 and the CC needs extended please. Please fax it to [phone] Att: Kae    Thank you,    Carlos Zuleta Rep

## 2023-12-07 ENCOUNTER — PRE-ADMISSION TESTING (OUTPATIENT)
Dept: PREADMISSION TESTING | Facility: HOSPITAL | Age: 53
End: 2023-12-07
Payer: MEDICARE

## 2023-12-07 VITALS — BODY MASS INDEX: 30.57 KG/M2 | WEIGHT: 194.78 LBS | HEIGHT: 67 IN

## 2023-12-07 LAB
APTT PPP: 32.2 SECONDS (ref 22–39)
DEPRECATED RDW RBC AUTO: 49.4 FL (ref 37–54)
ERYTHROCYTE [DISTWIDTH] IN BLOOD BY AUTOMATED COUNT: 12.6 % (ref 12.3–15.4)
HBA1C MFR BLD: 5 % (ref 4.8–5.6)
HCT VFR BLD AUTO: 37.1 % (ref 37.5–51)
HGB BLD-MCNC: 11.1 G/DL (ref 13–17.7)
INR PPP: 1.26 (ref 0.89–1.12)
MCH RBC QN AUTO: 32.2 PG (ref 26.6–33)
MCHC RBC AUTO-ENTMCNC: 29.9 G/DL (ref 31.5–35.7)
MCV RBC AUTO: 107.5 FL (ref 79–97)
PLATELET # BLD AUTO: 139 10*3/MM3 (ref 140–450)
PMV BLD AUTO: 9.9 FL (ref 6–12)
POTASSIUM SERPL-SCNC: 5.2 MMOL/L (ref 3.5–5.2)
PROTHROMBIN TIME: 15.9 SECONDS (ref 12.2–14.5)
QT INTERVAL: 406 MS
QTC INTERVAL: 453 MS
RBC # BLD AUTO: 3.45 10*6/MM3 (ref 4.14–5.8)
WBC NRBC COR # BLD AUTO: 5.53 10*3/MM3 (ref 3.4–10.8)

## 2023-12-07 PROCEDURE — 36415 COLL VENOUS BLD VENIPUNCTURE: CPT

## 2023-12-07 PROCEDURE — 85610 PROTHROMBIN TIME: CPT

## 2023-12-07 PROCEDURE — 93005 ELECTROCARDIOGRAM TRACING: CPT

## 2023-12-07 PROCEDURE — 85730 THROMBOPLASTIN TIME PARTIAL: CPT

## 2023-12-07 PROCEDURE — 84132 ASSAY OF SERUM POTASSIUM: CPT

## 2023-12-07 PROCEDURE — 83036 HEMOGLOBIN GLYCOSYLATED A1C: CPT

## 2023-12-07 PROCEDURE — 85027 COMPLETE CBC AUTOMATED: CPT

## 2023-12-07 RX ORDER — ICOSAPENT ETHYL 1000 MG/1
CAPSULE ORAL
COMMUNITY
Start: 2023-09-08

## 2023-12-07 RX ORDER — MELATONIN 10 MG
CAPSULE ORAL
COMMUNITY

## 2023-12-07 RX ORDER — ESCITALOPRAM OXALATE 10 MG/1
1 TABLET, FILM COATED ORAL DAILY
COMMUNITY
Start: 2023-10-04

## 2023-12-07 NOTE — PAT
An arrival time for procedure was not provided during PAT visit. If patient had any questions or concerns about their arrival time, they were instructed to contact their surgeon/physician.  Additionally, if the patient referred to an arrival time that was acquired from their my chart account, patient was encouraged to verify that time with their surgeon/physician. Arrival times are NOT provided in Pre Admission Testing Department.    Patient viewed general PAT education video as instructed in their preoperative information received from their surgeon.  Patient stated the general PAT education video was viewed in its entirety and survey completed.  Copies of PAT general education handouts (Incentive Spirometry, Meds to Beds Program, Patient Belongings, Pre-op skin preparation instructions, Blood Glucose testing, Visitor policy, Surgery FAQ, Code H) distributed to patient if not printed. Education related to the PAT pass and skin preparation for surgery (if applicable) completed in PAT as a reinforcement to PAT education video. Patient instructed to return PAT pass provided today as well as completed skin preparation sheet (if applicable) on the day of procedure.     Additionally if patient had not viewed video yet but intended to view it at home or in our waiting area, then referred them to the handout with QR code/link provided during PAT visit.  Instructed patient to complete survey after viewing the video in its entirety.  Encouraged patient/family to read PAT general education handouts thoroughly and notify PAT staff with any questions or concerns. Patient verbalized understanding of all information and priority content.    Patient instructed to drink 20 ounces of Gatorade or Gatorlyte (if diabetic) and it needs to be completed 1 hour (for Main OR patients) or 2 hours (scheduled  section & [facility] patients) before given arrival time for procedure (NO RED Gatorade and NO Gatorade Zero).    Patient  verbalized understanding.    Patient denies any current skin issues.     Patient to apply Chlorhexadine wipes  to surgical area (as instructed) the night before procedure and the AM of procedure. Wipes provided.    Verified patient previously completed cardiology visit for cardiac risk assessment in preparation for upcoming procedure, completion of 12-lead ECG DONE, and risk assessment letter reviewed. No further interventions required.

## 2023-12-12 ENCOUNTER — ANESTHESIA EVENT (OUTPATIENT)
Dept: PERIOP | Facility: HOSPITAL | Age: 53
End: 2023-12-12
Payer: MEDICARE

## 2023-12-12 RX ORDER — FAMOTIDINE 10 MG/ML
20 INJECTION, SOLUTION INTRAVENOUS ONCE
Status: CANCELLED | OUTPATIENT
Start: 2023-12-12 | End: 2023-12-12

## 2023-12-13 ENCOUNTER — HOSPITAL ENCOUNTER (INPATIENT)
Facility: HOSPITAL | Age: 53
LOS: 2 days | Discharge: HOME | End: 2023-12-15
Attending: ORTHOPAEDIC SURGERY | Admitting: ORTHOPAEDIC SURGERY
Payer: MEDICARE

## 2023-12-13 ENCOUNTER — ANESTHESIA (OUTPATIENT)
Dept: PERIOP | Facility: HOSPITAL | Age: 53
End: 2023-12-13
Payer: MEDICARE

## 2023-12-13 ENCOUNTER — APPOINTMENT (OUTPATIENT)
Dept: GENERAL RADIOLOGY | Facility: HOSPITAL | Age: 53
End: 2023-12-13
Payer: MEDICARE

## 2023-12-13 DIAGNOSIS — R06.02 SOB (SHORTNESS OF BREATH): ICD-10-CM

## 2023-12-13 DIAGNOSIS — R00.2 PALPITATIONS: ICD-10-CM

## 2023-12-13 DIAGNOSIS — R94.39 ABNORMAL STRESS TEST: ICD-10-CM

## 2023-12-13 DIAGNOSIS — I10 ESSENTIAL HYPERTENSION: ICD-10-CM

## 2023-12-13 PROBLEM — M54.9 BACK PAIN: Status: ACTIVE | Noted: 2023-12-13

## 2023-12-13 PROBLEM — Z98.1 S/P LUMBAR FUSION: Status: ACTIVE | Noted: 2023-12-13

## 2023-12-13 PROBLEM — I48.91 A-FIB: Status: ACTIVE | Noted: 2023-12-13

## 2023-12-13 LAB
ALBUMIN SERPL-MCNC: 3.9 G/DL (ref 3.5–5.2)
ALBUMIN/GLOB SERPL: 1.2 G/DL
ALP SERPL-CCNC: 214 U/L (ref 39–117)
ALT SERPL W P-5'-P-CCNC: 63 U/L (ref 1–41)
ANION GAP SERPL CALCULATED.3IONS-SCNC: 13 MMOL/L (ref 5–15)
AST SERPL-CCNC: 106 U/L (ref 1–40)
BILIRUB SERPL-MCNC: 1.1 MG/DL (ref 0–1.2)
BUN SERPL-MCNC: 40 MG/DL (ref 6–20)
BUN/CREAT SERPL: 8.3 (ref 7–25)
CALCIUM SPEC-SCNC: 9.9 MG/DL (ref 8.6–10.5)
CHLORIDE SERPL-SCNC: 94 MMOL/L (ref 98–107)
CO2 SERPL-SCNC: 31 MMOL/L (ref 22–29)
CREAT SERPL-MCNC: 4.8 MG/DL (ref 0.76–1.27)
DEPRECATED RDW RBC AUTO: 45.8 FL (ref 37–54)
EGFRCR SERPLBLD CKD-EPI 2021: 13.7 ML/MIN/1.73
ERYTHROCYTE [DISTWIDTH] IN BLOOD BY AUTOMATED COUNT: 12.5 % (ref 12.3–15.4)
GLOBULIN UR ELPH-MCNC: 3.2 GM/DL
GLUCOSE BLDC GLUCOMTR-MCNC: 100 MG/DL (ref 70–130)
GLUCOSE BLDC GLUCOMTR-MCNC: 118 MG/DL (ref 70–130)
GLUCOSE BLDC GLUCOMTR-MCNC: 183 MG/DL (ref 70–130)
GLUCOSE BLDC GLUCOMTR-MCNC: 83 MG/DL (ref 70–130)
GLUCOSE BLDC GLUCOMTR-MCNC: 85 MG/DL (ref 70–130)
GLUCOSE SERPL-MCNC: 83 MG/DL (ref 65–99)
HCT VFR BLD AUTO: 40.2 % (ref 37.5–51)
HGB BLD-MCNC: 12.8 G/DL (ref 13–17.7)
INR PPP: 1.21 (ref 0.89–1.12)
MCH RBC QN AUTO: 31.6 PG (ref 26.6–33)
MCHC RBC AUTO-ENTMCNC: 31.8 G/DL (ref 31.5–35.7)
MCV RBC AUTO: 99.3 FL (ref 79–97)
PLATELET # BLD AUTO: 169 10*3/MM3 (ref 140–450)
PMV BLD AUTO: 10 FL (ref 6–12)
POTASSIUM SERPL-SCNC: 4.5 MMOL/L (ref 3.5–5.2)
PROT SERPL-MCNC: 7.1 G/DL (ref 6–8.5)
PROTHROMBIN TIME: 15.4 SECONDS (ref 12.2–14.5)
RBC # BLD AUTO: 4.05 10*6/MM3 (ref 4.14–5.8)
SODIUM SERPL-SCNC: 138 MMOL/L (ref 136–145)
WBC NRBC COR # BLD AUTO: 6.22 10*3/MM3 (ref 3.4–10.8)

## 2023-12-13 PROCEDURE — 0SG00AJ FUSION OF LUMBAR VERTEBRAL JOINT WITH INTERBODY FUSION DEVICE, POSTERIOR APPROACH, ANTERIOR COLUMN, OPEN APPROACH: ICD-10-PCS | Performed by: ORTHOPAEDIC SURGERY

## 2023-12-13 PROCEDURE — 25010000002 FENTANYL CITRATE (PF) 50 MCG/ML SOLUTION

## 2023-12-13 PROCEDURE — 0SG00K1 FUSION OF LUMBAR VERTEBRAL JOINT WITH NONAUTOLOGOUS TISSUE SUBSTITUTE, POSTERIOR APPROACH, POSTERIOR COLUMN, OPEN APPROACH: ICD-10-PCS | Performed by: ORTHOPAEDIC SURGERY

## 2023-12-13 PROCEDURE — 63710000001 TACROLIMUS PER 1 MG: Performed by: NURSE PRACTITIONER

## 2023-12-13 PROCEDURE — 25010000002 FENTANYL CITRATE (PF) 100 MCG/2ML SOLUTION: Performed by: ANESTHESIOLOGY

## 2023-12-13 PROCEDURE — 25010000002 HYDROMORPHONE PER 4 MG: Performed by: ANESTHESIOLOGY

## 2023-12-13 PROCEDURE — 85610 PROTHROMBIN TIME: CPT | Performed by: ANESTHESIOLOGY

## 2023-12-13 PROCEDURE — C1713 ANCHOR/SCREW BN/BN,TIS/BN: HCPCS | Performed by: ORTHOPAEDIC SURGERY

## 2023-12-13 PROCEDURE — 25010000002 PHENYLEPHRINE 10 MG/ML SOLUTION: Performed by: ANESTHESIOLOGY

## 2023-12-13 PROCEDURE — 76000 FLUOROSCOPY <1 HR PHYS/QHP: CPT

## 2023-12-13 PROCEDURE — 25010000002 PHENYLEPHRINE 10 MG/ML SOLUTION 1 ML VIAL: Performed by: ANESTHESIOLOGY

## 2023-12-13 PROCEDURE — 0SB20ZZ EXCISION OF LUMBAR VERTEBRAL DISC, OPEN APPROACH: ICD-10-PCS | Performed by: ORTHOPAEDIC SURGERY

## 2023-12-13 PROCEDURE — 0QP104Z REMOVAL OF INTERNAL FIXATION DEVICE FROM SACRUM, OPEN APPROACH: ICD-10-PCS | Performed by: ORTHOPAEDIC SURGERY

## 2023-12-13 PROCEDURE — 63710000001 PREDNISONE PER 5 MG: Performed by: NURSE PRACTITIONER

## 2023-12-13 PROCEDURE — 25010000002 CEFAZOLIN PER 500 MG: Performed by: ORTHOPAEDIC SURGERY

## 2023-12-13 PROCEDURE — 25010000002 ONDANSETRON PER 1 MG: Performed by: ANESTHESIOLOGY

## 2023-12-13 PROCEDURE — 25010000002 ALBUMIN HUMAN 5% PER 50 ML

## 2023-12-13 PROCEDURE — 25810000003 SODIUM CHLORIDE 0.9 % SOLUTION: Performed by: ANESTHESIOLOGY

## 2023-12-13 PROCEDURE — P9041 ALBUMIN (HUMAN),5%, 50ML: HCPCS

## 2023-12-13 PROCEDURE — 0QP004Z REMOVAL OF INTERNAL FIXATION DEVICE FROM LUMBAR VERTEBRA, OPEN APPROACH: ICD-10-PCS | Performed by: ORTHOPAEDIC SURGERY

## 2023-12-13 PROCEDURE — 82948 REAGENT STRIP/BLOOD GLUCOSE: CPT

## 2023-12-13 PROCEDURE — 25010000002 SUGAMMADEX 200 MG/2ML SOLUTION: Performed by: ANESTHESIOLOGY

## 2023-12-13 PROCEDURE — 25010000002 HYDROMORPHONE 1 MG/ML SOLUTION

## 2023-12-13 PROCEDURE — 85027 COMPLETE CBC AUTOMATED: CPT | Performed by: ANESTHESIOLOGY

## 2023-12-13 PROCEDURE — 80053 COMPREHEN METABOLIC PANEL: CPT | Performed by: ANESTHESIOLOGY

## 2023-12-13 PROCEDURE — 01NB0ZZ RELEASE LUMBAR NERVE, OPEN APPROACH: ICD-10-PCS | Performed by: ORTHOPAEDIC SURGERY

## 2023-12-13 PROCEDURE — 63710000001 INSULIN DETEMIR PER 5 UNITS: Performed by: NURSE PRACTITIONER

## 2023-12-13 PROCEDURE — 25810000003 SODIUM CHLORIDE 0.9 % SOLUTION 250 ML FLEX CONT: Performed by: ANESTHESIOLOGY

## 2023-12-13 PROCEDURE — 25010000002 PROPOFOL 10 MG/ML EMULSION: Performed by: ANESTHESIOLOGY

## 2023-12-13 DEVICE — HEMOST ABS SURGIFOAM SZ100 8X12 10MM: Type: IMPLANTABLE DEVICE | Site: SPINE LUMBAR | Status: FUNCTIONAL

## 2023-12-13 DEVICE — CONFIDENCE SPINAL CEMENT SYSTEM CONFIDENCE PLUS KIT SPINAL CEMENT SYSTEM 11CC
Type: IMPLANTABLE DEVICE | Site: SPINE LUMBAR | Status: FUNCTIONAL
Brand: CONFIDENCE SPINAL CEMENT SYSTEM

## 2023-12-13 DEVICE — IMPLANTABLE DEVICE: Type: IMPLANTABLE DEVICE | Site: SPINE LUMBAR | Status: FUNCTIONAL

## 2023-12-13 DEVICE — ROD PRELRD SPINE EXPEDIUM W/LINE 65MM: Type: IMPLANTABLE DEVICE | Site: SPINE LUMBAR | Status: FUNCTIONAL

## 2023-12-13 DEVICE — FLOSEAL HEMOSTATIC MATRIX, 10ML
Type: IMPLANTABLE DEVICE | Site: SPINE LUMBAR | Status: FUNCTIONAL
Brand: FLOSEAL HEMOSTATIC MATRIX

## 2023-12-13 DEVICE — CMT BONE CONFIDENCE HI VISC: Type: IMPLANTABLE DEVICE | Site: SPINE LUMBAR | Status: FUNCTIONAL

## 2023-12-13 DEVICE — SCRW VIPER INNR ST: Type: IMPLANTABLE DEVICE | Site: SPINE LUMBAR | Status: FUNCTIONAL

## 2023-12-13 DEVICE — DELIVERY SYSTEM CANNULA
Type: IMPLANTABLE DEVICE | Site: SPINE LUMBAR | Status: FUNCTIONAL
Brand: VIVIGEN MIS DELIVERY SYSTEM

## 2023-12-13 DEVICE — ALLOGRFT BONE VIVIGEN CELLUAR MATRX FORMABLE 5CC: Type: IMPLANTABLE DEVICE | Site: SPINE LUMBAR | Status: FUNCTIONAL

## 2023-12-13 DEVICE — SPACR OPAL REVOLVE 9X28X10MM: Type: IMPLANTABLE DEVICE | Site: SPINE LUMBAR | Status: FUNCTIONAL

## 2023-12-13 RX ORDER — CYCLOBENZAPRINE HCL 10 MG
10 TABLET ORAL 3 TIMES DAILY PRN
Status: DISCONTINUED | OUTPATIENT
Start: 2023-12-13 | End: 2023-12-15 | Stop reason: HOSPADM

## 2023-12-13 RX ORDER — SODIUM CHLORIDE 0.9 % (FLUSH) 0.9 %
10 SYRINGE (ML) INJECTION AS NEEDED
Status: DISCONTINUED | OUTPATIENT
Start: 2023-12-13 | End: 2023-12-13 | Stop reason: HOSPADM

## 2023-12-13 RX ORDER — SODIUM CHLORIDE 9 MG/ML
40 INJECTION, SOLUTION INTRAVENOUS AS NEEDED
Status: DISCONTINUED | OUTPATIENT
Start: 2023-12-13 | End: 2023-12-15 | Stop reason: HOSPADM

## 2023-12-13 RX ORDER — L.ACID,PARA/B.BIFIDUM/S.THERM 8B CELL
1 CAPSULE ORAL DAILY
Status: DISCONTINUED | OUTPATIENT
Start: 2023-12-13 | End: 2023-12-15 | Stop reason: HOSPADM

## 2023-12-13 RX ORDER — SODIUM CHLORIDE 0.9 % (FLUSH) 0.9 %
3 SYRINGE (ML) INJECTION EVERY 12 HOURS SCHEDULED
Status: DISCONTINUED | OUTPATIENT
Start: 2023-12-13 | End: 2023-12-15 | Stop reason: HOSPADM

## 2023-12-13 RX ORDER — NICOTINE POLACRILEX 4 MG
15 LOZENGE BUCCAL
Status: DISCONTINUED | OUTPATIENT
Start: 2023-12-13 | End: 2023-12-15 | Stop reason: HOSPADM

## 2023-12-13 RX ORDER — ONDANSETRON 2 MG/ML
4 INJECTION INTRAMUSCULAR; INTRAVENOUS EVERY 6 HOURS PRN
Status: DISCONTINUED | OUTPATIENT
Start: 2023-12-13 | End: 2023-12-15 | Stop reason: HOSPADM

## 2023-12-13 RX ORDER — BISACODYL 10 MG
10 SUPPOSITORY, RECTAL RECTAL DAILY PRN
Status: DISCONTINUED | OUTPATIENT
Start: 2023-12-13 | End: 2023-12-15 | Stop reason: HOSPADM

## 2023-12-13 RX ORDER — CHOLECALCIFEROL (VITAMIN D3) 125 MCG
10 CAPSULE ORAL NIGHTLY PRN
Status: DISCONTINUED | OUTPATIENT
Start: 2023-12-13 | End: 2023-12-15 | Stop reason: HOSPADM

## 2023-12-13 RX ORDER — LABETALOL HYDROCHLORIDE 5 MG/ML
10 INJECTION, SOLUTION INTRAVENOUS EVERY 4 HOURS PRN
Status: DISCONTINUED | OUTPATIENT
Start: 2023-12-13 | End: 2023-12-15 | Stop reason: HOSPADM

## 2023-12-13 RX ORDER — METHOCARBAMOL 500 MG/1
1000 TABLET, FILM COATED ORAL 4 TIMES DAILY PRN
Status: DISCONTINUED | OUTPATIENT
Start: 2023-12-13 | End: 2023-12-15 | Stop reason: HOSPADM

## 2023-12-13 RX ORDER — HYDROMORPHONE HYDROCHLORIDE 1 MG/ML
0.5 INJECTION, SOLUTION INTRAMUSCULAR; INTRAVENOUS; SUBCUTANEOUS
Status: DISCONTINUED | OUTPATIENT
Start: 2023-12-13 | End: 2023-12-13 | Stop reason: HOSPADM

## 2023-12-13 RX ORDER — ALBUMIN, HUMAN INJ 5% 5 %
SOLUTION INTRAVENOUS
Status: COMPLETED
Start: 2023-12-13 | End: 2023-12-13

## 2023-12-13 RX ORDER — ACETAMINOPHEN 325 MG/1
650 TABLET ORAL EVERY 4 HOURS PRN
Status: DISCONTINUED | OUTPATIENT
Start: 2023-12-13 | End: 2023-12-15 | Stop reason: HOSPADM

## 2023-12-13 RX ORDER — ROCURONIUM BROMIDE 10 MG/ML
INJECTION, SOLUTION INTRAVENOUS AS NEEDED
Status: DISCONTINUED | OUTPATIENT
Start: 2023-12-13 | End: 2023-12-13 | Stop reason: SURG

## 2023-12-13 RX ORDER — SODIUM CHLORIDE AND POTASSIUM CHLORIDE 150; 900 MG/100ML; MG/100ML
100 INJECTION, SOLUTION INTRAVENOUS CONTINUOUS
Status: DISCONTINUED | OUTPATIENT
Start: 2023-12-13 | End: 2023-12-15 | Stop reason: HOSPADM

## 2023-12-13 RX ORDER — SODIUM CHLORIDE 9 MG/ML
9 INJECTION, SOLUTION INTRAVENOUS CONTINUOUS
Status: DISCONTINUED | OUTPATIENT
Start: 2023-12-13 | End: 2023-12-15 | Stop reason: HOSPADM

## 2023-12-13 RX ORDER — SODIUM CHLORIDE 0.9 % (FLUSH) 0.9 %
10 SYRINGE (ML) INJECTION EVERY 12 HOURS SCHEDULED
Status: DISCONTINUED | OUTPATIENT
Start: 2023-12-13 | End: 2023-12-13 | Stop reason: HOSPADM

## 2023-12-13 RX ORDER — ONDANSETRON 2 MG/ML
4 INJECTION INTRAMUSCULAR; INTRAVENOUS ONCE AS NEEDED
Status: DISCONTINUED | OUTPATIENT
Start: 2023-12-13 | End: 2023-12-13 | Stop reason: HOSPADM

## 2023-12-13 RX ORDER — PREGABALIN 75 MG/1
75 CAPSULE ORAL ONCE
Status: COMPLETED | OUTPATIENT
Start: 2023-12-13 | End: 2023-12-13

## 2023-12-13 RX ORDER — PROPOFOL 10 MG/ML
VIAL (ML) INTRAVENOUS AS NEEDED
Status: DISCONTINUED | OUTPATIENT
Start: 2023-12-13 | End: 2023-12-13 | Stop reason: SURG

## 2023-12-13 RX ORDER — OXYCODONE HCL 10 MG/1
10 TABLET, FILM COATED, EXTENDED RELEASE ORAL ONCE
Status: COMPLETED | OUTPATIENT
Start: 2023-12-13 | End: 2023-12-13

## 2023-12-13 RX ORDER — FAMOTIDINE 20 MG/1
20 TABLET, FILM COATED ORAL ONCE
Status: COMPLETED | OUTPATIENT
Start: 2023-12-13 | End: 2023-12-13

## 2023-12-13 RX ORDER — HYDROMORPHONE HYDROCHLORIDE 2 MG/ML
INJECTION, SOLUTION INTRAMUSCULAR; INTRAVENOUS; SUBCUTANEOUS AS NEEDED
Status: DISCONTINUED | OUTPATIENT
Start: 2023-12-13 | End: 2023-12-13 | Stop reason: SURG

## 2023-12-13 RX ORDER — INSULIN LISPRO 100 [IU]/ML
2-7 INJECTION, SOLUTION INTRAVENOUS; SUBCUTANEOUS
Status: DISCONTINUED | OUTPATIENT
Start: 2023-12-13 | End: 2023-12-15 | Stop reason: HOSPADM

## 2023-12-13 RX ORDER — SEVELAMER CARBONATE 800 MG/1
1600 TABLET, FILM COATED ORAL
Status: DISCONTINUED | OUTPATIENT
Start: 2023-12-13 | End: 2023-12-15 | Stop reason: HOSPADM

## 2023-12-13 RX ORDER — MAGNESIUM HYDROXIDE 1200 MG/15ML
LIQUID ORAL AS NEEDED
Status: DISCONTINUED | OUTPATIENT
Start: 2023-12-13 | End: 2023-12-13 | Stop reason: HOSPADM

## 2023-12-13 RX ORDER — ONDANSETRON 2 MG/ML
4 INJECTION INTRAMUSCULAR; INTRAVENOUS EVERY 6 HOURS PRN
Status: DISCONTINUED | OUTPATIENT
Start: 2023-12-13 | End: 2023-12-13 | Stop reason: SDUPTHER

## 2023-12-13 RX ORDER — ACETAMINOPHEN 500 MG
1000 TABLET ORAL ONCE
Status: COMPLETED | OUTPATIENT
Start: 2023-12-13 | End: 2023-12-13

## 2023-12-13 RX ORDER — LABETALOL HYDROCHLORIDE 5 MG/ML
10 INJECTION, SOLUTION INTRAVENOUS EVERY 4 HOURS PRN
Status: DISCONTINUED | OUTPATIENT
Start: 2023-12-13 | End: 2023-12-13 | Stop reason: SDUPTHER

## 2023-12-13 RX ORDER — PREDNISONE 5 MG/1
5 TABLET ORAL EVERY OTHER DAY
Status: DISCONTINUED | OUTPATIENT
Start: 2023-12-13 | End: 2023-12-15 | Stop reason: HOSPADM

## 2023-12-13 RX ORDER — EPHEDRINE SULFATE 50 MG/ML
INJECTION, SOLUTION INTRAVENOUS AS NEEDED
Status: DISCONTINUED | OUTPATIENT
Start: 2023-12-13 | End: 2023-12-13 | Stop reason: SURG

## 2023-12-13 RX ORDER — SODIUM CHLORIDE, SODIUM LACTATE, POTASSIUM CHLORIDE, CALCIUM CHLORIDE 600; 310; 30; 20 MG/100ML; MG/100ML; MG/100ML; MG/100ML
9 INJECTION, SOLUTION INTRAVENOUS CONTINUOUS
Status: DISCONTINUED | OUTPATIENT
Start: 2023-12-13 | End: 2023-12-13

## 2023-12-13 RX ORDER — ROSUVASTATIN CALCIUM 20 MG/1
20 TABLET, COATED ORAL NIGHTLY
Status: DISCONTINUED | OUTPATIENT
Start: 2023-12-13 | End: 2023-12-15 | Stop reason: HOSPADM

## 2023-12-13 RX ORDER — OXYCODONE AND ACETAMINOPHEN 10; 325 MG/1; MG/1
1 TABLET ORAL EVERY 4 HOURS PRN
Status: DISCONTINUED | OUTPATIENT
Start: 2023-12-13 | End: 2023-12-15 | Stop reason: HOSPADM

## 2023-12-13 RX ORDER — NALOXONE HCL 0.4 MG/ML
0.4 VIAL (ML) INJECTION
Status: DISCONTINUED | OUTPATIENT
Start: 2023-12-13 | End: 2023-12-15 | Stop reason: HOSPADM

## 2023-12-13 RX ORDER — FENTANYL CITRATE 50 UG/ML
50 INJECTION, SOLUTION INTRAMUSCULAR; INTRAVENOUS
Status: DISCONTINUED | OUTPATIENT
Start: 2023-12-13 | End: 2023-12-13 | Stop reason: HOSPADM

## 2023-12-13 RX ORDER — FENTANYL CITRATE 50 UG/ML
INJECTION, SOLUTION INTRAMUSCULAR; INTRAVENOUS
Status: COMPLETED
Start: 2023-12-13 | End: 2023-12-13

## 2023-12-13 RX ORDER — LIDOCAINE HYDROCHLORIDE 10 MG/ML
INJECTION, SOLUTION EPIDURAL; INFILTRATION; INTRACAUDAL; PERINEURAL AS NEEDED
Status: DISCONTINUED | OUTPATIENT
Start: 2023-12-13 | End: 2023-12-13 | Stop reason: SURG

## 2023-12-13 RX ORDER — SODIUM CHLORIDE 0.9 % (FLUSH) 0.9 %
3-10 SYRINGE (ML) INJECTION AS NEEDED
Status: DISCONTINUED | OUTPATIENT
Start: 2023-12-13 | End: 2023-12-15 | Stop reason: HOSPADM

## 2023-12-13 RX ORDER — FENTANYL CITRATE 50 UG/ML
INJECTION, SOLUTION INTRAMUSCULAR; INTRAVENOUS AS NEEDED
Status: DISCONTINUED | OUTPATIENT
Start: 2023-12-13 | End: 2023-12-13 | Stop reason: SURG

## 2023-12-13 RX ORDER — DEXTROSE MONOHYDRATE 25 G/50ML
25 INJECTION, SOLUTION INTRAVENOUS
Status: DISCONTINUED | OUTPATIENT
Start: 2023-12-13 | End: 2023-12-15 | Stop reason: HOSPADM

## 2023-12-13 RX ORDER — OXYCODONE HYDROCHLORIDE 5 MG/1
5 TABLET ORAL EVERY 4 HOURS PRN
Status: DISCONTINUED | OUTPATIENT
Start: 2023-12-13 | End: 2023-12-15 | Stop reason: HOSPADM

## 2023-12-13 RX ORDER — ONDANSETRON 2 MG/ML
INJECTION INTRAMUSCULAR; INTRAVENOUS AS NEEDED
Status: DISCONTINUED | OUTPATIENT
Start: 2023-12-13 | End: 2023-12-13 | Stop reason: SURG

## 2023-12-13 RX ORDER — TACROLIMUS 0.5 MG/1
0.5 CAPSULE ORAL 2 TIMES DAILY
Status: DISCONTINUED | OUTPATIENT
Start: 2023-12-13 | End: 2023-12-15 | Stop reason: HOSPADM

## 2023-12-13 RX ORDER — PANTOPRAZOLE SODIUM 40 MG/1
40 TABLET, DELAYED RELEASE ORAL DAILY
Status: DISCONTINUED | OUTPATIENT
Start: 2023-12-14 | End: 2023-12-15 | Stop reason: HOSPADM

## 2023-12-13 RX ORDER — MORPHINE SULFATE 2 MG/ML
1 INJECTION, SOLUTION INTRAMUSCULAR; INTRAVENOUS EVERY 4 HOURS PRN
Status: DISCONTINUED | OUTPATIENT
Start: 2023-12-13 | End: 2023-12-15 | Stop reason: HOSPADM

## 2023-12-13 RX ORDER — BUPIVACAINE HYDROCHLORIDE AND EPINEPHRINE 2.5; 5 MG/ML; UG/ML
INJECTION, SOLUTION INFILTRATION; PERINEURAL AS NEEDED
Status: DISCONTINUED | OUTPATIENT
Start: 2023-12-13 | End: 2023-12-13 | Stop reason: HOSPADM

## 2023-12-13 RX ORDER — PHENYLEPHRINE HYDROCHLORIDE 10 MG/ML
INJECTION INTRAVENOUS AS NEEDED
Status: DISCONTINUED | OUTPATIENT
Start: 2023-12-13 | End: 2023-12-13 | Stop reason: SURG

## 2023-12-13 RX ORDER — ALBUMIN, HUMAN INJ 5% 5 %
12.5 SOLUTION INTRAVENOUS ONCE
Status: COMPLETED | OUTPATIENT
Start: 2023-12-13 | End: 2023-12-13

## 2023-12-13 RX ORDER — SODIUM CHLORIDE 9 MG/ML
40 INJECTION, SOLUTION INTRAVENOUS AS NEEDED
Status: DISCONTINUED | OUTPATIENT
Start: 2023-12-13 | End: 2023-12-13 | Stop reason: HOSPADM

## 2023-12-13 RX ORDER — BISACODYL 5 MG/1
5 TABLET, DELAYED RELEASE ORAL DAILY PRN
Status: DISCONTINUED | OUTPATIENT
Start: 2023-12-13 | End: 2023-12-15 | Stop reason: HOSPADM

## 2023-12-13 RX ORDER — MIDAZOLAM HYDROCHLORIDE 1 MG/ML
1 INJECTION INTRAMUSCULAR; INTRAVENOUS
Status: DISCONTINUED | OUTPATIENT
Start: 2023-12-13 | End: 2023-12-13 | Stop reason: HOSPADM

## 2023-12-13 RX ORDER — LIDOCAINE HYDROCHLORIDE 10 MG/ML
0.5 INJECTION, SOLUTION EPIDURAL; INFILTRATION; INTRACAUDAL; PERINEURAL ONCE AS NEEDED
Status: COMPLETED | OUTPATIENT
Start: 2023-12-13 | End: 2023-12-13

## 2023-12-13 RX ORDER — ESCITALOPRAM OXALATE 10 MG/1
10 TABLET ORAL DAILY
Status: DISCONTINUED | OUTPATIENT
Start: 2023-12-13 | End: 2023-12-15 | Stop reason: HOSPADM

## 2023-12-13 RX ORDER — IBUPROFEN 600 MG/1
1 TABLET ORAL
Status: DISCONTINUED | OUTPATIENT
Start: 2023-12-13 | End: 2023-12-15 | Stop reason: HOSPADM

## 2023-12-13 RX ADMIN — FENTANYL CITRATE 50 MCG: 50 INJECTION, SOLUTION INTRAMUSCULAR; INTRAVENOUS at 11:45

## 2023-12-13 RX ADMIN — ONDANSETRON 4 MG: 2 INJECTION INTRAMUSCULAR; INTRAVENOUS at 15:23

## 2023-12-13 RX ADMIN — PHENYLEPHRINE HYDROCHLORIDE 200 MCG: 10 INJECTION INTRAVENOUS at 12:40

## 2023-12-13 RX ADMIN — FENTANYL CITRATE 50 MCG: 50 INJECTION, SOLUTION INTRAMUSCULAR; INTRAVENOUS at 16:38

## 2023-12-13 RX ADMIN — PHENYLEPHRINE HYDROCHLORIDE 200 MCG: 10 INJECTION INTRAVENOUS at 11:54

## 2023-12-13 RX ADMIN — PREDNISONE 5 MG: 5 TABLET ORAL at 23:33

## 2023-12-13 RX ADMIN — SODIUM CHLORIDE 2000 MG: 900 INJECTION INTRAVENOUS at 11:48

## 2023-12-13 RX ADMIN — HYDROMORPHONE HYDROCHLORIDE 0.5 MG: 1 INJECTION, SOLUTION INTRAMUSCULAR; INTRAVENOUS; SUBCUTANEOUS at 16:26

## 2023-12-13 RX ADMIN — PHENYLEPHRINE HYDROCHLORIDE 200 MCG: 10 INJECTION INTRAVENOUS at 12:36

## 2023-12-13 RX ADMIN — SODIUM CHLORIDE 2000 MG: 900 INJECTION INTRAVENOUS at 15:11

## 2023-12-13 RX ADMIN — LIDOCAINE HYDROCHLORIDE 0.5 ML: 10 INJECTION, SOLUTION EPIDURAL; INFILTRATION; INTRACAUDAL; PERINEURAL at 10:08

## 2023-12-13 RX ADMIN — PHENYLEPHRINE HYDROCHLORIDE 1 MCG/KG/MIN: 10 INJECTION INTRAVENOUS at 13:02

## 2023-12-13 RX ADMIN — LIDOCAINE HYDROCHLORIDE 50 MG: 10 INJECTION, SOLUTION EPIDURAL; INFILTRATION; INTRACAUDAL; PERINEURAL at 11:45

## 2023-12-13 RX ADMIN — EPHEDRINE SULFATE 10 MG: 50 INJECTION INTRAVENOUS at 14:18

## 2023-12-13 RX ADMIN — OXYCODONE HYDROCHLORIDE 10 MG: 10 TABLET, FILM COATED, EXTENDED RELEASE ORAL at 10:14

## 2023-12-13 RX ADMIN — ALBUMIN (HUMAN) 12.5 G: 12.5 INJECTION, SOLUTION INTRAVENOUS at 17:12

## 2023-12-13 RX ADMIN — EPHEDRINE SULFATE 5 MG: 50 INJECTION INTRAVENOUS at 13:31

## 2023-12-13 RX ADMIN — ROSUVASTATIN 20 MG: 20 TABLET, FILM COATED ORAL at 23:33

## 2023-12-13 RX ADMIN — HYDROMORPHONE HYDROCHLORIDE 0.5 MG: 1 INJECTION, SOLUTION INTRAMUSCULAR; INTRAVENOUS; SUBCUTANEOUS at 16:12

## 2023-12-13 RX ADMIN — ROCURONIUM BROMIDE 50 MG: 10 SOLUTION INTRAVENOUS at 11:45

## 2023-12-13 RX ADMIN — HYDROMORPHONE HYDROCHLORIDE 0.4 MG: 2 INJECTION, SOLUTION INTRAMUSCULAR; INTRAVENOUS; SUBCUTANEOUS at 15:23

## 2023-12-13 RX ADMIN — ESCITALOPRAM OXALATE 10 MG: 10 TABLET ORAL at 23:33

## 2023-12-13 RX ADMIN — SODIUM CHLORIDE 2000 MG: 900 INJECTION INTRAVENOUS at 23:39

## 2023-12-13 RX ADMIN — Medication 3 ML: at 23:33

## 2023-12-13 RX ADMIN — FENTANYL CITRATE 50 MCG: 50 INJECTION, SOLUTION INTRAMUSCULAR; INTRAVENOUS at 12:20

## 2023-12-13 RX ADMIN — ALBUMIN, HUMAN INJ 5% 12.5 G: 5 SOLUTION at 17:12

## 2023-12-13 RX ADMIN — SUGAMMADEX 150 MG: 100 INJECTION, SOLUTION INTRAVENOUS at 15:43

## 2023-12-13 RX ADMIN — EPHEDRINE SULFATE 5 MG: 50 INJECTION INTRAVENOUS at 13:36

## 2023-12-13 RX ADMIN — PREGABALIN 75 MG: 75 CAPSULE ORAL at 10:14

## 2023-12-13 RX ADMIN — PHENYLEPHRINE HYDROCHLORIDE 200 MCG: 10 INJECTION INTRAVENOUS at 11:45

## 2023-12-13 RX ADMIN — PHENYLEPHRINE HYDROCHLORIDE 200 MCG: 10 INJECTION INTRAVENOUS at 12:56

## 2023-12-13 RX ADMIN — TACROLIMUS 0.5 MG: 0.5 CAPSULE ORAL at 23:31

## 2023-12-13 RX ADMIN — ACETAMINOPHEN 1000 MG: 500 TABLET ORAL at 10:14

## 2023-12-13 RX ADMIN — INSULIN DETEMIR 16 UNITS: 100 INJECTION, SOLUTION SUBCUTANEOUS at 23:34

## 2023-12-13 RX ADMIN — SODIUM CHLORIDE 9 ML/HR: 9 INJECTION, SOLUTION INTRAVENOUS at 10:08

## 2023-12-13 RX ADMIN — FAMOTIDINE 20 MG: 20 TABLET ORAL at 10:14

## 2023-12-13 RX ADMIN — Medication 1 CAPSULE: at 23:32

## 2023-12-13 RX ADMIN — PHENYLEPHRINE HYDROCHLORIDE 200 MCG: 10 INJECTION INTRAVENOUS at 12:09

## 2023-12-13 RX ADMIN — OXYCODONE HYDROCHLORIDE AND ACETAMINOPHEN 1 TABLET: 10; 325 TABLET ORAL at 23:29

## 2023-12-13 RX ADMIN — HYDROMORPHONE HYDROCHLORIDE 0.6 MG: 2 INJECTION, SOLUTION INTRAMUSCULAR; INTRAVENOUS; SUBCUTANEOUS at 12:20

## 2023-12-13 RX ADMIN — EPHEDRINE SULFATE 5 MG: 50 INJECTION INTRAVENOUS at 13:30

## 2023-12-13 RX ADMIN — PROPOFOL 150 MG: 10 INJECTION, EMULSION INTRAVENOUS at 11:45

## 2023-12-13 NOTE — H&P
Pre-Op H&P  Min Landon  [id number]  1970      Chief complaint: Back pain      Subjective:  Patient is a 53 y.o.male presents for scheduled surgery by Dr. Graves. He anticipates a LUMBAR FUSION L4-5 today.  The patient endorses having back pain since he was 18 years old.  He also had his first fusion at the age of 18.  His back pain is intermittent, and is relieved by medications and rest.  He denies having any other associated symptoms with his present condition.      + cardiac clearance in chart from 11/11/2023.  The last dose of Plavix was on 12/6/2023.    Review of Systems:  Constitutional-- No fever, chills or sweats. No fatigue.  CV-- No chest pain, palpitation or syncope  Resp-- No SOB, cough, hemoptysis  Skin--No rashes or lesions      Allergies:   Allergies   Allergen Reactions    Bactrim [Sulfamethoxazole-Trimethoprim] Nausea And Vomiting         Home Meds:  Medications Prior to Admission   Medication Sig Dispense Refill Last Dose    aspirin 81 MG EC tablet Take 1 tablet by mouth Daily.   12/12/2023 at 0900    clopidogrel (PLAVIX) 75 MG tablet TAKE 1 TABLET BY MOUTH DAILY 90 tablet 0 12/6/2023    fenofibrate 160 MG tablet Take 1 tablet by mouth Daily.   12/12/2023    insulin glargine (LANTUS, SEMGLEE) 100 UNIT/ML injection Inject 16 Units under the skin into the appropriate area as directed Daily.   12/12/2023    Insulin Lispro (humaLOG) 100 UNIT/ML injection Inject  under the skin into the appropriate area as directed 3 (Three) Times a Day Before Meals. Sliding scale   12/12/2023    Lexapro 10 MG tablet Take 1 tablet by mouth Daily.   12/12/2023    Melatonin 10 MG capsule Take  by mouth.   12/12/2023    multivitamin (THERAGRAN) tablet tablet Take 1 tablet by mouth Daily.   12/12/2023    O2 (OXYGEN) Inhale 2 L/min Every Night.   12/13/2023    oxyCODONE-acetaminophen (PERCOCET) 5-325 MG per tablet As Needed.   12/12/2023    pantoprazole (PROTONIX) 40 MG EC tablet Take 1 tablet by mouth  Daily.   12/12/2023    predniSONE (DELTASONE) 5 MG tablet Take 1 tablet by mouth Every Other Day.   Past Week    Probiotic Product (PROBIOTIC BLEND PO) Take  by mouth.   12/12/2023    Renvela 800 MG tablet Take 1 tablet by mouth 3 (Three) Times a Day. 2 tabs po w/ every meal   12/12/2023    rosuvastatin (CRESTOR) 20 MG tablet TAKE 1 TABLET BY MOUTH DAILY 90 tablet 3 12/12/2023    tacrolimus (PROGRAF) 1 MG capsule Take 0.5 mg by mouth 2 (Two) Times a Day.   12/12/2023    Vascepa 1 g capsule capsule    12/12/2023         PMH:   Past Medical History:   Diagnosis Date    A-fib     Arthritis     Calciphylaxis     Cancer     SKIN CANCER    CHF (congestive heart failure)     Chronic kidney disease     Coronary artery disease of bypass graft of native heart with stable angina pectoris 04/19/2022    Diabetes mellitus     Elevated cholesterol     End stage renal disease     M/W/F DIALYSIS    GERD (gastroesophageal reflux disease)     History of transfusion     SELF DONATED BLOOD IN 2005 AFTER BACK SURGERY AT [facility]    Hyperlipidemia     Hypertension     HISTORY OF NO LONGER REQUIRING MEDICATIONS DUE TO DIALYSIS    Lung injury     LUNG SCARING FROM COVID-19    Sleep apnea 08/09/2022    UNABLE TO TOLERATE WEARS 2L NC     PSH:    Past Surgical History:   Procedure Laterality Date    ARTERIOVENOUS FISTULA      BACK SURGERY      LUMBAR X3    CARDIAC CATHETERIZATION      CARPAL TUNNEL RELEASE Left     CATARACT EXTRACTION, BILATERAL      LENS IMPLANTS    COLONOSCOPY      CORONARY ARTERY BYPASS GRAFT      JANUARY 2023    FEMUR HARDWARE REMOVAL      GALLBLADDER SURGERY      HIP SURGERY Right     PLATE IN    INSERTION AND REMOVAL PERITONEAL DIALYSIS CATHETER      SUPRAPUBIC CATHETER INSERTION      AND REMOVAL    THORACENTESIS      TRANSPLANTATION RENAL Right 2014    TUNNELED VENOUS CATHETER PLACEMENT      VASECTOMY         Immunization History:  Influenza: 2023  Pneumococcal: No  Tetanus: Yes  Covid : x2    Social History:   Tobacco:  "  Social History     Tobacco Use   Smoking Status Never    Passive exposure: Past   Smokeless Tobacco Former      Alcohol:     Social History     Substance and Sexual Activity   Alcohol Use Never         Physical Exam:/92 (BP Location: Right arm, Patient Position: Lying)   Pulse 76   Temp 97.4 °F (36.3 °C) (Temporal)   Resp 18   Ht 170.2 cm (67\")   Wt 83.9 kg (185 lb)   SpO2 96%   BMI 28.98 kg/m²       General Appearance:    Alert, cooperative, no distress, appears stated age   Head:    Normocephalic, without obvious abnormality, atraumatic   Lungs:     Clear to auscultation bilaterally, respirations unlabored    Heart:   Regular rate and rhythm, S1 and S2 normal    Abdomen:    Soft without tenderness   Extremities:   Extremities normal, atraumatic, no cyanosis or edema   Skin:   Skin color, texture, turgor normal, no rashes or lesions   Neurologic:   Grossly intact     Results Review:     LABS:  Lab Results   Component Value Date    WBC 5.53 12/07/2023    HGB 11.1 (L) 12/07/2023    HCT 37.1 (L) 12/07/2023    .5 (H) 12/07/2023     (L) 12/07/2023    NEUTROABS 5,728 10/18/2022    GLUCOSE 179 (H) 01/12/2023    BUN 55 (H) 07/06/2021    CREATININE 3.68 (H) 07/06/2021    EGFRIFNONA 18 (L) 07/06/2021    EGFRIFAFRI 9 01/03/2023     01/10/2023    K 5.2 12/07/2023    CL 98 01/12/2023    CO2 25 07/06/2021    MG 2.6 (H) 01/16/2023    CALCIUM 8.7 (L) 07/06/2021    ALBUMIN 3.9 10/18/2022    AST 26 10/18/2022    ALT 18 10/18/2022    BILITOT 0.6 10/18/2022       RADIOLOGY:  Imaging Results (Last 72 Hours)       ** No results found for the last 72 hours. **            I reviewed the patient's new clinical results.    Cancer Staging (if applicable)  Cancer Patient: _x_ yes __no __unknown; If yes, clinical stage T:__ N:__M:__, stage group or __N/A      Impression: Low back pain      Plan: LUMBAR FUSION L4-5       Baudilio Hale, APRN   12/13/2023   10:26 EST  "

## 2023-12-13 NOTE — ANESTHESIA PROCEDURE NOTES
Airway  Urgency: elective    Date/Time: 12/13/2023 11:47 AM  Airway not difficult    General Information and Staff    Patient location during procedure: OR  CRNA/CAA: Schirmer, Shelley P, CRNA    Indications and Patient Condition  Indications for airway management: airway protection    Preoxygenated: yes  MILS not maintained throughout  Mask difficulty assessment: 1 - vent by mask    Final Airway Details  Final airway type: endotracheal airway      Successful airway: ETT  Cuffed: yes   Successful intubation technique: direct laryngoscopy  Endotracheal tube insertion site: oral  Blade: Antoinette  Blade size: 4  ETT size (mm): 7.5  Cormack-Lehane Classification: grade IIa - partial view of glottis  Placement verified by: chest auscultation and capnometry   Measured from: lips  ETT/EBT  to lips (cm): 20  Number of attempts at approach: 1  Assessment: lips, teeth, and gum same as pre-op and atraumatic intubation    Additional Comments  Negative epigastric sounds, Breath sound equal bilaterally with symmetric chest rise and fall

## 2023-12-13 NOTE — ANESTHESIA PREPROCEDURE EVALUATION
Anesthesia Evaluation     Patient summary reviewed and Nursing notes reviewed   no history of anesthetic complications:   NPO Solid Status: > 8 hours  NPO Liquid Status: > 2 hours           Airway   Mallampati: I  TM distance: >3 FB  Neck ROM: full  No difficulty expected  Dental - normal exam     Pulmonary - normal exam   (+) ,sleep apnea  Cardiovascular     ECG reviewed  PT is on anticoagulation therapy    (+) hypertension, CAD, dysrhythmias Atrial Fib, angina, CHF Systolic <55%, hyperlipidemia  (-) cardiac stents    ROS comment: Recent ECHO:  Technically limited study.  1.  The left ventricle is dilated and global LV systolic function is significantly depressed.  Visually estimated ejection fraction is 35±5 %.  3D ejection fraction is 45%.  The distal septum appears dyskinetic.  Mild concentric left ventricular hypertrophy.  2.  There is mitral annular calcification as well as sclerosis of both mitral leaflets.  The aortic valve appears grossly morphologically normal.  Right-sided heart valves are unremarkable.  3.  No pericardial or great vessel pathology.      Neuro/Psych  (-) seizures  GI/Hepatic/Renal/Endo    (+) GERD, renal disease- ESRD and dialysis, diabetes mellitus    Musculoskeletal     Abdominal    Substance History      OB/GYN          Other   arthritis,chronic steroid use                    Anesthesia Plan    ASA 4     general     (Awaiting labs prior to proceeding to OR. )  intravenous induction     Anesthetic plan, risks, benefits, and alternatives have been provided, discussed and informed consent has been obtained with: patient.    Use of blood products discussed with patient  Consented to blood products.    Plan discussed with CRNA.      CODE STATUS:          [No Acute Distress] : no acute distress [Normal Appearance] : normal appearance [No Neck Mass] : no neck mass [Normal Rate/Rhythm] : normal rate/rhythm [Normal S1, S2] : normal s1, s2 [No Resp Distress] : no resp distress [Clear to Auscultation Bilaterally] : clear to auscultation bilaterally [No Abnormalities] : no abnormalities [Benign] : benign [No Clubbing] : no clubbing [No Edema] : no edema [Normal Affect] : normal affect [TextBox_[id number]] : + Dupuytren's contracture of right 4th digit

## 2023-12-13 NOTE — OP NOTE
PREOPERATIVE DIAGNOSES:  L4-L5 spondylolisthesis.   Adjacent segment disease with spinal stenosis at L4-L5.   Status post L5-S1 fusion.   Renal failure.     POSTOPERATIVE DIAGNOSES:  L4-L5 spondylolisthesis.   Adjacent segment disease with spinal stenosis at L4-L5.  Status post L5-S1 fusion.   Renal failure.      PROCEDURES PERFORMED:   Decompressive laminectomy, facetectomy and foraminotomies L4, L5 to decompress neural elements.   Segmental instrumentation L4-L5-S1 with DePuy transpedicular fixation.   Transforaminal lumbar interbody fusion L4-L5 with DePuy/Synthes interbody fusion cage and bone graft.   Posterolateral fusion L4-L5 with bone graft.   Removal of hardware L5-S1 and inspection of fusion. Fusion noted to be solid.   Harvesting of bone graft locally from spinous processes and lamina.   Use of Vivigen allograft.     SURGEON: Star Crabtree MD     ASSISTANT: KESHA Castorena (Mr. Olson's assistance as required for patient positioning, surgical approach, hardware removal, instrumentation, neural decompression, and wound closure).     ANESTHESIA: General.    ESTIMATED BLOOD LOSS: 300 mL.     OPERATIVE COURSE: The patient was brought to the operating room. He was given a preoperative dose of intravenous Ancef. He was given a general anesthetic. He was placed in the prone position.     Back was prepped and draped sterilely. A  midline incision was made. I carried the incision down to fascia. Fascia was split. Scar tissue and paraspinous musculature was elevated. I identified the previous hardware at L5-S1. The hardware was encased in bone. Using an osteotome, this overlying bone was removed. A total of 4 bone screws and 2 rods were removed from L5-S1. The fusion was noted to be solid. It was one solid contiguous unit.     Instrumentation was performed. Under C-arm guidance, I placed pedicle screws bilaterally at L4, L5, and S1. The patient had somewhat soft bone likely secondary to his chronic renal  failure. Screws all appeared in good position under C-Arm. Two rods contoured into lordosis were placed in the screws. Prior to placing the rods, I augmented the screw fixation with bone cement because of the soft bone. [employer] bone cement was injected to the screws at L4 and L5. Then 2 rods contoured into lordosis were placed in the screws and the appropriate set screws applied.     Neural decompression was performed. Preoperatively there was noted to be a severe stenosis at L4-L5 secondary to disc bulging, facet hypertrophy, and ligamentum flavum hypertrophy. A complete laminectomy was performed of L4. There was a severe central stenosis. Ligamentum flavum was removed. Bilateral medial fasciectomies and foraminotomies were performed. The neural elements appeared completely decompressed. The dural sac was widely expanded at this point.     Transforaminal lumbar interbody fusion was then performed at L4-L5.  This was done to the right side. An annular window was created between the exiting nerve root above and traversing nerve root medially. Disc was extremely degenerate. There was little disc material remaining. There was a large vacuum disc phenomenon noticeable on C-arm. All the degenerative disc was removed. Using trials, the appropriate size cage was 28 in length and 10 mm in height. This cage was packed with bone graft. Prior to placing the cage, I copiously irrigated the disc space. I packed bone graft into the anterior disc space. The cage was packed with bone graft. The cage was then impacted tangentially across the disc space. It locked into place well. I then applied compression across the screws to lock the cage in place. The construct appeared solid.     A posterolateral fusion was then performed. The transverse processes of L4 and previous fusion mass at L5-S1 were decorticated. Bone graft was packed in the posterolateral gutters on the left and right sides of L4-L5.     It should be noted, bone graft  was prepared from locally harvested bone. Bone from the spinous processes and lamina was morselized. This material was then mixed Vivigen allograft. The above material was used for the fusion bone.     Prior to bone grafting, final copious irrigation was undertaken with Irrisept and then normal saline. Final torque tightening was done of the all the screws. Final C-arm images showed all hardware in good position. The exposed dura was covered with some Floseal and thrombin-soaked Gelfoam. A deep Hemovac drain was placed and brought out through a separate stab incision.     The wound was closed in layers. It was closed with Vicryl and skin staples. A sterile dressing was applied. Patient was awakened and transported to the recovery room in stable condition.

## 2023-12-13 NOTE — H&P
Patient Name: Min Landon  MRN: [id number]  : 1970  DOS: 2023    Attending: Den Graves MD    Primary Care Provider: Diego Roberson MD      Chief complaint: Back pain    Subjective   Patient is a pleasant 53 y.o. male presented for scheduled surgery by Dr. Graves.  He underwent lumbar fusion under general anesthesia.  He tolerated surgery well and was admitted for further medical management.    When seen in PACU he is sedated with oral airway in place. He appears comfortable and in stable condition.    Allergies:  Allergies   Allergen Reactions    Bactrim [Sulfamethoxazole-Trimethoprim] Nausea And Vomiting       Meds:  Medications Prior to Admission   Medication Sig Dispense Refill Last Dose    aspirin 81 MG EC tablet Take 1 tablet by mouth Daily.   2023 at 0900    clopidogrel (PLAVIX) 75 MG tablet TAKE 1 TABLET BY MOUTH DAILY 90 tablet 0 2023    fenofibrate 160 MG tablet Take 1 tablet by mouth Daily.   2023    insulin glargine (LANTUS, SEMGLEE) 100 UNIT/ML injection Inject 16 Units under the skin into the appropriate area as directed Daily.   2023    Insulin Lispro (humaLOG) 100 UNIT/ML injection Inject  under the skin into the appropriate area as directed 3 (Three) Times a Day Before Meals. Sliding scale   2023    Lexapro 10 MG tablet Take 1 tablet by mouth Daily.   2023    Melatonin 10 MG capsule Take  by mouth.   2023    multivitamin (THERAGRAN) tablet tablet Take 1 tablet by mouth Daily.   2023    O2 (OXYGEN) Inhale 2 L/min Every Night.   2023    oxyCODONE-acetaminophen (PERCOCET) 5-325 MG per tablet As Needed.   2023    pantoprazole (PROTONIX) 40 MG EC tablet Take 1 tablet by mouth Daily.   2023    predniSONE (DELTASONE) 5 MG tablet Take 1 tablet by mouth Every Other Day.   Past Week    Probiotic Product (PROBIOTIC BLEND PO) Take  by mouth.   2023    Renvela 800 MG tablet Take 1 tablet by mouth 3 (Three)  Times a Day. 2 tabs po w/ every meal   12/12/2023    rosuvastatin (CRESTOR) 20 MG tablet TAKE 1 TABLET BY MOUTH DAILY 90 tablet 3 12/12/2023    tacrolimus (PROGRAF) 1 MG capsule Take 0.5 mg by mouth 2 (Two) Times a Day.   12/12/2023    Vascepa 1 g capsule capsule    12/12/2023         History:   Past Medical History:   Diagnosis Date    A-fib     Arthritis     Calciphylaxis     Cancer     SKIN CANCER    CHF (congestive heart failure)     Chronic kidney disease     Coronary artery disease of bypass graft of native heart with stable angina pectoris 04/19/2022    Diabetes mellitus     Elevated cholesterol     End stage renal disease     M/W/F DIALYSIS    GERD (gastroesophageal reflux disease)     History of transfusion     SELF DONATED BLOOD IN 2005 AFTER BACK SURGERY AT [facility]    Hyperlipidemia     Hypertension     HISTORY OF NO LONGER REQUIRING MEDICATIONS DUE TO DIALYSIS    Lung injury     LUNG SCARING FROM COVID-19    Sleep apnea 08/09/2022    UNABLE TO TOLERATE WEARS 2L NC     Past Surgical History:   Procedure Laterality Date    ARTERIOVENOUS FISTULA      BACK SURGERY      LUMBAR X3    CARDIAC CATHETERIZATION      CARPAL TUNNEL RELEASE Left     CATARACT EXTRACTION, BILATERAL      LENS IMPLANTS    COLONOSCOPY      CORONARY ARTERY BYPASS GRAFT      JANUARY 2023    FEMUR HARDWARE REMOVAL      GALLBLADDER SURGERY      HIP SURGERY Right     PLATE IN    INSERTION AND REMOVAL PERITONEAL DIALYSIS CATHETER      SUPRAPUBIC CATHETER INSERTION      AND REMOVAL    THORACENTESIS      TRANSPLANTATION RENAL Right 2014    TUNNELED VENOUS CATHETER PLACEMENT      VASECTOMY       Family History   Problem Relation Age of Onset    Sudden death Mother     Hypertension Father     Sudden death Father     Heart disease Father         UNKNOWN    Heart attack Father         UNKNOWN     Social History     Tobacco Use    Smoking status: Never     Passive exposure: Past    Smokeless tobacco: Former   Vaping Use    Vaping Use: Never used  "  Substance Use Topics    Alcohol use: Never    Drug use: Never       Review of Systems  Review of systems could not be obtained due to   patient sedation status.    Vital Signs  /92 (BP Location: Right arm, Patient Position: Lying)   Pulse 76   Temp 97.4 °F (36.3 °C) (Temporal)   Resp 18   Ht 170.2 cm (67\")   Wt 83.9 kg (185 lb)   SpO2 99%   BMI 28.98 kg/m²     Physical Exam:    General Appearance:  Sedated, in no acute distress   Head:    Normocephalic, without obvious abnormality, atraumatic   Ears:    Ears appear intact with no abnormalities noted   Back: Surgical dressing CDI.  Hemovac present   Lungs:     Clear to auscultation,respirations regular, even and unlabored    Heart:    Regular rhythm and normal rate, normal S1 and S2, no murmur, no gallop   Abdomen:     Normal bowel sounds, no masses, no organomegaly, soft non-tender, non-distended, no guarding, no rebound  tenderness   Genitalia:    Deferred   Extremities:   no edema, no cyanosis, no  redness   Pulses:   Pulses palpable and equal bilaterally   Skin:   No bleeding, bruising or rash      I reviewed the patient's new clinical results.       Results from last 7 days   Lab Units 12/13/23  0954 12/07/23  1326   WBC 10*3/mm3 6.22 5.53   HEMOGLOBIN g/dL 12.8* 11.1*   HEMATOCRIT % 40.2 37.1*   PLATELETS 10*3/mm3 169 139*     Results from last 7 days   Lab Units 12/13/23  0954 12/07/23  1326   SODIUM mmol/L 138  --    POTASSIUM mmol/L 4.5 5.2   CHLORIDE mmol/L 94*  --    CO2 mmol/L 31.0*  --    BUN mg/dL 40*  --    CREATININE mg/dL 4.80*  --    CALCIUM mg/dL 9.9  --    BILIRUBIN mg/dL 1.1  --    ALK PHOS U/L 214*  --    ALT (SGPT) U/L 63*  --    AST (SGOT) U/L 106*  --    GLUCOSE mg/dL 83  --      Lab Results   Component Value Date    HGBA1C 5.00 12/07/2023         Assessment and Plan:     S/P lumbar fusion    Back pain    Benign essential hypertension    Type 1 diabetes    End-stage renal disease    HLD (hyperlipidemia)    Coronary artery " disease of bypass graft of native heart with stable angina pectoris    A-fib      Plan  1. PT-ambulate  2. Pain control-prns   3. IS-encourage  4. DVT proph- Mechs  5. Bowel regimen  6. Resume home medications as appropriate  7. Monitor post-op labs  8. DC planning for home    DM  - hgb A1c on 12/7/23 7.0  - Continue long-acting insulin  - Accu-Chek AC and HS with low dose SSI    HTN, Hyperlipidemia, CAD  - Continue home statin  - Resume Plavix when okay with Dr. Graves  - Monitor BP   - Holding parameters for BP meds  - Labetalol PRN for SBP>170    Renal failure  -Consult nephrology for HD orders      Dora Urrutia, RASHAD  12/13/23  15:59 EST

## 2023-12-13 NOTE — OP NOTE
Dictation on: 12/13/2023  3:43 PM by: VIVIENNE CLEMENS [[id number]]      failure. Screws all appeared in good position under C-Arm. Two rods contoured into lordosis were placed in the screws. Prior to placing the rods, I augmented the screw fixation with bone cement because of the soft bone. [employer] bone cement was injected to the screws at L4 and L5. Then 2 rods contoured into lordosis were placed in the screws and the appropriate set screws applied.     Neural decompression was performed. Preoperatively there was noted to be a severe stenosis at L4-L5 secondary to disc bulging, facet hypertrophy, and ligamentum flavum hypertrophy. A complete laminectomy was performed of L4. There was a severe central stenosis. Ligamentum flavum was removed. Bilateral medial fasciectomies and foraminotomies were performed. The neural elements appeared completely decompressed. The dural sac was widely expanded at this point.     Transforaminal lumbar interbody fusion was then performed at L4-L5.  This was done to the right side. An annular window was created between the exiting nerve root above and traversing nerve root medially. Disc was extremely degenerate. There was little disc material remaining. There was a large vacuum disc phenomenon noticeable on C-arm. All the degenerative disc was removed. Using trials, the appropriate size cage was 28 in length and 10 mm in height. This cage was packed with bone graft. Prior to placing the cage, I copiously irrigated the disc space. I packed bone graft into the anterior disc space. The cage was packed with bone graft. The cage was then impacted tangentially across the disc space. It locked into place well. I then applied compression across the screws to lock the cage in place. The construct appeared solid.     A posterolateral fusion was then performed. The transverse processes of L4 and previous fusion mass at L5-S1 were decorticated. Bone graft was packed in the posterolateral gutters on the left and right sides of L4-L5.     It should be noted, bone graft  was prepared from locally harvested bone. Bone from the spinous processes and lamina was morselized. This material was then mixed Vivigen allograft. The above material was used for the fusion bone.     Prior to bone grafting, final copious irrigation was undertaken with Irrisept and then normal saline. Final torque tightening was done of the all the screws. Final C-arm images showed all hardware in good position. The exposed dura was covered with some Floseal and thrombin-soaked Gelfoam. A deep Hemovac drain was placed and brought out through a separate stab incision.     The wound was closed in layers. It was closed with Vicryl and skin staples. A sterile dressing was applied. Patient was awakened and transported to the recovery room in stable condition.

## 2023-12-14 ENCOUNTER — APPOINTMENT (OUTPATIENT)
Dept: NEPHROLOGY | Facility: HOSPITAL | Age: 53
End: 2023-12-14
Payer: MEDICARE

## 2023-12-14 LAB
ANION GAP SERPL CALCULATED.3IONS-SCNC: 13 MMOL/L (ref 5–15)
BUN SERPL-MCNC: 52 MG/DL (ref 6–20)
BUN/CREAT SERPL: 8.4 (ref 7–25)
CALCIUM SPEC-SCNC: 9 MG/DL (ref 8.6–10.5)
CHLORIDE SERPL-SCNC: 96 MMOL/L (ref 98–107)
CO2 SERPL-SCNC: 27 MMOL/L (ref 22–29)
CREAT SERPL-MCNC: 6.16 MG/DL (ref 0.76–1.27)
DEPRECATED RDW RBC AUTO: 50.3 FL (ref 37–54)
EGFRCR SERPLBLD CKD-EPI 2021: 10.2 ML/MIN/1.73
ERYTHROCYTE [DISTWIDTH] IN BLOOD BY AUTOMATED COUNT: 13.2 % (ref 12.3–15.4)
GLUCOSE BLDC GLUCOMTR-MCNC: 162 MG/DL (ref 70–130)
GLUCOSE BLDC GLUCOMTR-MCNC: 168 MG/DL (ref 70–130)
GLUCOSE BLDC GLUCOMTR-MCNC: 170 MG/DL (ref 70–130)
GLUCOSE BLDC GLUCOMTR-MCNC: 214 MG/DL (ref 70–130)
GLUCOSE SERPL-MCNC: 158 MG/DL (ref 65–99)
HAV IGM SERPL QL IA: NORMAL
HBV CORE IGM SERPL QL IA: NORMAL
HBV SURFACE AG SERPL QL IA: NORMAL
HCT VFR BLD AUTO: 29.3 % (ref 37.5–51)
HCV AB SER DONR QL: NORMAL
HGB BLD-MCNC: 9 G/DL (ref 13–17.7)
MCH RBC QN AUTO: 31.7 PG (ref 26.6–33)
MCHC RBC AUTO-ENTMCNC: 30.7 G/DL (ref 31.5–35.7)
MCV RBC AUTO: 103.2 FL (ref 79–97)
PLATELET # BLD AUTO: 162 10*3/MM3 (ref 140–450)
PMV BLD AUTO: 9.9 FL (ref 6–12)
POTASSIUM SERPL-SCNC: 5.4 MMOL/L (ref 3.5–5.2)
RBC # BLD AUTO: 2.84 10*6/MM3 (ref 4.14–5.8)
SODIUM SERPL-SCNC: 136 MMOL/L (ref 136–145)
WBC NRBC COR # BLD AUTO: 8.77 10*3/MM3 (ref 3.4–10.8)

## 2023-12-14 PROCEDURE — 97116 GAIT TRAINING THERAPY: CPT

## 2023-12-14 PROCEDURE — 97161 PT EVAL LOW COMPLEX 20 MIN: CPT

## 2023-12-14 PROCEDURE — 80074 ACUTE HEPATITIS PANEL: CPT | Performed by: INTERNAL MEDICINE

## 2023-12-14 PROCEDURE — 5A1D70Z PERFORMANCE OF URINARY FILTRATION, INTERMITTENT, LESS THAN 6 HOURS PER DAY: ICD-10-PCS | Performed by: INTERNAL MEDICINE

## 2023-12-14 PROCEDURE — 25010000002 HYDROMORPHONE 1 MG/ML SOLUTION: Performed by: ORTHOPAEDIC SURGERY

## 2023-12-14 PROCEDURE — 80048 BASIC METABOLIC PNL TOTAL CA: CPT | Performed by: NURSE PRACTITIONER

## 2023-12-14 PROCEDURE — 85027 COMPLETE CBC AUTOMATED: CPT | Performed by: NURSE PRACTITIONER

## 2023-12-14 PROCEDURE — 63710000001 INSULIN DETEMIR PER 5 UNITS: Performed by: NURSE PRACTITIONER

## 2023-12-14 PROCEDURE — 25010000002 CEFAZOLIN PER 500 MG: Performed by: ORTHOPAEDIC SURGERY

## 2023-12-14 PROCEDURE — 82948 REAGENT STRIP/BLOOD GLUCOSE: CPT

## 2023-12-14 PROCEDURE — 63710000001 TACROLIMUS PER 1 MG: Performed by: NURSE PRACTITIONER

## 2023-12-14 PROCEDURE — 63710000001 INSULIN LISPRO (HUMAN) PER 5 UNITS: Performed by: NURSE PRACTITIONER

## 2023-12-14 RX ORDER — ALBUMIN (HUMAN) 12.5 G/50ML
12.5 SOLUTION INTRAVENOUS AS NEEDED
Status: CANCELLED | OUTPATIENT
Start: 2023-12-15 | End: 2023-12-15

## 2023-12-14 RX ORDER — ALBUMIN (HUMAN) 12.5 G/50ML
12.5 SOLUTION INTRAVENOUS AS NEEDED
Status: DISCONTINUED | OUTPATIENT
Start: 2023-12-14 | End: 2023-12-15 | Stop reason: HOSPADM

## 2023-12-14 RX ADMIN — INSULIN DETEMIR 16 UNITS: 100 INJECTION, SOLUTION SUBCUTANEOUS at 21:16

## 2023-12-14 RX ADMIN — SEVELAMER CARBONATE 1600 MG: 800 TABLET, FILM COATED ORAL at 07:58

## 2023-12-14 RX ADMIN — TACROLIMUS 0.5 MG: 0.5 CAPSULE ORAL at 21:19

## 2023-12-14 RX ADMIN — OXYCODONE HYDROCHLORIDE AND ACETAMINOPHEN 1 TABLET: 10; 325 TABLET ORAL at 14:04

## 2023-12-14 RX ADMIN — INSULIN LISPRO 2 UNITS: 100 INJECTION, SOLUTION INTRAVENOUS; SUBCUTANEOUS at 07:52

## 2023-12-14 RX ADMIN — ESCITALOPRAM OXALATE 10 MG: 10 TABLET ORAL at 14:04

## 2023-12-14 RX ADMIN — INSULIN LISPRO 2 UNITS: 100 INJECTION, SOLUTION INTRAVENOUS; SUBCUTANEOUS at 21:16

## 2023-12-14 RX ADMIN — SEVELAMER CARBONATE 1600 MG: 800 TABLET, FILM COATED ORAL at 14:04

## 2023-12-14 RX ADMIN — INSULIN LISPRO 2 UNITS: 100 INJECTION, SOLUTION INTRAVENOUS; SUBCUTANEOUS at 14:03

## 2023-12-14 RX ADMIN — OXYCODONE HYDROCHLORIDE 5 MG: 5 TABLET ORAL at 07:52

## 2023-12-14 RX ADMIN — Medication 3 ML: at 21:19

## 2023-12-14 RX ADMIN — CYCLOBENZAPRINE 10 MG: 10 TABLET, FILM COATED ORAL at 07:52

## 2023-12-14 RX ADMIN — INSULIN LISPRO 4 UNITS: 100 INJECTION, SOLUTION INTRAVENOUS; SUBCUTANEOUS at 16:50

## 2023-12-14 RX ADMIN — SODIUM CHLORIDE 2000 MG: 900 INJECTION INTRAVENOUS at 05:57

## 2023-12-14 RX ADMIN — PANTOPRAZOLE SODIUM 40 MG: 40 TABLET, DELAYED RELEASE ORAL at 14:03

## 2023-12-14 RX ADMIN — HYDROMORPHONE HYDROCHLORIDE 1 MG: 1 INJECTION, SOLUTION INTRAMUSCULAR; INTRAVENOUS; SUBCUTANEOUS at 21:12

## 2023-12-14 RX ADMIN — Medication 1 CAPSULE: at 14:04

## 2023-12-14 RX ADMIN — OXYCODONE HYDROCHLORIDE AND ACETAMINOPHEN 1 TABLET: 10; 325 TABLET ORAL at 19:15

## 2023-12-14 RX ADMIN — TACROLIMUS 0.5 MG: 0.5 CAPSULE ORAL at 14:03

## 2023-12-14 RX ADMIN — Medication 3 ML: at 08:05

## 2023-12-14 RX ADMIN — SEVELAMER CARBONATE 1600 MG: 800 TABLET, FILM COATED ORAL at 16:50

## 2023-12-14 RX ADMIN — ROSUVASTATIN 20 MG: 20 TABLET, FILM COATED ORAL at 21:19

## 2023-12-14 NOTE — PLAN OF CARE
Problem: Device-Related Complication Risk (Hemodialysis)  Goal: Safe, Effective Therapy Delivery  Outcome: Ongoing, Progressing  Intervention: Optimize Device Care and Function  Recent Flowsheet Documentation  Taken 12/14/2023 1320 by Tamie Joshi, RN  Medication Review/Management:   medications reviewed   high-risk medications identified  Taken 12/14/2023 1045 by Tamie Joshi, RN  Medication Review/Management:   medications reviewed   high-risk medications identified     Problem: Hemodynamic Instability (Hemodialysis)  Goal: Effective Tissue Perfusion  Outcome: Ongoing, Progressing     Problem: Infection (Hemodialysis)  Goal: Absence of Infection Signs and Symptoms  Outcome: Ongoing, Progressing   Goal Outcome Evaluation:         HD complete, blood reinfused, report given to primary MAURIZIO Paul

## 2023-12-14 NOTE — PLAN OF CARE
Goal Outcome Evaluation:  Plan of Care Reviewed With: patient, spouse        Progress: no change  Outcome Evaluation: Pt amb in [place] with FWW and CGA. No LOB noted. Activity limited by fatigue. HEP hand out given and spinal precautions reviewed. Frequent ambulation enecouraged. Recommend d/c home with assist when medically appropriate.      Anticipated Discharge Disposition (PT): home with assist

## 2023-12-14 NOTE — PLAN OF CARE
Problem: Adult Inpatient Plan of Care  Goal: Plan of Care Review  Outcome: Ongoing, Progressing  Goal: Patient-Specific Goal (Individualized)  Outcome: Ongoing, Progressing  Goal: Absence of Hospital-Acquired Illness or Injury  Outcome: Ongoing, Progressing  Intervention: Identify and Manage Fall Risk  Recent Flowsheet Documentation  Taken 12/13/2023 2200 by Jim Grant RN  Safety Promotion/Fall Prevention:   activity supervised   clutter free environment maintained  Intervention: Prevent Skin Injury  Recent Flowsheet Documentation  Taken 12/13/2023 2200 by Jim Grant RN  Body Position: position changed independently  Skin Protection: adhesive use limited  Intervention: Prevent and Manage VTE (Venous Thromboembolism) Risk  Recent Flowsheet Documentation  Taken 12/13/2023 2200 by Jim Grant RN  Activity Management: activity encouraged  VTE Prevention/Management:   bilateral   sequential compression devices on  Range of Motion: active ROM (range of motion) encouraged  Intervention: Prevent Infection  Recent Flowsheet Documentation  Taken 12/13/2023 2200 by Jim Grant RN  Infection Prevention: environmental surveillance performed  Goal: Optimal Comfort and Wellbeing  Outcome: Ongoing, Progressing  Intervention: Provide Person-Centered Care  Recent Flowsheet Documentation  Taken 12/13/2023 2200 by Jim Grant RN  Trust Relationship/Rapport:   care explained   thoughts/feelings acknowledged  Goal: Readiness for Transition of Care  Outcome: Ongoing, Progressing     Problem: Bleeding (Spinal Surgery)  Goal: Absence of Bleeding  Outcome: Ongoing, Progressing     Problem: Bowel Motility Impaired (Spinal Surgery)  Goal: Effective Bowel Elimination  Outcome: Ongoing, Progressing     Problem: Fluid and Electrolyte Imbalance (Spinal Surgery)  Goal: Fluid and Electrolyte Balance  Outcome: Ongoing, Progressing     Problem: Functional Ability Impaired (Spinal Surgery)  Goal: Optimal Functional Ability  Outcome:  Ongoing, Progressing  Intervention: Optimize Functional Status  Recent Flowsheet Documentation  Taken 12/13/2023 2200 by Jim Grant RN  Activity Management: activity encouraged  Positioning/Transfer Devices:   in use   pillows     Problem: Infection (Spinal Surgery)  Goal: Absence of Infection Signs and Symptoms  Outcome: Ongoing, Progressing  Intervention: Prevent or Manage Infection  Recent Flowsheet Documentation  Taken 12/13/2023 2200 by Jim Grant RN  Infection Prevention: environmental surveillance performed     Problem: Neurologic Impairment (Spinal Surgery)  Goal: Optimal Neurologic Function  Outcome: Ongoing, Progressing  Intervention: Optimize Neurologic Function  Recent Flowsheet Documentation  Taken 12/13/2023 2200 by Jim Grant RN  Body Position: position changed independently  Range of Motion: active ROM (range of motion) encouraged     Problem: Ongoing Anesthesia Effects (Spinal Surgery)  Goal: Anesthesia/Sedation Recovery  Outcome: Ongoing, Progressing  Intervention: Optimize Anesthesia Recovery  Recent Flowsheet Documentation  Taken 12/13/2023 2200 by Jim Grant RN  Safety Promotion/Fall Prevention:   activity supervised   clutter free environment maintained  Administration (IS): self-administered     Problem: Pain (Spinal Surgery)  Goal: Acceptable Pain Control  Outcome: Ongoing, Progressing  Intervention: Prevent or Manage Pain  Recent Flowsheet Documentation  Taken 12/13/2023 2200 by Jim Grant RN  Diversional Activities: television     Problem: Postoperative Nausea and Vomiting (Spinal Surgery)  Goal: Nausea and Vomiting Relief  Outcome: Ongoing, Progressing     Problem: Postoperative Urinary Retention (Spinal Surgery)  Goal: Effective Urinary Elimination  Outcome: Ongoing, Progressing  Intervention: Monitor and Manage Urinary Retention  Recent Flowsheet Documentation  Taken 12/13/2023 2200 by Jim Grant RN  Urinary Elimination Promotion: catheter patency maintained      Problem: Respiratory Compromise (Spinal Surgery)  Goal: Effective Oxygenation and Ventilation  Outcome: Ongoing, Progressing  Intervention: Optimize Oxygenation and Ventilation  Recent Flowsheet Documentation  Taken 12/13/2023 2200 by Jim Grant RN  Head of Bed (HOB) Positioning: HOB elevated     Problem: Diabetes Comorbidity  Goal: Blood Glucose Level Within Targeted Range  Outcome: Ongoing, Progressing  Intervention: Monitor and Manage Glycemia  Recent Flowsheet Documentation  Taken 12/13/2023 2200 by Jim Grant RN  Glycemic Management: blood glucose monitored     Problem: Heart Failure Comorbidity  Goal: Maintenance of Heart Failure Symptom Control  Outcome: Ongoing, Progressing     Problem: Hypertension Comorbidity  Goal: Blood Pressure in Desired Range  Outcome: Ongoing, Progressing   Goal Outcome Evaluation:

## 2023-12-14 NOTE — CASE MANAGEMENT/SOCIAL WORK
"Continued Stay Note  [facility]     Patient Name: Min Landon  MRN: [id number]  Today's Date: 12/14/2023    Admit Date: 12/13/2023    Plan: Home with wife's assistance, HD at [facility] in [place], and staple removal at PCP office   Discharge Plan       Row Name 12/14/23 1547       Plan    Plan Home with wife's assistance, HD at [facility] in [place], and staple removal at PCP office    Patient/Family in Agreement with Plan yes    Plan Comments Met with Mr. Landon's wife, Yasmeen, at the bedside, for discharge planning.  Mr. Landon was off the floor for HD at time of CM visit.    The patient lives with his wife in [facility].    Prior to admission, Mr. Landon ambulated with the use of a rollator.  He also has home Oxygen at 2L per [place] [facility], and also uses at HD.  She does not know the O2 provider's name.  Yasmeen denies any additional DME needs.    The patient was evaluated by PT and per notes, \"Pt amb in [place] with FWW and CGA. No LOB noted. Activity limited by fatigue. HEP hand out given and spinal precautions reviewed. Frequent ambulation enecouraged. Recommend d/c home with assist when medically appropriate.\"    Mrs. Landon declines any home health needs.    CM received referral for staple removal from Dr. Cosby.  Contacted the patient's PCP office, Dr. Diego Roberson, ph [phone], fax [phone], and spoke to aYsmeen.  Yasmeen discussed eagle with Dr. Roberson and he agreed to remove patient's staples in 3 weeks.  Faxed orders and clinical to PCP's office.    Mr. Landon goes to HD at [facility] in [place], on MWF, chair time is at 6am.  Insurance is [employer] Medicare with no interruption in coverage.  No ACP documents.    DC plan is home with his wife's assistance.    CM will continue to follow.    Final Discharge Disposition Code 01 - home or self-care                                  Expected Discharge Date and Time       Expected Discharge Date Expected Discharge Time    Dec 14, 2023               Sharmaine FLORES" MAURIZIO [place]

## 2023-12-14 NOTE — CONSULTS
Patient Care Team:  Diego Roberson MD as PCP - General (Family Medicine)    Chief complaint: ESRD    History of Present Illness Mr Landon is a 52 yo gentleman with hx of ESRD previously on PD 3 yrs later had DDKT for 7 yrs. 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 Patient was admitted yesterday for spinal surgery. He underwent Lumbar fusion with NSY yesterday. [place] [facility] [place] In anticipation of surgery he underwent HD on tue. [place] [facility] [place] he is under care of Dr Samaniego in [facility].    Review of Systems   Constitutional: Negative.    HENT: Negative.     Respiratory: Negative.     Cardiovascular: Negative.    Gastrointestinal: Negative.    Genitourinary: Negative.    Musculoskeletal: Negative.    Skin: Negative.    Hematological: Negative.    Psychiatric/Behavioral: Negative.          Past Medical History:   Diagnosis Date    A-fib     Arthritis     Calciphylaxis     Cancer     SKIN CANCER    CHF (congestive heart failure)     Chronic kidney disease     Coronary artery disease of bypass graft of native heart with stable angina pectoris 04/19/2022    Diabetes mellitus     Elevated cholesterol     End stage renal disease     M/W/F DIALYSIS    GERD (gastroesophageal reflux disease)     History of transfusion     SELF DONATED BLOOD IN 2005 AFTER BACK SURGERY AT [facility]    Hyperlipidemia     Hypertension     HISTORY OF NO LONGER REQUIRING MEDICATIONS DUE TO DIALYSIS    Lung injury     LUNG SCARING FROM COVID-19    Sleep apnea 08/09/2022    UNABLE TO TOLERATE WEARS 2L NC   ,   Past Surgical History:   Procedure Laterality Date    ARTERIOVENOUS FISTULA      BACK SURGERY      LUMBAR X3    CARDIAC CATHETERIZATION      CARPAL TUNNEL RELEASE Left     CATARACT EXTRACTION, BILATERAL      LENS IMPLANTS    COLONOSCOPY      CORONARY ARTERY BYPASS GRAFT      JANUARY 2023    FEMUR HARDWARE REMOVAL      GALLBLADDER SURGERY      HIP SURGERY Right     PLATE IN    INSERTION AND  REMOVAL PERITONEAL DIALYSIS CATHETER      LUMBAR DISCECTOMY FUSION INSTRUMENTATION N/A 12/13/2023    Procedure: LUMBAR FUSION L4-5;  Surgeon: Den Graves MD;  Location: [facility];  Service: Orthopedic Spine;  Laterality: N/A;    SUPRAPUBIC CATHETER INSERTION      AND REMOVAL    THORACENTESIS      TRANSPLANTATION RENAL Right 2014    TUNNELED VENOUS CATHETER PLACEMENT      VASECTOMY     ,   Family History   Problem Relation Age of Onset    Sudden death Mother     Hypertension Father     Sudden death Father     Heart disease Father         UNKNOWN    Heart attack Father         UNKNOWN   ,   Social History     Socioeconomic History    Marital status:    Tobacco Use    Smoking status: Never     Passive exposure: Past    Smokeless tobacco: Former   Vaping Use    Vaping Use: Never used   Substance and Sexual Activity    Alcohol use: Never    Drug use: Never    Sexual activity: Defer     E-cigarette/Vaping    E-cigarette/Vaping Use Never User      E-cigarette/Vaping Substances     E-cigarette/Vaping Devices         ,   Medications Prior to Admission   Medication Sig Dispense Refill Last Dose    aspirin 81 MG EC tablet Take 1 tablet by mouth Daily.   12/12/2023 at 0900    clopidogrel (PLAVIX) 75 MG tablet TAKE 1 TABLET BY MOUTH DAILY 90 tablet 0 12/6/2023    fenofibrate 160 MG tablet Take 1 tablet by mouth Daily.   12/12/2023    insulin glargine (LANTUS, SEMGLEE) 100 UNIT/ML injection Inject 16 Units under the skin into the appropriate area as directed Daily.   12/12/2023    Insulin Lispro (humaLOG) 100 UNIT/ML injection Inject  under the skin into the appropriate area as directed 3 (Three) Times a Day Before Meals. Sliding scale   12/12/2023    Lexapro 10 MG tablet Take 1 tablet by mouth Daily.   12/12/2023    Melatonin 10 MG capsule Take  by mouth.   12/12/2023    multivitamin (THERAGRAN) tablet tablet Take 1 tablet by mouth Daily.   12/12/2023    O2 (OXYGEN) Inhale 2 L/min Every Night.   12/13/2023     oxyCODONE-acetaminophen (PERCOCET) 5-325 MG per tablet As Needed.   12/12/2023    pantoprazole (PROTONIX) 40 MG EC tablet Take 1 tablet by mouth Daily.   12/12/2023    predniSONE (DELTASONE) 5 MG tablet Take 1 tablet by mouth Every Other Day.   Past Week    Probiotic Product (PROBIOTIC BLEND PO) Take  by mouth.   12/12/2023    Renvela 800 MG tablet Take 1 tablet by mouth 3 (Three) Times a Day. 2 tabs po w/ every meal   12/12/2023    rosuvastatin (CRESTOR) 20 MG tablet TAKE 1 TABLET BY MOUTH DAILY 90 tablet 3 12/12/2023    tacrolimus (PROGRAF) 1 MG capsule Take 0.5 mg by mouth 2 (Two) Times a Day.   12/12/2023    Vascepa 1 g capsule capsule    12/12/2023   , Scheduled Meds:  escitalopram, 10 mg, Oral, Daily  insulin detemir, 16 Units, Subcutaneous, Nightly  insulin lispro, 2-7 Units, Subcutaneous, 4x Daily AC & at Bedtime  lactobacillus acidophilus, 1 capsule, Oral, Daily  pantoprazole, 40 mg, Oral, Daily  predniSONE, 5 mg, Oral, Every Other Day  rosuvastatin, 20 mg, Oral, Nightly  sevelamer, 1,600 mg, Oral, TID With Meals  sodium chloride, 3 mL, Intravenous, Q12H  tacrolimus, 0.5 mg, Oral, BID   , and Continuous Infusions:  sodium chloride, 9 mL/hr, Last Rate: 9 mL/hr (12/13/23 1140)  [Held by provider] sodium chloride 0.9 % with KCl 20 mEq, 100 mL/hr       Objective     Vital Signs  Temp:  [97.2 °F (36.2 °C)-98.9 °F (37.2 °C)] 98.3 °F (36.8 °C)  Heart Rate:  [] 89  Resp:  [12-20] 18  BP: ()/(46-83) 102/60    I/O this shift:  In: -   Out: 65 [Drains:65]  I/O last 3 completed shifts:  In: 1120 [P.O.:120; I.V.:800; Blood:100; IV Piggyback:100]  Out: 455 [Drains:155; Blood:300]    Physical Exam  Constitutional:       General: He is not in acute distress.     Appearance: Normal appearance. He is not ill-appearing.   HENT:      Head: Normocephalic and atraumatic.      Nose: Nose normal.      Mouth/Throat:      Mouth: Mucous membranes are moist.   Eyes:      Pupils: Pupils are equal, round, and reactive to  "light.   Cardiovascular:      Rate and Rhythm: Normal rate and regular rhythm.      Pulses: Normal pulses.      Heart sounds: Normal heart sounds. No murmur heard.     No friction rub.   Pulmonary:      Effort: Pulmonary effort is normal.   Abdominal:      General: Abdomen is flat.   Musculoskeletal:      Cervical back: Normal range of motion.      Right lower leg: No edema.      Left lower leg: No edema.   Skin:     General: Skin is warm.   Neurological:      General: No focal deficit present.      Mental Status: He is alert and oriented to person, place, and time. Mental status is at baseline.         Results Review:    I reviewed the patient's new clinical results.    WBC WBC   Date Value Ref Range Status   12/14/2023 8.77 3.40 - 10.80 10*3/mm3 Final   12/13/2023 6.22 3.40 - 10.80 10*3/mm3 Final      HGB Hemoglobin   Date Value Ref Range Status   12/14/2023 9.0 (L) 13.0 - 17.7 g/dL Final     Comment:     RECHECKED VALUES WITH A RECOLLECTED SPECIMEN   12/13/2023 12.8 (L) 13.0 - 17.7 g/dL Final      HCT Hematocrit   Date Value Ref Range Status   12/14/2023 29.3 (L) 37.5 - 51.0 % Final   12/13/2023 40.2 37.5 - 51.0 % Final      Platlets No results found for: \"LABPLAT\"   MCV MCV   Date Value Ref Range Status   12/14/2023 103.2 (H) 79.0 - 97.0 fL Final   12/13/2023 99.3 (H) 79.0 - 97.0 fL Final          Sodium Sodium   Date Value Ref Range Status   12/14/2023 136 136 - 145 mmol/L Final   12/13/2023 138 136 - 145 mmol/L Final      Potassium Potassium   Date Value Ref Range Status   12/14/2023 5.4 (H) 3.5 - 5.2 mmol/L Final   12/13/2023 4.5 3.5 - 5.2 mmol/L Final      Chloride Chloride   Date Value Ref Range Status   12/14/2023 96 (L) 98 - 107 mmol/L Final   12/13/2023 94 (L) 98 - 107 mmol/L Final      CO2 CO2   Date Value Ref Range Status   12/14/2023 27.0 22.0 - 29.0 mmol/L Final   12/13/2023 31.0 (H) 22.0 - 29.0 mmol/L Final      BUN BUN   Date Value Ref Range Status   12/14/2023 52 (H) 6 - 20 mg/dL Final " "  12/13/2023 40 (H) 6 - 20 mg/dL Final      Creatinine Creatinine   Date Value Ref Range Status   12/14/2023 6.16 (H) 0.76 - 1.27 mg/dL Final   12/13/2023 4.80 (H) 0.76 - 1.27 mg/dL Final      Calcium Calcium   Date Value Ref Range Status   12/14/2023 9.0 8.6 - 10.5 mg/dL Final   12/13/2023 9.9 8.6 - 10.5 mg/dL Final      PO4 No results found for: \"CAPO4\"   Albumin Albumin   Date Value Ref Range Status   12/13/2023 3.9 3.5 - 5.2 g/dL Final      Magnesium No results found for: \"MG\"   Uric Acid No results found for: \"URICACID\"         Assessment & Plan       S/P lumbar fusion    Benign essential hypertension    Type 1 diabetes    End-stage renal disease    HLD (hyperlipidemia)    Coronary artery disease of bypass graft of native heart with stable angina pectoris    Back pain    A-fib      Assessment & Plan    ESRD:  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 Now on HD for 2 yrs. [facility] [place]. Dr Samaniego. Previously on PD.     Hyperkalemia: management with HD     Volume status: Stable    Anemia: ACD due to ESRD. BRANDON on HD days    Recs   HD today for 2.5 hr. Tolerating well so far. Plan [place] [facility] [place]     I discussed the patients findings and my recommendations with patient    Yimi Laguna MD  12/14/23  14:08 EST            "

## 2023-12-14 NOTE — PROGRESS NOTES
"Ortho Spine Progress Note     LOS: 1 day   Patient Care Team:  Diego Roberson MD as PCP - General (Family Medicine)    Subjective Pt awake and alert sitting up in chair. Has walked in the room. Usual post-op back pain, denies leg pain.    Objective     Vital Signs:  /53 (BP Location: Right arm, Patient Position: Lying)   Pulse 84   Temp 98.7 °F (37.1 °C) (Oral)   Resp 18   Ht 170.2 cm (67\")   Wt 83.9 kg (185 lb)   SpO2 96%   BMI 28.98 kg/m²          Labs:  Lab Results (last 24 hours)       Procedure Component Value Units Date/Time    CBC (No Diff) [[id number]]  (Abnormal) Collected: 12/14/23 0738    Specimen: Blood Updated: 12/14/23 0806     WBC 8.77 10*3/mm3      RBC 2.84 10*6/mm3      Hemoglobin 9.0 g/dL      Comment: RECHECKED VALUES WITH A RECOLLECTED SPECIMEN        Hematocrit 29.3 %      .2 fL      MCH 31.7 pg      MCHC 30.7 g/dL      RDW 13.2 %      RDW-SD 50.3 fl      MPV 9.9 fL      Platelets 162 10*3/mm3     Hepatitis Panel, Acute [[id number]] Collected: 12/14/23 0738    Specimen: Blood Updated: 12/14/23 0750    POC Glucose Once [[id number]]  (Abnormal) Collected: 12/14/23 0655    Specimen: Blood Updated: 12/14/23 0656     Glucose 168 mg/dL     Basic Metabolic Panel [[id number]]  (Abnormal) Collected: 12/14/23 0520    Specimen: Blood Updated: 12/14/23 0600     Glucose 158 mg/dL      BUN 52 mg/dL      Creatinine 6.16 mg/dL      Sodium 136 mmol/L      Potassium 5.4 mmol/L      Chloride 96 mmol/L      CO2 27.0 mmol/L      Calcium 9.0 mg/dL      BUN/Creatinine Ratio 8.4     Anion Gap 13.0 mmol/L      eGFR 10.2 mL/min/1.73      Comment: <15 Indicative of kidney failure       Narrative:      GFR Normal >60  Chronic Kidney Disease <60  Kidney Failure <15      POC Glucose Once [[id number]]  (Abnormal) Collected: 12/13/23 2334    Specimen: Blood Updated: 12/13/23 2335     Glucose 183 mg/dL     POC Glucose Once [[id number]]  (Normal) Collected: 12/13/23 2059    Specimen: Blood Updated: 12/13/23 " 2100     Glucose 118 mg/dL     POC Glucose Once [[id number]]  (Normal) Collected: 12/13/23 1834    Specimen: Blood Updated: 12/13/23 1836     Glucose 83 mg/dL     POC Glucose Once [[id number]]  (Normal) Collected: 12/13/23 1810    Specimen: Blood Updated: 12/13/23 1811     Glucose 100 mg/dL     Comprehensive Metabolic Panel [[id number]]  (Abnormal) Collected: 12/13/23 0954    Specimen: Blood Updated: 12/13/23 1057     Glucose 83 mg/dL      BUN 40 mg/dL      Creatinine 4.80 mg/dL      Sodium 138 mmol/L      Potassium 4.5 mmol/L      Chloride 94 mmol/L      CO2 31.0 mmol/L      Calcium 9.9 mg/dL      Total Protein 7.1 g/dL      Albumin 3.9 g/dL      ALT (SGPT) 63 U/L      AST (SGOT) 106 U/L      Alkaline Phosphatase 214 U/L      Total Bilirubin 1.1 mg/dL      Globulin 3.2 gm/dL      Comment: Calculated Result        A/G Ratio 1.2 g/dL      BUN/Creatinine Ratio 8.3     Anion Gap 13.0 mmol/L      eGFR 13.7 mL/min/1.73      Comment: <15 Indicative of kidney failure       Narrative:      GFR Normal >60  Chronic Kidney Disease <60  Kidney Failure <15      Protime-INR [[id number]]  (Abnormal) Collected: 12/13/23 0954    Specimen: Blood Updated: 12/13/23 1048     Protime 15.4 Seconds      INR 1.21    CBC (No Diff) [[id number]]  (Abnormal) Collected: 12/13/23 0954    Specimen: Blood Updated: 12/13/23 1037     WBC 6.22 10*3/mm3      RBC 4.05 10*6/mm3      Hemoglobin 12.8 g/dL      Hematocrit 40.2 %      MCV 99.3 fL      MCH 31.6 pg      MCHC 31.8 g/dL      RDW 12.5 %      RDW-SD 45.8 fl      MPV 10.0 fL      Platelets 169 10*3/mm3     POC Glucose Once [[id number]]  (Normal) Collected: 12/13/23 0941    Specimen: Blood Updated: 12/13/23 0943     Glucose 85 mg/dL             Physical Exam:  Neurovascular intact.  Calves soft, non-tender.     Assessment & Plan   Doing well. I am seeing patients for Dr. Graves who is out of town. Dialysis today. Mobilize with therapy. Likely home tomorrow.    Dwaine Tracy MD  12/14/23  08:14  EST

## 2023-12-14 NOTE — PROGRESS NOTES
"IM progress note      Min Landon  [id number]  1970     LOS: 1 day     Attending: Den Graves MD    Primary Care Provider: Diego Roberson MD      Chief Complaint/Reason for visit:  No chief complaint on file.      Subjective   Doing fairly well.  Has expected postoperative pain.  Little relief with pain medication.  Has chronic right hip pain.  No nausea, vomiting, or shortness of breath.  Had hemodialysis session earlier today.    Objective        Visit Vitals  BP (!) 88/59 (BP Location: Right arm, Patient Position: Sitting) Comment: Taken twice. Shows 81/58. Nurse notified.   Pulse 94   Temp 98.7 °F (37.1 °C) (Oral)   Resp 18   Ht 170.2 cm (67\")   Wt 83.9 kg (185 lb)   SpO2 98%   BMI 28.98 kg/m²     Temp (24hrs), Av.9 °F (36.6 °C), Min:96.4 °F (35.8 °C), Max:98.9 °F (37.2 °C)      Intake/Output:    Intake/Output Summary (Last 24 hours) at 2023 1605  Last data filed at 2023 1320  Gross per 24 hour   Intake 430 ml   Output 2050 ml   Net -1620 ml        Physical Therapy:    Goal Outcome Evaluation:  Plan of Care Reviewed With: patient, spouse  Progress: no change  Outcome Evaluation: Pt amb in [place] with FWW and CGA. No LOB noted. Activity limited by fatigue. HEP hand out given and spinal precautions reviewed. Frequent ambulation enecouraged. Recommend d/c home with assist when medically appropriate.        Anticipated Discharge Disposition (PT): home with assist                 Physical Exam:     General Appearance:    Alert, cooperative, in no acute distress   Head:    Normocephalic, without obvious abnormality, atraumatic    Lungs:     Normal effort, symmetric chest rise,  clear to      auscultation bilaterally              Heart:    Regular rhythm and normal rate, normal S1 and S2    Abdomen:     Normal bowel sounds, no masses, no organomegaly, soft        non-tender, non-distended, no guarding, no rebound                tenderness  Back: Clean dry and intact dressing.  " Drain to Hemovac present.   Extremities:  No clubbing, cyanosis or edema.  No deformities.    Pulses:   Pulses palpable and equal bilaterally   Skin:   No bleeding, bruising or rash  Neuro: Flexion and dorsiflexion intact bilateral feet.            Results Review:     I reviewed the patient's new clinical results.   Results from last 7 days   Lab Units 12/14/23  0738 12/13/23  0954   WBC 10*3/mm3 8.77 6.22   HEMOGLOBIN g/dL 9.0* 12.8*   HEMATOCRIT % 29.3* 40.2   PLATELETS 10*3/mm3 162 169     Results from last 7 days   Lab Units 12/14/23  0520 12/13/23  0954   SODIUM mmol/L 136 138   POTASSIUM mmol/L 5.4* 4.5   CHLORIDE mmol/L 96* 94*   CO2 mmol/L 27.0 31.0*   BUN mg/dL 52* 40*   CREATININE mg/dL 6.16* 4.80*   CALCIUM mg/dL 9.0 9.9   BILIRUBIN mg/dL  --  1.1   ALK PHOS U/L  --  214*   ALT (SGPT) U/L  --  63*   AST (SGOT) U/L  --  106*   GLUCOSE mg/dL 158* 83     I reviewed the patient's new imaging including images and reports.   Latest Reference Range & Units 12/13/23 23:34 12/14/23 05:20 12/14/23 06:55 12/14/23 14:00   Glucose 70 - 130 mg/dL 183 (H) 158 (H) 168 (H) 170 (H)   (H): Data is abnormally high  All medications reviewed.   escitalopram, 10 mg, Oral, Daily  insulin detemir, 16 Units, Subcutaneous, Nightly  insulin lispro, 2-7 Units, Subcutaneous, 4x Daily AC & at Bedtime  lactobacillus acidophilus, 1 capsule, Oral, Daily  pantoprazole, 40 mg, Oral, Daily  predniSONE, 5 mg, Oral, Every Other Day  rosuvastatin, 20 mg, Oral, Nightly  sevelamer, 1,600 mg, Oral, TID With Meals  sodium chloride, 3 mL, Intravenous, Q12H  tacrolimus, 0.5 mg, Oral, BID      acetaminophen, 650 mg, Q4H PRN  albumin human, 12.5 g, PRN  bisacodyl, 5 mg, Daily PRN  bisacodyl, 10 mg, Daily PRN  cyclobenzaprine, 10 mg, TID PRN  dextrose, 25 g, Q15 Min PRN  dextrose, 15 g, Q15 Min PRN  glucagon (human recombinant), 1 mg, Q15 Min PRN  HYDROmorphone, 1 mg, Q4H PRN   And  naloxone, 0.4 mg, Q5 Min PRN  labetalol, 10 mg, Q4H PRN  magnesium  hydroxide, 10 mL, Daily PRN  melatonin, 10 mg, Nightly PRN  methocarbamol, 1,000 mg, 4x Daily PRN  Morphine, 1 mg, Q4H PRN   And  naloxone, 0.4 mg, Q5 Min PRN  ondansetron, 4 mg, Q6H PRN  oxyCODONE, 5 mg, Q4H PRN  oxyCODONE-acetaminophen, 1 tablet, Q4H PRN  sodium chloride, 500 mL, TID PRN  sodium chloride, 500 mL, TID PRN  sodium chloride, 3-10 mL, PRN  sodium chloride, 40 mL, PRN        Assessment & Plan       S/P lumbar fusion    Benign essential hypertension    Type 1 diabetes    End-stage renal disease    HLD (hyperlipidemia)    Coronary artery disease of bypass graft of native heart with stable angina pectoris    Back pain    A-fib         Plan   1. PT-ambulate frequently.  Ambulated 3 times today already.  2. Pain control-prns       3. IS-encourage  4. DVT proph- Mechs  5. Bowel regimen  6. Resume home medications as appropriate  7. Monitor post-op labs  8. DC planning for home, likely tomorrow     DM  - hgb A1c on 12/7/23 7.0  - Continue long-acting insulin  - Accu-Chek AC and HS with low dose SSI     HTN, Hyperlipidemia, CAD  - Continue home statin  - Resume Plavix when okay with Dr. Graves, possibly postop day 3  - Monitor BP   - Holding parameters for BP meds  - Labetalol PRN for SBP>170     End-stage renal disease    -Consulted nephrology, following for hemodialysis need       Piero Hollis MD  12/14/23  16:05 EST

## 2023-12-15 ENCOUNTER — APPOINTMENT (OUTPATIENT)
Dept: NEPHROLOGY | Facility: HOSPITAL | Age: 53
End: 2023-12-15
Payer: MEDICARE

## 2023-12-15 ENCOUNTER — READMISSION MANAGEMENT (OUTPATIENT)
Dept: CALL CENTER | Facility: HOSPITAL | Age: 53
End: 2023-12-15
Payer: MEDICARE

## 2023-12-15 VITALS
HEIGHT: 67 IN | SYSTOLIC BLOOD PRESSURE: 130 MMHG | HEART RATE: 80 BPM | RESPIRATION RATE: 16 BRPM | OXYGEN SATURATION: 95 % | DIASTOLIC BLOOD PRESSURE: 56 MMHG | BODY MASS INDEX: 29.03 KG/M2 | WEIGHT: 185 LBS | TEMPERATURE: 98 F

## 2023-12-15 LAB — GLUCOSE BLDC GLUCOMTR-MCNC: 143 MG/DL (ref 70–130)

## 2023-12-15 PROCEDURE — 63710000001 TACROLIMUS PER 1 MG: Performed by: NURSE PRACTITIONER

## 2023-12-15 PROCEDURE — 82948 REAGENT STRIP/BLOOD GLUCOSE: CPT

## 2023-12-15 PROCEDURE — 63710000001 PREDNISONE PER 5 MG: Performed by: NURSE PRACTITIONER

## 2023-12-15 RX ORDER — CLOPIDOGREL BISULFATE 75 MG/1
75 TABLET ORAL DAILY
Start: 2023-12-18

## 2023-12-15 RX ORDER — ALBUMIN (HUMAN) 12.5 G/50ML
SOLUTION INTRAVENOUS
Status: DISCONTINUED
Start: 2023-12-15 | End: 2023-12-15 | Stop reason: HOSPADM

## 2023-12-15 RX ADMIN — Medication 1 CAPSULE: at 10:40

## 2023-12-15 RX ADMIN — OXYCODONE HYDROCHLORIDE AND ACETAMINOPHEN 1 TABLET: 10; 325 TABLET ORAL at 10:15

## 2023-12-15 RX ADMIN — OXYCODONE HYDROCHLORIDE AND ACETAMINOPHEN 1 TABLET: 10; 325 TABLET ORAL at 03:08

## 2023-12-15 RX ADMIN — Medication 3 ML: at 10:41

## 2023-12-15 RX ADMIN — ESCITALOPRAM OXALATE 10 MG: 10 TABLET ORAL at 10:41

## 2023-12-15 RX ADMIN — TACROLIMUS 0.5 MG: 0.5 CAPSULE ORAL at 10:40

## 2023-12-15 RX ADMIN — SEVELAMER CARBONATE 1600 MG: 800 TABLET, FILM COATED ORAL at 10:40

## 2023-12-15 RX ADMIN — PANTOPRAZOLE SODIUM 40 MG: 40 TABLET, DELAYED RELEASE ORAL at 10:41

## 2023-12-15 RX ADMIN — PREDNISONE 5 MG: 5 TABLET ORAL at 10:41

## 2023-12-15 NOTE — PROGRESS NOTES
"Ortho Spine Progress Note     LOS: 2 days   Patient Care Team:  Diego Roberson MD as PCP - General (Family Medicine)    Subjective Pt out of room in dialysis. Spoke to wife. Increased pain yesterday, better after IM pain meds. Discussed use of ice 20 min 2-3x/day when goes home to help control pain and swelling. Did pretty well with PT yesterday per wife and PT note.    Objective     Vital Signs:  /56   Pulse 90   Temp 98.1 °F (36.7 °C) (Oral)   Resp 16   Ht 170.2 cm (67\")   Wt 83.9 kg (185 lb)   SpO2 95%   BMI 28.98 kg/m²          Labs:  Lab Results (last 24 hours)       Procedure Component Value Units Date/Time    POC Glucose Once [[id number]]  (Abnormal) Collected: 12/14/23 2057    Specimen: Blood Updated: 12/14/23 2058     Glucose 162 mg/dL     POC Glucose Once [[id number]]  (Abnormal) Collected: 12/14/23 1639    Specimen: Blood Updated: 12/14/23 1642     Glucose 214 mg/dL     POC Glucose Once [[id number]]  (Abnormal) Collected: 12/14/23 1400    Specimen: Blood Updated: 12/14/23 1402     Glucose 170 mg/dL     Hepatitis Panel, Acute [[id number]]  (Normal) Collected: 12/14/23 0738    Specimen: Blood Updated: 12/14/23 1112     Hepatitis B Surface Ag Non-Reactive     Hep A IgM Non-Reactive     Hep B C IgM Non-Reactive     Hepatitis C Ab Non-Reactive    Narrative:      Results may be falsely decreased if patient taking Biotin.             Physical Exam:  Not performed this am.    Assessment & Plan   Pt progressing as expected after surgery. Dialysis this am. Will D/C drain, dry dressing change. Home today after seen by Dr. BRUCE sanchez per PCP 3 weeks after surgery, already arranged per wife. No new scripts for pain, has pain meds at home from pain clinic. OK to shower POD #3. F/U Dr. Graves 1 month.    Dwaine Tracy MD  12/15/23  08:08 EST      "

## 2023-12-15 NOTE — PLAN OF CARE
Problem: Adult Inpatient Plan of Care  Goal: Plan of Care Review  Outcome: Ongoing, Progressing  Goal: Patient-Specific Goal (Individualized)  Outcome: Ongoing, Progressing  Goal: Absence of Hospital-Acquired Illness or Injury  Outcome: Ongoing, Progressing  Intervention: Identify and Manage Fall Risk  Recent Flowsheet Documentation  Taken 12/14/2023 2200 by Jim Grant RN  Safety Promotion/Fall Prevention:   clutter free environment maintained   assistive device/personal items within reach  Intervention: Prevent Skin Injury  Recent Flowsheet Documentation  Taken 12/14/2023 2200 by Jim Grant RN  Body Position:   position changed independently   supine  Skin Protection: adhesive use limited  Intervention: Prevent and Manage VTE (Venous Thromboembolism) Risk  Recent Flowsheet Documentation  Taken 12/14/2023 2200 by Jim Grant RN  Activity Management: up in chair  VTE Prevention/Management:   bilateral   sequential compression devices on  Range of Motion: active ROM (range of motion) encouraged  Intervention: Prevent Infection  Recent Flowsheet Documentation  Taken 12/14/2023 2200 by Jim Grant RN  Infection Prevention: environmental surveillance performed  Goal: Optimal Comfort and Wellbeing  Outcome: Ongoing, Progressing  Intervention: Monitor Pain and Promote Comfort  Recent Flowsheet Documentation  Taken 12/14/2023 2200 by Jim Grant RN  Pain Management Interventions:   see MAR   relaxation techniques promoted  Intervention: Provide Person-Centered Care  Recent Flowsheet Documentation  Taken 12/14/2023 2200 by Jim Grant RN  Trust Relationship/Rapport:   care explained   thoughts/feelings acknowledged  Goal: Readiness for Transition of Care  Outcome: Ongoing, Progressing     Problem: Bleeding (Spinal Surgery)  Goal: Absence of Bleeding  Outcome: Ongoing, Progressing  Intervention: Monitor and Manage Bleeding  Recent Flowsheet Documentation  Taken 12/14/2023 2200 by Jim Grant  RN  Bleeding Management: dressing monitored     Problem: Bowel Motility Impaired (Spinal Surgery)  Goal: Effective Bowel Elimination  Outcome: Ongoing, Progressing     Problem: Fluid and Electrolyte Imbalance (Spinal Surgery)  Goal: Fluid and Electrolyte Balance  Outcome: Ongoing, Progressing     Problem: Functional Ability Impaired (Spinal Surgery)  Goal: Optimal Functional Ability  Outcome: Ongoing, Progressing  Intervention: Optimize Functional Status  Recent Flowsheet Documentation  Taken 12/14/2023 2200 by Jim Grant RN  Activity Management: up in chair  Positioning/Transfer Devices:   in use   pillows     Problem: Infection (Spinal Surgery)  Goal: Absence of Infection Signs and Symptoms  Outcome: Ongoing, Progressing  Intervention: Prevent or Manage Infection  Recent Flowsheet Documentation  Taken 12/14/2023 2200 by Jim Grant RN  Infection Prevention: environmental surveillance performed     Problem: Neurologic Impairment (Spinal Surgery)  Goal: Optimal Neurologic Function  Outcome: Ongoing, Progressing  Intervention: Optimize Neurologic Function  Recent Flowsheet Documentation  Taken 12/14/2023 2200 by Jim Grant RN  Body Position:   position changed independently   supine  Range of Motion: active ROM (range of motion) encouraged     Problem: Ongoing Anesthesia Effects (Spinal Surgery)  Goal: Anesthesia/Sedation Recovery  Outcome: Ongoing, Progressing  Intervention: Optimize Anesthesia Recovery  Recent Flowsheet Documentation  Taken 12/14/2023 2200 by Jim Grant RN  Safety Promotion/Fall Prevention:   clutter free environment maintained   assistive device/personal items within reach  Administration (IS): self-administered     Problem: Pain (Spinal Surgery)  Goal: Acceptable Pain Control  Outcome: Ongoing, Progressing  Intervention: Prevent or Manage Pain  Recent Flowsheet Documentation  Taken 12/14/2023 2200 by Jim Grant RN  Pain Management Interventions:   see MAR   relaxation  techniques promoted  Diversional Activities: television     Problem: Postoperative Nausea and Vomiting (Spinal Surgery)  Goal: Nausea and Vomiting Relief  Outcome: Ongoing, Progressing     Problem: Postoperative Urinary Retention (Spinal Surgery)  Goal: Effective Urinary Elimination  Outcome: Ongoing, Progressing  Intervention: Monitor and Manage Urinary Retention  Recent Flowsheet Documentation  Taken 12/14/2023 2200 by Jim Grant RN  Urinary Elimination Promotion: catheter patency maintained     Problem: Respiratory Compromise (Spinal Surgery)  Goal: Effective Oxygenation and Ventilation  Outcome: Ongoing, Progressing  Intervention: Optimize Oxygenation and Ventilation  Recent Flowsheet Documentation  Taken 12/14/2023 2200 by Jim Grant RN  Head of Bed (HOB) Positioning: HOB elevated  Airway/Ventilation Management: airway patency maintained     Problem: Diabetes Comorbidity  Goal: Blood Glucose Level Within Targeted Range  Outcome: Ongoing, Progressing  Intervention: Monitor and Manage Glycemia  Recent Flowsheet Documentation  Taken 12/14/2023 2200 by Jim Grnat RN  Glycemic Management: blood glucose monitored     Problem: Heart Failure Comorbidity  Goal: Maintenance of Heart Failure Symptom Control  Outcome: Ongoing, Progressing     Problem: Hypertension Comorbidity  Goal: Blood Pressure in Desired Range  Outcome: Ongoing, Progressing     Problem: Fall Injury Risk  Goal: Absence of Fall and Fall-Related Injury  Outcome: Ongoing, Progressing  Intervention: Promote Injury-Free Environment  Recent Flowsheet Documentation  Taken 12/14/2023 2200 by Jim Grant RN  Safety Promotion/Fall Prevention:   clutter free environment maintained   assistive device/personal items within reach     Problem: Device-Related Complication Risk (Hemodialysis)  Goal: Safe, Effective Therapy Delivery  Outcome: Ongoing, Progressing     Problem: Hemodynamic Instability (Hemodialysis)  Goal: Effective Tissue  Perfusion  Outcome: Ongoing, Progressing     Problem: Infection (Hemodialysis)  Goal: Absence of Infection Signs and Symptoms  Outcome: Ongoing, Progressing   Goal Outcome Evaluation:

## 2023-12-15 NOTE — PROGRESS NOTES
" LOS: 2 days   Patient Care Team:  Diego Roberson MD as PCP - General (Family Medicine)    Chief Complaint: ESRD    Subjective     Seen on HD tolerating well so far. Goal UF 2 liter as tolerated. Intra-dialytic hypotension noted s/p albumin infusion x 2.      Subjective    History taken from: patient    Objective     Vital Sign Min/Max for last 24 hours  Temp  Min: 96.4 °F (35.8 °C)  Max: 98.7 °F (37.1 °C)   BP  Min: 83/55  Max: 127/75   Pulse  Min: 84  Max: 94   Resp  Min: 12  Max: 20   SpO2  Min: 93 %  Max: 100 %   Flow (L/min)  Min: 2  Max: 2   No data recorded     Flowsheet Rows      Flowsheet Row First Filed Value   Admission Height 170.2 cm (67\") Documented at 12/13/2023 1002   Admission Weight 83.9 kg (185 lb) Documented at 12/13/2023 1002            No intake/output data recorded.  I/O last 3 completed shifts:  In: 1510 [P.O.:1200; I.V.:310]  Out: 2020 [Drains:190]    Objective:  General Appearance:  Comfortable.    Vital signs: (most recent): Blood pressure 101/65, pulse 86, temperature 97.3 °F (36.3 °C), temperature source Oral, resp. rate 16, height 170.2 cm (67\"), weight 83.9 kg (185 lb), SpO2 95%.  Vital signs are normal.    Output: No urine output.    HEENT: Normal HEENT exam.    Lungs:  Normal effort and normal respiratory rate.  Breath sounds clear to auscultation.  He is not in respiratory distress.  No decreased breath sounds or wheezes.    Heart: Normal rate.  Regular rhythm.  S1 normal and S2 normal.  No murmur or gallop.   Abdomen: Abdomen is soft.  Bowel sounds are normal.     Extremities: Normal range of motion.  There is no dependent edema or local swelling.    Pulses: Distal pulses are intact.    Neurological: Patient is alert and oriented to person, place and time.  Normal strength.    Pupils:  Pupils are equal, round, and reactive to light.    Skin:  Warm.                Results Review:     I reviewed the patient's new clinical results.    WBC WBC   Date Value Ref Range Status " "  12/14/2023 8.77 3.40 - 10.80 10*3/mm3 Final   12/13/2023 6.22 3.40 - 10.80 10*3/mm3 Final      HGB Hemoglobin   Date Value Ref Range Status   12/14/2023 9.0 (L) 13.0 - 17.7 g/dL Final     Comment:     RECHECKED VALUES WITH A RECOLLECTED SPECIMEN   12/13/2023 12.8 (L) 13.0 - 17.7 g/dL Final      HCT Hematocrit   Date Value Ref Range Status   12/14/2023 29.3 (L) 37.5 - 51.0 % Final   12/13/2023 40.2 37.5 - 51.0 % Final      Platlets No results found for: \"LABPLAT\"   MCV MCV   Date Value Ref Range Status   12/14/2023 103.2 (H) 79.0 - 97.0 fL Final   12/13/2023 99.3 (H) 79.0 - 97.0 fL Final          Sodium Sodium   Date Value Ref Range Status   12/14/2023 136 136 - 145 mmol/L Final   12/13/2023 138 136 - 145 mmol/L Final      Potassium Potassium   Date Value Ref Range Status   12/14/2023 5.4 (H) 3.5 - 5.2 mmol/L Final   12/13/2023 4.5 3.5 - 5.2 mmol/L Final      Chloride Chloride   Date Value Ref Range Status   12/14/2023 96 (L) 98 - 107 mmol/L Final   12/13/2023 94 (L) 98 - 107 mmol/L Final      CO2 CO2   Date Value Ref Range Status   12/14/2023 27.0 22.0 - 29.0 mmol/L Final   12/13/2023 31.0 (H) 22.0 - 29.0 mmol/L Final      BUN BUN   Date Value Ref Range Status   12/14/2023 52 (H) 6 - 20 mg/dL Final   12/13/2023 40 (H) 6 - 20 mg/dL Final      Creatinine Creatinine   Date Value Ref Range Status   12/14/2023 6.16 (H) 0.76 - 1.27 mg/dL Final   12/13/2023 4.80 (H) 0.76 - 1.27 mg/dL Final      Calcium Calcium   Date Value Ref Range Status   12/14/2023 9.0 8.6 - 10.5 mg/dL Final   12/13/2023 9.9 8.6 - 10.5 mg/dL Final      PO4 No results found for: \"CAPO4\"   Albumin Albumin   Date Value Ref Range Status   12/13/2023 3.9 3.5 - 5.2 g/dL Final      Magnesium No results found for: \"MG\"   Uric Acid No results found for: \"URICACID\"     Medication Review: Yes    Assessment & Plan       S/P lumbar fusion    Benign essential hypertension    Type 1 diabetes    End-stage renal disease    HLD (hyperlipidemia)    Coronary artery " disease of bypass graft of native heart with stable angina pectoris    Back pain    A-fib      Assessment & Plan    ESRD:  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 Now [place] 2 yrs[place] [facility] [place] Sam[place]. Previously on PD.      Hyperkalemia: management with HD     Volume status: Stable     Anemia: ACD due to ESRD. BRANDON on HD days     Recs  HD per MWF edmund.   Stable for discharge from renal standpoint.      I discussed the patients findings and my recommendations with patient        Yimi Laguna MD  12/15/23  10:14 EST

## 2023-12-15 NOTE — PLAN OF CARE
Problem: Device-Related Complication Risk (Hemodialysis)  Goal: Safe, Effective Therapy Delivery  Outcome: Ongoing, Progressing  Intervention: Optimize Device Care and Function  Recent Flowsheet Documentation  Taken 12/15/2023 4648 by Jan Ramirez RN  Medication Review/Management:   medications reviewed   high-risk medications identified     Problem: Hemodynamic Instability (Hemodialysis)  Goal: Effective Tissue Perfusion  Outcome: Ongoing, Progressing     Problem: Infection (Hemodialysis)  Goal: Absence of Infection Signs and Symptoms  Outcome: Ongoing, Progressing   Goal Outcome Evaluation:               HD completed. UF goal decreased slightly due to low BP. MD aware. Tolerated well otherwise. Blood returned. Report to MAURIZIO Treviño

## 2023-12-15 NOTE — DISCHARGE SUMMARY
Patient Name: Min Landon  MRN: [id number]  : 1970  DOS: 12/15/2023    Attending: Den Graves MD    Primary Care Provider: Diego Roberson MD    Date of Admission:.2023  8:39 AM    Date of Discharge:  12/15/2023    Discharge Diagnosis:   S/P lumbar fusion    Benign essential hypertension    Type 1 diabetes    End-stage renal disease    HLD (hyperlipidemia)    Coronary artery disease of bypass graft of native heart with stable angina pectoris    Back pain    A-fib      Hospital Course    At Admit:    Patient is a pleasant 53 y.o. male presented for scheduled surgery by Dr. Graves.  He underwent lumbar fusion under general anesthesia.  He tolerated surgery well and was admitted for further medical management.     When seen in PACU he is sedated with oral airway in place. He appears comfortable and in stable condition.    After admit:    Patient was provided pain medications as needed for pain control.    Adjustments were made to pain medications to optimize postop pain management.     Risks and benefits of opiate medications discussed with patient.  Ankur report in chart was reviewed prior to discharge.    He was seen by NAL for HD needs and he received HD twice during his stay.     He was seen by PT and has progressed well over his stay.    He used an IS for atelectasis prophylaxis and mechanicals for DVT prophylaxis.  Home medications were resumed as appropriate, and labs were monitored and remained fairly stable.     Incidentally he was noted to have wire piece vs suture protrude from area of xyphoid. No redness or tenderness or drainage. Area was cleaned and dressed and f/u appt with his CTS  was made.     With the progress he has made, pt is ready for DC home today.    Discussed with patient regarding plan and he shows understanding and agreement.       Pain medication at discharge per .      Procedures Performed      PREOPERATIVE DIAGNOSES:  L4-L5  spondylolisthesis.   Adjacent segment disease with spinal stenosis at L4-L5.   Status post L5-S1 fusion.   Renal failure.      POSTOPERATIVE DIAGNOSES:  L4-L5 spondylolisthesis.   Adjacent segment disease with spinal stenosis at L4-L5.  Status post L5-S1 fusion.   Renal failure.      PROCEDURES PERFORMED:   Decompressive laminectomy, facetectomy and foraminotomies L4, L5 to decompress neural elements.   Segmental instrumentation L4-L5-S1 with DePuy transpedicular fixation.   Transforaminal lumbar interbody fusion L4-L5 with DePuy/Synthes interbody fusion cage and bone graft.   Posterolateral fusion L4-L5 with bone graft.   Removal of hardware L5-S1 and inspection of fusion. Fusion noted to be solid.   Harvesting of bone graft locally from spinous processes and lamina.   Use of Vivigen allograft.      SURGEON: Star Crabtree MD              Pertinent Test Results:    I reviewed the patient's new clinical results.   Results from last 7 days   Lab Units 12/14/23  0738 12/13/23  0954   WBC 10*3/mm3 8.77 6.22   HEMOGLOBIN g/dL 9.0* 12.8*   HEMATOCRIT % 29.3* 40.2   PLATELETS 10*3/mm3 162 169     Results from last 7 days   Lab Units 12/14/23  0520 12/13/23  0954   SODIUM mmol/L 136 138   POTASSIUM mmol/L 5.4* 4.5   CHLORIDE mmol/L 96* 94*   CO2 mmol/L 27.0 31.0*   BUN mg/dL 52* 40*   CREATININE mg/dL 6.16* 4.80*   CALCIUM mg/dL 9.0 9.9   BILIRUBIN mg/dL  --  1.1   ALK PHOS U/L  --  214*   ALT (SGPT) U/L  --  63*   AST (SGOT) U/L  --  106*   GLUCOSE mg/dL 158* 83     I reviewed the patient's new imaging including images and reports.      Physical therapy:  Marika Gonzales, PT   Physical Therapist  Physical Therapy     Plan of Care      Signed     Date of Service: 12/14/23 0817  Creation Time: 12/14/23 0850     Signed         Goal Outcome Evaluation:  Plan of Care Reviewed With: patient, spouse  Progress: no change  Outcome Evaluation: Pt amb in [place] with FWW and CGA. No LOB noted. Activity limited by fatigue. HEP hand  "out given and spinal precautions reviewed. Frequent ambulation enecouraged. Recommend d/c home with assist when medically appropriate.        Anticipated Discharge Disposition (PT): home with assist                Discharge Assessment:    Vital Signs  Visit Vitals  /56 (BP Location: Right arm, Patient Position: Lying)   Pulse 80   Temp 98 °F (36.7 °C) (Oral)   Resp 16   Ht 170.2 cm (67\")   Wt 83.9 kg (185 lb)   SpO2 95%   BMI 28.98 kg/m²     Temp (24hrs), Av.9 °F (36.6 °C), Min:96.4 °F (35.8 °C), Max:98.7 °F (37.1 °C)      General Appearance:    Alert, cooperative, in no acute distress   Lungs:     Clear to auscultation,respirations regular, even and unlabored    Heart:    Regular rhythm and normal rate, normal S1 and S2    Abdomen:     Normal bowel sounds, no masses, no organomegaly, soft  non-tender, non-distended, no guarding, no rebound tenderness   Extremities:   Moves all extremities well, no edema, no cyanosis, no redness   Pulses:   Pulses palpable and equal bilaterally   Skin:   No bleeding, bruising or rash. Back dressing CDI.   Neurologic:   Cranial nerves 2 - 12 grossly intact, sensation intact, no gross deficit. Flexion and dorsiflexion intact bilateral feet.       Discharge Disposition: Home.          Discharge Medications        Changes to Medications        Instructions Start Date   clopidogrel 75 MG tablet  Commonly known as: PLAVIX  What changed: These instructions start on 2023. If you are unsure what to do until then, ask your doctor or other care provider.   75 mg, Oral, Daily   Start Date: 2023            Continue These Medications        Instructions Start Date   aspirin 81 MG EC tablet   81 mg, Oral, Daily      fenofibrate 160 MG tablet   160 mg, Oral, Daily      insulin glargine 100 UNIT/ML injection  Commonly known as: LANTUS, SEMGLEE   16 Units, Subcutaneous, Daily      Insulin Lispro 100 UNIT/ML injection  Commonly known as: humaLOG   Subcutaneous, 3 " Times Daily Before Meals, Sliding scale      Lexapro 10 MG tablet  Generic drug: escitalopram   1 tablet, Oral, Daily      Melatonin 10 MG capsule   Oral      multivitamin tablet tablet   1 tablet, Oral, Daily      O2  Commonly known as: OXYGEN   2 L/min, Inhalation, Nightly      oxyCODONE-acetaminophen 5-325 MG per tablet  Commonly known as: PERCOCET   As Needed      pantoprazole 40 MG EC tablet  Commonly known as: PROTONIX   40 mg, Oral, Daily      predniSONE 5 MG tablet  Commonly known as: DELTASONE   5 mg, Oral, Every Other Day      PROBIOTIC BLEND PO   Oral      Renvela 800 MG tablet  Generic drug: sevelamer   800 mg, Oral, 3 Times Daily, 2 tabs po w/ every meal      rosuvastatin 20 MG tablet  Commonly known as: CRESTOR   20 mg, Oral, Daily      tacrolimus 1 MG capsule  Commonly known as: PROGRAF   0.5 mg, Oral, 2 Times Daily      Vascepa 1 g capsule capsule  Generic drug: icosapent ethyl                Discharge Diet: Resume prior.       Activity at Discharge: ambulate. Restrictions per .        Follow-up Appointments  Den Graves MD  per his orders       Piero Hollis MD  12/15/23  12:07 EST

## 2023-12-16 NOTE — OUTREACH NOTE
Prep Survey      Flowsheet Row Responses   [facility] facility patient discharged from? [facility]   Is LACE score < 7 ? No   Eligibility Readm Mgmt   Discharge diagnosis Back pain LUMBAR FUSION L4-5 today.   Does the patient have one of the following disease processes/diagnoses(primary or secondary)? General Surgery   Does the patient have Home health ordered? No   Is there a DME ordered? No   Comments regarding appointments [facility]   Prep survey completed? Yes            DARIN SNELL - Registered Nurse

## 2023-12-19 ENCOUNTER — READMISSION MANAGEMENT (OUTPATIENT)
Dept: CALL CENTER | Facility: HOSPITAL | Age: 53
End: 2023-12-19
Payer: MEDICARE

## 2023-12-19 NOTE — OUTREACH NOTE
General Surgery Week 1 Survey      Flowsheet Row Responses   [facility] patient discharged from? [place]   Does the patient have one of the following disease processes/diagnoses(primary or secondary)? General Surgery   Week 1 attempt successful? No  [Reached patient who requested call back tomorrow-going to f/u appt.]   Unsuccessful attempts Attempt 1            Tracy LOREDO - Registered Nurse

## 2023-12-29 ENCOUNTER — READMISSION MANAGEMENT (OUTPATIENT)
Dept: CALL CENTER | Facility: HOSPITAL | Age: 53
End: 2023-12-29
Payer: MEDICARE

## 2023-12-29 NOTE — OUTREACH NOTE
General Surgery Week 1 Survey      Flowsheet Row Responses   [facility] patient discharged from? [place]   Does the patient have one of the following disease processes/diagnoses(primary or secondary)? General Surgery   Week 1 attempt successful? Yes   Call start time 1441   Call end time 1445   Discharge diagnosis Back pain LUMBAR FUSION L4-5 today.   Person spoke with today (if not patient) and relationship Patient   Meds reviewed with patient/caregiver? Yes   Is the patient having any side effects they believe may be caused by any medication additions or changes? No   Does the patient have all medications related to this admission filled (includes all antibiotics, pain medications, etc.) Yes   Is the patient taking all medications as directed (includes completed medication regime)? Yes   Does the patient have a follow up appointment scheduled with their surgeon? Yes   Has the patient kept scheduled appointments due by today? Yes   Comments Cardiothoracic surgery f/u was on 12/19/23. Pt has PCP f/u appt with staple removal on 1/2/24.   Has home health visited the patient within 72 hours of discharge? N/A   DME comments Pt wears oxygen, 2 LPM via nasal cannula at night and at dialysis. Pt has rollator in home.   Psychosocial issues? No   Comments Brief call. Pt states he is doing ok. Pt reports pain in left leg still. Incision healing, no s/s of infection. Pt is ambulating often, daily. Pt has HD on MWF at [facility].   Did the patient receive a copy of their discharge instructions? Yes   Nursing interventions Reviewed instructions with patient   What is the patient's perception of their health status since discharge? Improving   Nursing interventions Nurse provided patient education   Is the patient /caregiver able to teach back basic post-op care? Take showers only when approved by MD-sponge bathe until then, No tub bath, swimming, or hot tub until instructed by MD, Keep incision areas clean,dry and  protected, Do not remove steri-strips, Lifting as instructed by MD in discharge instructions   Is the patient/caregiver able to teach back signs and symptoms of incisional infection? Increased redness, swelling or pain at the incisonal site, Increased drainage or bleeding, Incisional warmth, Pus or odor from incision, Fever   Is the patient/caregiver able to teach back steps to recovery at home? Set small, achievable goals for return to baseline health, Rest and rebuild strength, gradually increase activity   If the patient is a current smoker, are they able to teach back resources for cessation? Not a smoker   Is the patient/caregiver able to teach back the hierarchy of who to call/visit for symptoms/problems? PCP, Specialist, Home health nurse, Urgent Care, ED, 911 Yes   Week 1 call completed? Yes   Graduated Yes   Is the patient interested in additional calls from an ambulatory ? No   Would this patient benefit from a Referral to [facility] Social Work? No   Wrap up additional comments Pt denies any concerns or needs. Hospital f/u appts made and has seen CTS MD already.   Call end time 1445            Ladonna COUCH - Registered Nurse

## 2024-02-08 ENCOUNTER — OFFICE VISIT (OUTPATIENT)
Dept: CARDIOLOGY | Facility: CLINIC | Age: 54
End: 2024-02-08
Payer: MEDICARE

## 2024-02-08 VITALS
OXYGEN SATURATION: 98 % | HEART RATE: 86 BPM | DIASTOLIC BLOOD PRESSURE: 80 MMHG | HEIGHT: 67 IN | WEIGHT: 187.2 LBS | SYSTOLIC BLOOD PRESSURE: 134 MMHG | BODY MASS INDEX: 29.38 KG/M2

## 2024-02-08 DIAGNOSIS — I10 ESSENTIAL HYPERTENSION: ICD-10-CM

## 2024-02-08 DIAGNOSIS — Z95.1 HX OF CABG: ICD-10-CM

## 2024-02-08 DIAGNOSIS — I25.708 CORONARY ARTERY DISEASE OF BYPASS GRAFT OF NATIVE HEART WITH STABLE ANGINA PECTORIS: Primary | Chronic | ICD-10-CM

## 2024-02-08 PROCEDURE — 3079F DIAST BP 80-89 MM HG: CPT | Performed by: NURSE PRACTITIONER

## 2024-02-08 PROCEDURE — 3075F SYST BP GE 130 - 139MM HG: CPT | Performed by: NURSE PRACTITIONER

## 2024-02-08 PROCEDURE — 1160F RVW MEDS BY RX/DR IN RCRD: CPT | Performed by: NURSE PRACTITIONER

## 2024-02-08 PROCEDURE — 99214 OFFICE O/P EST MOD 30 MIN: CPT | Performed by: NURSE PRACTITIONER

## 2024-02-08 PROCEDURE — 1159F MED LIST DOCD IN RCRD: CPT | Performed by: NURSE PRACTITIONER

## 2024-02-08 RX ORDER — INSULIN ASPART 100 [IU]/ML
INJECTION, SOLUTION INTRAVENOUS; SUBCUTANEOUS
COMMUNITY

## 2024-02-08 RX ORDER — SUCROFERRIC OXYHYDROXIDE 500 MG/1
1000 TABLET, CHEWABLE ORAL 3 TIMES DAILY PRN
COMMUNITY
Start: 2023-12-20

## 2024-02-08 NOTE — PROGRESS NOTES
Date: 23  Procedure: Labor Epidural         Relevant Problems   ANESTHESIA (within normal limits)      PULMONARY (within normal limits)      NEURO (within normal limits)      CARDIAC (within normal limits)      GI (within normal limits)       (within normal limits)      ENDO (within normal limits)      OB (within normal limits)   BRIANDA    Physical Exam    Airway   Mallampati: II  TM distance: >3 FB  Neck ROM: full       Cardiovascular - normal exam  Rhythm: regular  Rate: normal  (-) murmur     Dental - normal exam           Pulmonary - normal exam  Breath sounds clear to auscultation     Abdominal    Neurological - normal exam                 Anesthesia Plan    ASA 2       Plan - epidural   Neuraxial block will be primary anesthetic      (Labor epidural converted to primary  for failure to progress)            Pertinent diagnostic labs and testing reviewed    Informed Consent:    Anesthetic plan and risks discussed with patient.         Subjective     Min Landon is a 53 y.o. male who presents to day for 6 month follow up  and Coronary Artery Disease.    CHIEF COMPLIANT  Chief Complaint   Patient presents with    6 month follow up     Coronary Artery Disease       Active Problems:  Problem List Items Addressed This Visit          Cardiac and Vasculature    Coronary artery disease of bypass graft of native heart with stable angina pectoris - Primary (Chronic)     Other Visit Diagnoses       Hx of CABG        Essential hypertension            Problem list:     1.  Shortness of breath  1.1 stress test 11/21: Fixed moderate size perfusion defect lateral myocardium from the apex to the base involving portions of the anterior and inferior myocardium tiny component of reversibility at the apex of the myocardium.  Decreased wall motion at the apex and septum decreased myocardial wall thickening at the apex and mid lateral wall, EF slightly low at 48%  1.2 [facility], 1- per Dr. Saul. Unsuccessful attempt of  and revascularization of First Diag. Of the  LAD and First OM of Left Circ., distal Left Circ. 50%, PLAD 70%. Medical management.  1.3 patient evaluated by  clinic in [place] and was found not to be a candidate for revascularization of his .  1.2 echocardiogram 1/22: EF 45 to 50% lateral hypokinesis  1.3 echocardiogram 4/22: EF 45 to 50%, dilated left atrium, trivial PI, trivial TR peak PA pressures of 48 mmHg, left pleural effusion, normal diastolic function  1.4  CABG 1/10/2023: LIMA to LAD and SVG to PDA  2.  Palpitations  3.  Diabetes mellitus  4.  Hyperlipidemia  5.  Hypertension  6.  Chronic kidney disease on dialysis, renal transplant list  7. Atrial fibrillation [id number] score of 4 on ASA    HPI  HPI  Mr. Min Landon is a 53-year-old male patient who is being followed up today for coronary artery disease and heart failure with reduced ejection fraction.    Patient does have an extensive history of coronary artery  disease.  Patient does have a history of previous stenting as well as coronary artery bypass grafting in 2023 in which she went under LIMA to LAD and SVG to PDA.  He is on dual antiplatelet therapy of aspirin and Plavix and high intensity statin therapy of rosuvastatin.    Patient does have chronic arterial hypertension in which his blood pressure is 134/80 with a heart rate of 86.  His antihypertensive therapy has been discontinued due to significant hypotension especially on days of dialysis.  He does have some associated dizziness that occurs on days of dialysis.    Patient does have heart failure with reduced ejection fraction and which most recent ejection fraction was 45 to 50% per echocardiogram.  He was on metoprolol succinate due to his heart failure with reduced ejection fraction but due to hypotension this had to be discontinued.  He does have chronic renal failure on dialysis so ACE/ARB/ARNI is also been deferred as well as SLG 2 therapy.    Patient does have a history of atrial fibrillation in which she has a [id number] score of 4.Per chart review, Eliquis was discontinued and replaced with Plavix and aspirin by Dr. Woodruff to decrease his bleeding risk in the setting of dialysis, as he was in normal sinus rhythm. He noted excessive bleeding in the past while taking Eliquis on an occasion when he required stitches in the emergency room.     He says overall he feels pretty good.  He denies any chest pain, shortness of breath, lower extremity edema, palpitations, fatigue, syncope, PND, orthopnea, or strokelike symptoms.  PRIOR MEDS  Current Outpatient Medications on File Prior to Visit   Medication Sig Dispense Refill    aspirin 81 MG EC tablet Take 1 tablet by mouth Daily.      clopidogrel (PLAVIX) 75 MG tablet Take 1 tablet by mouth Daily.      escitalopram (LEXAPRO) 20 MG tablet Take 1 tablet by mouth Daily.      fenofibrate 160 MG tablet Take 1 tablet by mouth Daily.      Insulin Aspart (novoLOG) 100  UNIT/ML injection Inject  under the skin into the appropriate area as directed. Sliding scale      insulin glargine (LANTUS, SEMGLEE) 100 UNIT/ML injection Inject 16 Units under the skin into the appropriate area as directed Daily.      Melatonin 10 MG capsule Take  by mouth.      multivitamin (THERAGRAN) tablet tablet Take 1 tablet by mouth Daily.      O2 (OXYGEN) Inhale 2 L/min Every Night.      oxyCODONE-acetaminophen (PERCOCET) 7.5-325 MG per tablet Take 1 tablet by mouth As Needed.      pantoprazole (PROTONIX) 40 MG EC tablet Take 1 tablet by mouth Daily.      predniSONE (DELTASONE) 5 MG tablet Take 1 tablet by mouth Every Other Day.      rosuvastatin (CRESTOR) 20 MG tablet TAKE 1 TABLET BY MOUTH DAILY 90 tablet 3    tacrolimus (PROGRAF) 1 MG capsule Take 0.5 mg by mouth 2 (Two) Times a Day.      Velphoro 500 MG chewable tablet Chew 2 tablets 3 (Three) Times a Day As Needed.       No current facility-administered medications on file prior to visit.       ALLERGIES  Bactrim [sulfamethoxazole-trimethoprim]    HISTORY  Past Medical History:   Diagnosis Date    A-fib     Arthritis     Calciphylaxis     Cancer     SKIN CANCER    CHF (congestive heart failure)     Chronic kidney disease     Coronary artery disease of bypass graft of native heart with stable angina pectoris 04/19/2022    Diabetes mellitus     Elevated cholesterol     End stage renal disease     M/W/F DIALYSIS    GERD (gastroesophageal reflux disease)     History of transfusion     SELF DONATED BLOOD IN 2005 AFTER BACK SURGERY AT [facility]    Hyperlipidemia     Hypertension     HISTORY OF NO LONGER REQUIRING MEDICATIONS DUE TO DIALYSIS    Lung injury     LUNG SCARING FROM COVID-19    Sleep apnea 08/09/2022    UNABLE TO TOLERATE WEARS 2L NC       Social History     Socioeconomic History    Marital status:    Tobacco Use    Smoking status: Never     Passive exposure: Past    Smokeless tobacco: Former   Vaping Use    Vaping Use: Never used   Substance  "and Sexual Activity    Alcohol use: Never    Drug use: Never    Sexual activity: Defer       Family History   Problem Relation Age of Onset    Sudden death Mother     Hypertension Father     Sudden death Father     Heart disease Father         UNKNOWN    Heart attack Father         UNKNOWN       Review of Systems   Constitutional:  Negative for chills, fatigue and fever.   HENT: Negative.  Negative for congestion, rhinorrhea and sore throat.    Eyes: Negative.  Negative for visual disturbance.   Respiratory:  Positive for apnea (does not use C PAP uses O2 @ night). Negative for chest tightness and shortness of breath.    Cardiovascular: Negative.  Negative for chest pain, palpitations and leg swelling.   Gastrointestinal:  Positive for constipation. Negative for diarrhea and nausea.   Musculoskeletal:  Positive for arthralgias and back pain (back surgery 1/2024). Negative for neck pain.   Allergic/Immunologic: Positive for environmental allergies. Negative for food allergies.   Neurological:  Positive for dizziness (on dialysis days), weakness and light-headedness. Negative for syncope.   Hematological:  Bruises/bleeds easily.   Psychiatric/Behavioral:  Negative for sleep disturbance.        Objective     VITALS: /80 (BP Location: Left arm, Patient Position: Sitting, Cuff Size: Adult)   Pulse 86   Ht 170.2 cm (67.01\")   Wt 84.9 kg (187 lb 3.2 oz)   SpO2 98%   BMI 29.31 kg/m²     LABS:   Lab Results (most recent)       None            IMAGING:   No Images in the past 120 days found..    EXAM:  Physical Exam  Vitals and nursing note reviewed.   Constitutional:       Appearance: He is well-developed.   HENT:      Head: Normocephalic.   Neck:      Thyroid: No thyroid mass.      Vascular: No carotid bruit or JVD.      Trachea: Trachea and phonation normal.   Cardiovascular:      Rate and Rhythm: Normal rate and regular rhythm.      Pulses:           Radial pulses are 2+ on the right side and 2+ on the left " side.        Posterior tibial pulses are 2+ on the right side and 2+ on the left side.      Heart sounds: Normal heart sounds. Murmur heard.      No friction rub. No gallop.   Pulmonary:      Effort: Pulmonary effort is normal. No respiratory distress.      Breath sounds: Normal breath sounds. No wheezing or rales.   Musculoskeletal:         General: No swelling. Normal range of motion.      Cervical back: Neck supple.   Skin:     General: Skin is warm and dry.      Capillary Refill: Capillary refill takes less than 2 seconds.      Findings: No rash.   Neurological:      Mental Status: He is alert and oriented to person, place, and time.   Psychiatric:         Speech: Speech normal.         Behavior: Behavior normal.         Thought Content: Thought content normal.         Judgment: Judgment normal.         Procedure   Procedures       Assessment & Plan    Diagnosis Plan   1. Coronary artery disease of bypass graft of native heart with stable angina pectoris        2. Hx of CABG        3. Essential hypertension        1.  Patient does have essential coronary artery disease which she is on dual antiplatelet therapy of aspirin and Plavix.  He is on high intensity therapy of rosuvastatin.  Overall he denies any angina anginal equivalent symptoms.  2.  Patient's blood pressure is controlled on current blood pressure medication regimen.  No medication changes are warranted at this time.  Patient advised to monitor blood pressure on a daily basis and report any persistent highs or lows.  Set goal blood pressure for patient at 130/80 or below.  3.  Overall he feels like he is done relatively well from the cardiovascular standpoint.  Will continue to follow.  4.Informed of signs and symptoms of ACS and advised to seek emergent treatment for any new worsening symptoms.  Patient also advised sooner follow-up as needed.  Also advised to follow-up with family doctor as needed  This note is dictated utilizing voice recognition  software.  Although this record has been proof read, transcriptional errors may still be present. If questions occur regarding the content of this record please do not hesitate to call our office.  I have reviewed and confirmed the accuracy of the ROS as documented by the MA/LPN/RN RASHAD Garza    Return if symptoms worsen or fail to improve, for Next scheduled follow up.    Diagnoses and all orders for this visit:    1. Coronary artery disease of bypass graft of native heart with stable angina pectoris (Primary)    2. Hx of CABG    3. Essential hypertension        Min Landon  reports that he has never smoked. He has been exposed to tobacco smoke. He has quit using smokeless tobacco.. I have educated him on the risk of diseases from using tobacco products such as cancer, COPD, and heart disease. Patient does not smoke.  I spent 3  minutes counseling the patient.           BMI is >= 25 and <30. (Overweight) The following options were offered after discussion;: exercise counseling/recommendations           MEDS ORDERED DURING VISIT:  No orders of the defined types were placed in this encounter.          This document has been electronically signed by RASHAD Garza Jr.  February 19, 2024 08:37 EST

## 2024-03-13 ENCOUNTER — TELEPHONE (OUTPATIENT)
Dept: CARDIOLOGY | Facility: CLINIC | Age: 54
End: 2024-03-13
Payer: MEDICARE

## 2024-03-13 DIAGNOSIS — R06.02 SHORTNESS OF BREATH: ICD-10-CM

## 2024-03-13 DIAGNOSIS — I25.708 CORONARY ARTERY DISEASE OF BYPASS GRAFT OF NATIVE HEART WITH STABLE ANGINA PECTORIS: Primary | Chronic | ICD-10-CM

## 2024-03-13 DIAGNOSIS — R00.2 PALPITATIONS: ICD-10-CM

## 2024-03-13 DIAGNOSIS — I10 BENIGN ESSENTIAL HYPERTENSION: ICD-10-CM

## 2024-03-13 NOTE — TELEPHONE ENCOUNTER
Patient called and states he is trying to get on the kidney transplant list, keyanna wants him to have a stress test prior.    I advised would discuss with JR and return call.

## 2024-03-26 ENCOUNTER — HOSPITAL ENCOUNTER (OUTPATIENT)
Dept: CARDIOLOGY | Facility: HOSPITAL | Age: 54
Discharge: HOME | End: 2024-03-26
Payer: MEDICARE

## 2024-03-26 DIAGNOSIS — R06.02 SHORTNESS OF BREATH: ICD-10-CM

## 2024-03-26 DIAGNOSIS — I25.708 CORONARY ARTERY DISEASE OF BYPASS GRAFT OF NATIVE HEART WITH STABLE ANGINA PECTORIS: Chronic | ICD-10-CM

## 2024-03-26 DIAGNOSIS — I10 BENIGN ESSENTIAL HYPERTENSION: ICD-10-CM

## 2024-03-26 DIAGNOSIS — R00.2 PALPITATIONS: ICD-10-CM

## 2024-03-26 PROCEDURE — A9500 TC99M SESTAMIBI: HCPCS | Performed by: INTERNAL MEDICINE

## 2024-03-26 PROCEDURE — 0 TECHNETIUM SESTAMIBI: Performed by: INTERNAL MEDICINE

## 2024-03-26 PROCEDURE — 25010000002 REGADENOSON 0.4 MG/5ML SOLUTION: Performed by: INTERNAL MEDICINE

## 2024-03-26 PROCEDURE — 78452 HT MUSCLE IMAGE SPECT MULT: CPT

## 2024-03-26 PROCEDURE — 93017 CV STRESS TEST TRACING ONLY: CPT

## 2024-03-26 RX ORDER — REGADENOSON 0.08 MG/ML
0.4 INJECTION, SOLUTION INTRAVENOUS
Status: COMPLETED | OUTPATIENT
Start: 2024-03-26 | End: 2024-03-26

## 2024-03-26 RX ADMIN — REGADENOSON 0.4 MG: 0.08 INJECTION, SOLUTION INTRAVENOUS at 10:17

## 2024-03-26 RX ADMIN — TECHNETIUM TC 99M SESTAMIBI 1 DOSE: 1 INJECTION INTRAVENOUS at 08:52

## 2024-03-26 RX ADMIN — TECHNETIUM TC 99M SESTAMIBI 1 DOSE: 1 INJECTION INTRAVENOUS at 10:17

## 2024-03-29 ENCOUNTER — TELEPHONE (OUTPATIENT)
Dept: CARDIOLOGY | Facility: CLINIC | Age: 54
End: 2024-03-29
Payer: MEDICARE

## 2024-03-29 LAB
BH CV REST NUCLEAR ISOTOPE DOSE: 10 MCI
BH CV STRESS COMMENTS STAGE 1: NORMAL
BH CV STRESS DOSE REGADENOSON STAGE 1: 0.4
BH CV STRESS DURATION MIN STAGE 1: 0
BH CV STRESS DURATION SEC STAGE 1: 10
BH CV STRESS NUCLEAR ISOTOPE DOSE: 30 MCI
BH CV STRESS PROTOCOL 1: NORMAL
BH CV STRESS RECOVERY BP: NORMAL MMHG
BH CV STRESS RECOVERY HR: 79 BPM
BH CV STRESS STAGE 1: 1
MAXIMAL PREDICTED HEART RATE: 167 BPM
PERCENT MAX PREDICTED HR: 50.9 %
STRESS BASELINE BP: NORMAL MMHG
STRESS BASELINE HR: 68 BPM
STRESS PERCENT HR: 60 %
STRESS POST PEAK BP: NORMAL MMHG
STRESS POST PEAK HR: 85 BPM
STRESS TARGET HR: 142 BPM

## 2024-03-29 NOTE — TELEPHONE ENCOUNTER
I called patient and let him know stress results:      Severely depressed EF 16%  Positive stress test 1 to 2-week follow-up.

## 2024-04-03 ENCOUNTER — OFFICE VISIT (OUTPATIENT)
Dept: CARDIOLOGY | Facility: CLINIC | Age: 54
End: 2024-04-03
Payer: MEDICARE

## 2024-04-03 VITALS
HEIGHT: 67 IN | SYSTOLIC BLOOD PRESSURE: 99 MMHG | WEIGHT: 192.6 LBS | DIASTOLIC BLOOD PRESSURE: 63 MMHG | BODY MASS INDEX: 30.23 KG/M2 | OXYGEN SATURATION: 93 % | HEART RATE: 84 BPM

## 2024-04-03 DIAGNOSIS — I48.0 PAROXYSMAL ATRIAL FIBRILLATION: ICD-10-CM

## 2024-04-03 DIAGNOSIS — R94.39 ABNORMAL NUCLEAR STRESS TEST: ICD-10-CM

## 2024-04-03 DIAGNOSIS — I10 BENIGN ESSENTIAL HYPERTENSION: ICD-10-CM

## 2024-04-03 DIAGNOSIS — E78.2 MIXED HYPERLIPIDEMIA: ICD-10-CM

## 2024-04-03 DIAGNOSIS — I25.84 CORONARY ATHEROSCLEROSIS DUE TO CALCIFIED CORONARY LESION: ICD-10-CM

## 2024-04-03 DIAGNOSIS — N18.6 END-STAGE RENAL DISEASE: ICD-10-CM

## 2024-04-03 DIAGNOSIS — E10.59 TYPE 1 DIABETES MELLITUS WITH OTHER CIRCULATORY COMPLICATION: ICD-10-CM

## 2024-04-03 DIAGNOSIS — I25.10 CORONARY ATHEROSCLEROSIS DUE TO CALCIFIED CORONARY LESION: ICD-10-CM

## 2024-04-03 DIAGNOSIS — I25.708 CORONARY ARTERY DISEASE OF BYPASS GRAFT OF NATIVE HEART WITH STABLE ANGINA PECTORIS: Primary | Chronic | ICD-10-CM

## 2024-04-03 PROBLEM — I25.5 ISCHEMIC CARDIOMYOPATHY: Status: ACTIVE | Noted: 2024-04-03

## 2024-04-03 RX ORDER — CETIRIZINE HYDROCHLORIDE 10 MG/1
10 TABLET ORAL DAILY
COMMUNITY

## 2024-04-03 RX ORDER — ICOSAPENT ETHYL 1000 MG/1
2 CAPSULE ORAL 2 TIMES DAILY WITH MEALS
COMMUNITY
Start: 2024-03-06

## 2024-04-03 NOTE — PROGRESS NOTES
Subjective   Follow up, CAD, HTN  Min Landon is a 53 y.o. male who presents to day for Follow-up (Abnormal stress test).    CHIEF COMPLIANT  Chief Complaint   Patient presents with    Follow-up     Abnormal stress test       Active Problems:  Problem List Items Addressed This Visit          Cardiac and Vasculature    Coronary artery disease of bypass graft of native heart with stable angina pectoris - Primary (Chronic)    Atherosclerosis of coronary artery    Benign essential hypertension    Overview     Formatting of this note might be different from the original.    Essential (primary) hypertension         HLD (hyperlipidemia)    Relevant Medications    Vascepa 1 g capsule capsule    Paroxysmal atrial fibrillation    Abnormal nuclear stress test       Endocrine and Metabolic    Type 1 diabetes       Genitourinary and Reproductive     End-stage renal disease    Overview     Formatting of this note might be different from the original.    End stage renal disease            HPI  HPI  Patient is currently discussed results of the stress test he is being evaluated for renal transplant at [facility] and actually he is seen cardiology yesterday at the [facility] who is going to repeat the echocardiogram as well as give him an event monitor because of history of atrial fibrillation, the patient has been off the Eliquis by Dr. RAMÍREZ because of excessive bleeding at the time he was placed on dual antiplatelet therapy and statin he himself does not have any palpitations at all he also denied any shortness of breath or chest pain no edema he is compliant with dialysis he is quit using the CPAP for a while it has been using oxygen but now is going to see Dr. Ma again to try to get the CPAP back he does not smoke  PRIOR MEDS  Current Outpatient Medications on File Prior to Visit   Medication Sig Dispense Refill    aspirin 81 MG EC tablet Take 1 tablet by mouth Daily.      cetirizine  (zyrTEC) 10 MG tablet Take 1 tablet by mouth Daily.      escitalopram (LEXAPRO) 20 MG tablet Take 1 tablet by mouth Daily.      fenofibrate 160 MG tablet Take 1 tablet by mouth Daily.      Insulin Aspart (novoLOG) 100 UNIT/ML injection Inject  under the skin into the appropriate area as directed. Sliding scale      insulin glargine (LANTUS, SEMGLEE) 100 UNIT/ML injection Inject 16 Units under the skin into the appropriate area as directed Daily.      Melatonin 10 MG capsule Take  by mouth.      multivitamin (THERAGRAN) tablet tablet Take 1 tablet by mouth Daily.      O2 (OXYGEN) Inhale 2 L/min Every Night.      oxyCODONE-acetaminophen (PERCOCET) 7.5-325 MG per tablet Take 1 tablet by mouth As Needed.      pantoprazole (PROTONIX) 40 MG EC tablet Take 1 tablet by mouth Daily.      predniSONE (DELTASONE) 5 MG tablet Take 1 tablet by mouth Every Other Day.      rosuvastatin (CRESTOR) 20 MG tablet TAKE 1 TABLET BY MOUTH DAILY 90 tablet 3    tacrolimus (PROGRAF) 1 MG capsule Take 0.5 mg by mouth 2 (Two) Times a Day.      Vascepa 1 g capsule capsule Take 2 g by mouth 2 (Two) Times a Day With Meals.      Velphoro 500 MG chewable tablet Chew 2 tablets 3 (Three) Times a Day As Needed.      clopidogrel (PLAVIX) 75 MG tablet Take 1 tablet by mouth Daily.       No current facility-administered medications on file prior to visit.       ALLERGIES  Bactrim [sulfamethoxazole-trimethoprim]    HISTORY  Problem list:     1.  Shortness of breath  1.1 stress test 11/21: Fixed moderate size perfusion defect lateral myocardium from the apex to the base involving portions of the anterior and inferior myocardium tiny component of reversibility at the apex of the myocardium.  Decreased wall motion at the apex and septum decreased myocardial wall thickening at the apex and mid lateral wall, EF slightly low at 48%  1.2 [facility], 1- per Dr. Saul. Unsuccessful attempt of  and revascularization of First Diag. Of the  LAD and First OM of  Left Circ., distal Left Circ. 50%, PLAD 70%. Medical management.  1.3 patient evaluated by  clinic in [place] and was found not to be a candidate for revascularization of his .  1.2 echocardiogram 1/22: EF 45 to 50% lateral hypokinesis  1.3 echocardiogram 4/22: EF 45 to 50%, dilated left atrium, trivial PI, trivial TR peak PA pressures of 48 mmHg, left pleural effusion, normal diastolic function  1.4  CABG 1/10/2023: LIMA to LAD and SVG to PDA  2.  Palpitations  3.  Diabetes mellitus  4.  Hyperlipidemia  5.  Hypertension  6.  Chronic kidney disease on dialysis, renal transplant list  7. Atrial fibrillation [id number] score of 4 on ASA     Past Medical History:   Diagnosis Date    A-fib     Arthritis     Calciphylaxis     Cancer     SKIN CANCER    CHF (congestive heart failure)     Chronic kidney disease     Coronary artery disease of bypass graft of native heart with stable angina pectoris 04/19/2022    Diabetes mellitus     Elevated cholesterol     End stage renal disease     M/W/F DIALYSIS    GERD (gastroesophageal reflux disease)     History of transfusion     SELF DONATED BLOOD IN 2005 AFTER BACK SURGERY AT [facility]    Hyperlipidemia     Hypertension     HISTORY OF NO LONGER REQUIRING MEDICATIONS DUE TO DIALYSIS    Lung injury     LUNG SCARING FROM COVID-19    Sleep apnea 08/09/2022    UNABLE TO TOLERATE WEARS 2L NC       Social History     Socioeconomic History    Marital status:    Tobacco Use    Smoking status: Never     Passive exposure: Past    Smokeless tobacco: Former   Vaping Use    Vaping status: Never Used   Substance and Sexual Activity    Alcohol use: Never    Drug use: Never    Sexual activity: Defer       Family History   Problem Relation Age of Onset    Sudden death Mother     Hypertension Father     Sudden death Father     Heart disease Father         UNKNOWN    Heart attack Father         UNKNOWN       Review of Systems   Constitutional:  Positive for fatigue. Negative for chills,  "diaphoresis and fever.   HENT: Negative.     Eyes: Negative.  Negative for visual disturbance.   Respiratory:  Positive for apnea (does not wear c-pap). Negative for cough, chest tightness, shortness of breath and wheezing.    Cardiovascular: Negative.  Negative for chest pain, palpitations and leg swelling.   Gastrointestinal: Negative.  Negative for abdominal pain and blood in stool.   Endocrine: Negative.    Genitourinary: Negative.  Negative for hematuria.   Musculoskeletal:  Positive for back pain. Negative for arthralgias, myalgias, neck pain and neck stiffness.   Skin: Negative.  Negative for rash and wound.   Allergic/Immunologic: Negative.  Negative for environmental allergies and food allergies.   Neurological: Negative.  Negative for dizziness, syncope, weakness, light-headedness, numbness and headaches.   Hematological: Negative.  Bruises/bleeds easily (bruises and bleeds easily).   Psychiatric/Behavioral:  Positive for sleep disturbance (sleep apnea).        Objective     VITALS: BP 99/63   Pulse 84   Ht 170.2 cm (67\")   Wt 87.4 kg (192 lb 9.6 oz)   SpO2 93%   BMI 30.17 kg/m²     LABS:   Lab Results (most recent)       None            IMAGING:   CT Chest Without Contrast Diagnostic    Result Date: 4/2/2024  IMPRESSION: Upper lobe predominant diffuse small pulmonary nodules with mediastinal and hilar lymph nodes, some with eggshell calcification, consistent with given history of sarcoidosis. Silicosis and coal workers pneumoconiosis may also have this appearance if there is an appropriate occupational history. Small right pleural effusion with bilateral lower lobe atelectasis. Report Verified by: Jay Roth MD at 4/2/2024 11:40 AM EDT    CT Abdomen Pelvis Without Contrast    Result Date: 2/20/2024  IMPRESSION: Severe atrophy of the native kidneys and moderate atrophy of the right lower quadrant renal transplant. Severe calcific atherosclerosis with moderate involvement of the common iliac " arteries and stents in the external iliac arteries limiting evaluation. Moderate stool burden. Small right pleural effusion with focal consolidation in the right lower lobe which is incompletely evaluated. Report Verified by: Jordan Davis MD at 2/20/2024 4:09 PM EST      EXAM:  Physical Exam  Constitutional:       Appearance: He is well-developed.   HENT:      Head: Normocephalic and atraumatic.   Eyes:      Pupils: Pupils are equal, round, and reactive to light.   Neck:      Thyroid: No thyromegaly.      Vascular: No JVD.   Cardiovascular:      Rate and Rhythm: Normal rate and regular rhythm.      Chest Wall: PMI is not displaced.      Pulses: Normal pulses.      Heart sounds: Normal heart sounds, S1 normal and S2 normal. No murmur heard.     No friction rub. No gallop.      Comments: 2/6 ejection systolic murmur heard best at the aortic area radiating across the sternal border and carotids  AV fistula of the left arm  Pulmonary:      Effort: Pulmonary effort is normal. No respiratory distress.      Breath sounds: Normal breath sounds. No stridor. No wheezing or rales.   Chest:      Chest wall: No tenderness.   Abdominal:      General: Bowel sounds are normal. There is no distension.      Palpations: Abdomen is soft. There is no mass.      Tenderness: There is no abdominal tenderness. There is no guarding or rebound.   Musculoskeletal:      Cervical back: Neck supple. No edema.   Skin:     General: Skin is warm and dry.      Coloration: Skin is not pale.      Findings: No erythema or rash.   Neurological:      Mental Status: He is alert and oriented to person, place, and time.      Cranial Nerves: No cranial nerve deficit.      Coordination: Coordination normal.   Psychiatric:         Behavior: Behavior normal.         Procedure   Procedures        Assessment & Plan     Diagnoses and all orders for this visit:    1. Coronary artery disease of bypass graft of native heart with stable angina pectoris (Primary)    2.  Abnormal nuclear stress test    3. Coronary atherosclerosis due to calcified coronary lesion    4. Benign essential hypertension    5. Paroxysmal atrial fibrillation    6. Mixed hyperlipidemia    7. Type 1 diabetes mellitus with other circulatory complication    8. End-stage renal disease    1.  Patient is again being evaluated for renal transplant at [facility] he was seen by cardiologist yesterday he is here to discuss results of stress test which showed infarction of the apex with also significant ischemia but more concerning poststress his ejection fraction has dropped about 16% he is denying any chest pain at the level of activity he has no shortness of breath no edema, discussed all this with him and he had coronary bypass grafting with 2 vessels with LIMA to LAD and also SVG graft to the RCA he also had 70% stenosis of circumflex artery on the cardiac atrium back in January 2023 but this was not bypassed as not clear if the vessel was too small for grafting, but we discussed all of the above issues and I agree with the cardiology evaluation in [place] that he will need to repeat the echocardiogram for assessment of the ejection fraction previous echocardiogram showed mostly mild systolic dysfunction and also I think probably the patient will need cardiac catheterization for evaluation of the SVG graft to the RCA as well as assessment of the circumflex artery and whether or not this is amiable for revascularization he has quit taking the Plavix for anticipation of transplant but as this is not going to be happening in the immediate future advised him to take the Plavix and aspirin for now pending cardiac catheterization and workup he was to have the cardiac catheterization at [facility] so we will defer this to the cardiologist seeing him at the [facility] who is going to follow-up with him  2.  Currently I cannot advance his GDMT medications because of relative  hypotension as well as end-stage renal disease  3.  He has history of paroxysmal atrial fibrillation he is going to have a monitor assessment apparently Dr. Barrera discontinue his Eliquis because of excessive bleeding prior to the surgery and has been discontinued since if the patient still having significant atrial fibrillation probably will require to be anticoagulated but again he is going to have monitor follow-up at the [facility] so we will await the results of the stress test the burden of atrial fibrillation especially does not he does not have any symptoms of palpitations  4.  He has not been using his CPAP Dr. Ma history see him advised him to go and see her because he will need to be back in CPAP management machine  5.  Regarding his lipid he has been on rosuvastatin we will continue with the  6.  At [facility] going to also work him up for sarcoidosis      Return in about 3 months (around 7/3/2024).                   MEDS ORDERED DURING VISIT:  No orders of the defined types were placed in this encounter.      As always, Diego Roberson MD  I appreciate very much the opportunity to participate in the cardiovascular care of your patients. Please do not hesitate to call me with any questions with regards to Min Landon evaluation and management.         This document has been electronically signed by Yayd Brumfield MD  April 3, 2024 13:48 EDT    This note is dictated utilizing voice recognition software.

## 2025-04-03 ENCOUNTER — OFFICE VISIT (OUTPATIENT)
Dept: CARDIOLOGY | Facility: CLINIC | Age: 55
End: 2025-04-03
Payer: MEDICARE

## 2025-04-03 VITALS
DIASTOLIC BLOOD PRESSURE: 81 MMHG | HEIGHT: 67 IN | OXYGEN SATURATION: 99 % | BODY MASS INDEX: 29.07 KG/M2 | SYSTOLIC BLOOD PRESSURE: 128 MMHG | WEIGHT: 185.2 LBS | HEART RATE: 76 BPM

## 2025-04-03 DIAGNOSIS — I48.0 PAROXYSMAL ATRIAL FIBRILLATION: ICD-10-CM

## 2025-04-03 DIAGNOSIS — I25.708 CORONARY ARTERY DISEASE OF BYPASS GRAFT OF NATIVE HEART WITH STABLE ANGINA PECTORIS: Primary | ICD-10-CM

## 2025-04-03 DIAGNOSIS — E78.2 MIXED HYPERLIPIDEMIA: ICD-10-CM

## 2025-04-03 DIAGNOSIS — I50.22 CHRONIC HFREF (HEART FAILURE WITH REDUCED EJECTION FRACTION): ICD-10-CM

## 2025-04-03 DIAGNOSIS — I10 BENIGN ESSENTIAL HYPERTENSION: ICD-10-CM

## 2025-04-03 DIAGNOSIS — Z95.810 ICD (IMPLANTABLE CARDIOVERTER-DEFIBRILLATOR), DUAL, IN SITU: ICD-10-CM

## 2025-04-03 RX ORDER — INSULIN HUMAN 500 [IU]/ML
INJECTION, SOLUTION SUBCUTANEOUS
COMMUNITY

## 2025-04-03 RX ORDER — MYCOPHENOLATE MOFETIL 500 MG/1
500 TABLET ORAL DAILY
COMMUNITY

## 2025-04-03 RX ORDER — MIDODRINE HYDROCHLORIDE 10 MG/1
10 TABLET ORAL 3 TIMES DAILY PRN
COMMUNITY

## 2025-04-03 RX ORDER — TIRZEPATIDE 10 MG/.5ML
INJECTION, SOLUTION SUBCUTANEOUS
COMMUNITY

## 2025-04-03 NOTE — PROGRESS NOTES
Subjective     Min Landon is a 54 y.o. male who presents to day for Follow-up, Atrial Fibrillation (Had Defib placed in 12/17/2024 [employer] .), and Coronary Artery Disease.    CHIEF COMPLIANT  Chief Complaint   Patient presents with    Follow-up    Atrial Fibrillation     Had Defib placed in 12/17/2024 [employer] .    Coronary Artery Disease       Active Problems:  Problem List Items Addressed This Visit          Cardiac and Vasculature    Coronary artery disease of bypass graft of native heart with stable angina pectoris - Primary (Chronic)    Relevant Medications    midodrine (PROAMATINE) 10 MG tablet    Other Relevant Orders    ECG 12 Lead    Benign essential hypertension    Overview   Formatting of this note might be different from the original.    Essential (primary) hypertension         Relevant Medications    midodrine (PROAMATINE) 10 MG tablet    Other Relevant Orders    ECG 12 Lead    HLD (hyperlipidemia)    Relevant Orders    ECG 12 Lead    Paroxysmal atrial fibrillation    Relevant Medications    midodrine (PROAMATINE) 10 MG tablet    Other Relevant Orders    ECG 12 Lead     Other Visit Diagnoses         Chronic HFrEF (heart failure with reduced ejection fraction)        Relevant Medications    midodrine (PROAMATINE) 10 MG tablet    Other Relevant Orders    ECG 12 Lead      ICD (implantable cardioverter-defibrillator), dual, in situ        Relevant Orders    ECG 12 Lead        Problem list:     1.  Shortness of breath  1.2 echocardiogram 1/22: EF 45 to 50% lateral hypokinesis  1.3 echocardiogram 4/22: EF 45 to 50%, dilated left atrium, trivial PI, trivial TR peak PA pressures of 48 mmHg, left pleural effusion, normal diastolic function  1.4 echocardiogram 4/24: EF 35 to 40% with moderate diffuse hypokinesis  2.  Coronary artery disease   2.1 [facility], 1- per Dr. Saul. Unsuccessful attempt of  and revascularization of First Diag. Of the  LAD and First OM of Left Circ., distal Left Circ. 50%,  PLAD 70%. Medical management.  2.2 patient evaluated by  clinic in [place] and was found not to be a candidate for revascularization of his .  2.3 CABG 1/10/2023: LIMA to LAD and SVG to PDA  2.4 stress test 3/24:Scintigraphy demonstrates a dense fixed anterolateral wall and apical defect compatible with infarct.  There is a moderate degree of ольга-infarct ischemia in the basilar lateral wall, and the inferolateral lateral segment, the apex is completely fixed as is the inferoapical segment. Severely depressed post-rest ejection fraction of 16% with wall motion abnormalities corresponding to the above.  The inferior wall is akinetic, the distal inferoseptal segment is dyskinetic.  2.  Palpitations  3.  Diabetes mellitus  4.  Hyperlipidemia  5.  Hypertension  6.  Chronic kidney disease on dialysis, renal transplant list  7. Atrial fibrillation [id number] score of 4 on ASA  8.  [employer] ICD in place December 2024    HPI  HPI  Mr. Min Landon is a 54-year-old male patient who is being followed up today for coronary artery disease and heart failure with reduced ejection fraction.    Patient does have an extensive history of coronary artery disease.  Patient does have a history of previous stenting as well as coronary artery bypass grafting in 2023 in which she went under LIMA to LAD and SVG to PDA.  He is on dual antiplatelet therapy of aspirin and Plavix and high intensity statin therapy of rosuvastatin.     Patient does have chronic arterial hypertension in which his blood pressure is 128/81 heart rate of 76.    Patient does have a history of heart failure with reduced ejection fraction and which he recently received a defibrillator in December 2024 at the [facility].  He has not been able to tolerate heart failure medications due to hypotension mainly associated with dialysis.  He is having to take midodrine to help keep his blood pressure up during dialysis.    Patient does have a history  of atrial fibrillation in which she has a [id number] score of 4.Per chart review, Eliquis was discontinued and replaced with Plavix and aspirin by Dr. Woodruff to decrease his bleeding risk in the setting of dialysis, as he was in normal sinus rhythm. He noted excessive bleeding in the past while taking Eliquis on an occasion when he required stitches in the emergency room.    Scintigraphy demonstrates a dense fixed anterolateral wall and apical defect compatible with infarct.  There is a moderate degree of ольга-infarct ischemia in the basilar lateral wall, and the inferolateral lateral segment, the apex is completely fixed as is the inferoapical segment. Severely depressed post-rest ejection fraction of 16% with wall motion abnormalities corresponding to the above.  The inferior wall is akinetic, the distal inferoseptal segment is dyskinetic.  He was seen at the [facility] for further evaluation and went under repeat echocardiogram that showed an ejection fraction of 35 to 40% but indicated that it was felt to be even less than that and he received a [employer] ICD.  The wound is well-healed.    Patient did have 1 episode after dialysis which his blood pressure dropped and he had a syncopal episode.  This has not reoccurred.  This is thought to be more associated with hypotension.  He does have some fatigue where he is not been very active.  He is pretty much stays tired due to dialysis.    Patient denies any chest pain, shortness of breath, palpitations, lower extremity edema,  syncope, PND, orthopnea, or strokelike symptoms.  PRIOR MEDS  Current Outpatient Medications on File Prior to Visit   Medication Sig Dispense Refill    aspirin 81 MG EC tablet Take 1 tablet by mouth Daily.      cetirizine (zyrTEC) 10 MG tablet Take 1 tablet by mouth Daily.      clopidogrel (PLAVIX) 75 MG tablet Take 1 tablet by mouth Daily.      escitalopram (LEXAPRO) 20 MG tablet Take 1 tablet by mouth Daily.      fenofibrate  160 MG tablet Take 1 tablet by mouth Daily.      Insulin Regular Human CONCENTRATED (HumuLIN R U-500 KwikPen) 500 UNIT/ML solution pen-injector injection Inject  under the skin into the appropriate area as directed 3 (Three) Times a Day Before Meals. 2-4 units      midodrine (PROAMATINE) 10 MG tablet Take 1 tablet by mouth 3 (Three) Times a Day As Needed.      multivitamin (THERAGRAN) tablet tablet Take 1 tablet by mouth Daily.      mycophenolate (CELLCEPT) 500 MG tablet Take 1 tablet by mouth Daily.      O2 (OXYGEN) Inhale 2 L/min Every Night.      oxyCODONE-acetaminophen (PERCOCET) 7.5-325 MG per tablet Take 1 tablet by mouth As Needed.      pantoprazole (PROTONIX) 40 MG EC tablet Take 1 tablet by mouth Daily.      rosuvastatin (CRESTOR) 20 MG tablet TAKE 1 TABLET BY MOUTH DAILY 90 tablet 3    tacrolimus (PROGRAF) 1 MG capsule Take 0.5 mg by mouth 2 (Two) Times a Day.      Tirzepatide (Mounjaro) 10 MG/0.5ML solution auto-injector Inject  under the skin into the appropriate area as directed.      [DISCONTINUED] Insulin Aspart (novoLOG) 100 UNIT/ML injection Inject  under the skin into the appropriate area as directed. Sliding scale      [DISCONTINUED] insulin glargine (LANTUS, SEMGLEE) 100 UNIT/ML injection Inject 16 Units under the skin into the appropriate area as directed Daily.      [DISCONTINUED] Melatonin 10 MG capsule Take  by mouth.      [DISCONTINUED] predniSONE (DELTASONE) 5 MG tablet Take 1 tablet by mouth Every Other Day.      [DISCONTINUED] Vascepa 1 g capsule capsule Take 2 g by mouth 2 (Two) Times a Day With Meals.      [DISCONTINUED] Velphoro 500 MG chewable tablet Chew 2 tablets 3 (Three) Times a Day As Needed.       No current facility-administered medications on file prior to visit.       ALLERGIES  Bactrim [sulfamethoxazole-trimethoprim]    HISTORY  Past Medical History:   Diagnosis Date    A-fib     Arthritis     Calciphylaxis     Cancer     SKIN CANCER    CHF (congestive heart failure)      Chronic kidney disease     Coronary artery disease of bypass graft of native heart with stable angina pectoris 04/19/2022    Diabetes mellitus     Elevated cholesterol     End stage renal disease     M/W/F DIALYSIS    GERD (gastroesophageal reflux disease)     History of transfusion     SELF DONATED BLOOD IN 2005 AFTER BACK SURGERY AT [facility]    Hyperlipidemia     Hypertension     HISTORY OF NO LONGER REQUIRING MEDICATIONS DUE TO DIALYSIS    Lung injury     LUNG SCARING FROM COVID-19    Sleep apnea 08/09/2022    UNABLE TO TOLERATE WEARS 2L NC       Social History     Socioeconomic History    Marital status:    Tobacco Use    Smoking status: Never     Passive exposure: Past    Smokeless tobacco: Former   Vaping Use    Vaping status: Never Used   Substance and Sexual Activity    Alcohol use: Never    Drug use: Never    Sexual activity: Defer       Family History   Problem Relation Age of Onset    Sudden death Mother     Hypertension Father     Sudden death Father     Heart disease Father         UNKNOWN    Heart attack Father         UNKNOWN       Review of Systems   Constitutional:  Negative for chills, fatigue and fever.   HENT:  Negative for congestion, rhinorrhea and sore throat.    Eyes:  Negative for visual disturbance.   Respiratory:  Positive for apnea (CPAP uses O2 @ 2 liters). Negative for chest tightness and shortness of breath.    Cardiovascular:  Negative for chest pain, palpitations and leg swelling.   Gastrointestinal:  Negative for constipation, diarrhea and nausea.   Musculoskeletal:  Positive for arthralgias, back pain and neck pain.   Skin:  Positive for wound (has places on arms and hands). Negative for rash.   Allergic/Immunologic: Positive for environmental allergies. Negative for food allergies.   Neurological:  Positive for dizziness (standing up dyalysis days), syncope (blacked out after dyalsis), weakness and light-headedness.   Hematological:  Bruises/bleeds easily.  "  Psychiatric/Behavioral:  Negative for sleep disturbance.        Objective     VITALS: /81 (BP Location: Right arm, Patient Position: Sitting, Cuff Size: Adult)   Pulse 76   Ht 170.2 cm (67.01\")   Wt 84 kg (185 lb 3.2 oz)   SpO2 99%   BMI 29.00 kg/m²     LABS:   Lab Results (most recent)       None            IMAGING:   XR Chest PA & Lateral  Result Date: 12/18/2024  IMPRESSION: Right-sided cardiac conduction device in place. No pneumothorax. Suspect mild pulmonary edema. Report Verified by: Tim Hayden DO at 12/18/2024 8:14 AM EST    XR Chest 1 View  Result Date: 12/17/2024  IMPRESSION: No pneumothorax. Suspected mild pulmonary edema. Report Verified by: Delvin Cason MD at 12/17/2024 7:31 PM EST      EXAM:  Physical Exam  Vitals and nursing note reviewed.   Constitutional:       Appearance: He is well-developed.   HENT:      Head: Normocephalic.   Neck:      Thyroid: No thyroid mass.      Vascular: No carotid bruit or JVD.      Trachea: Trachea and phonation normal.   Cardiovascular:      Rate and Rhythm: Normal rate and regular rhythm.      Pulses:           Radial pulses are 2+ on the right side and 2+ on the left side.        Posterior tibial pulses are 2+ on the right side and 2+ on the left side.      Heart sounds: Murmur heard.      No friction rub. No gallop.   Pulmonary:      Effort: Pulmonary effort is normal. No respiratory distress.      Breath sounds: Normal breath sounds. No wheezing or rales.   Musculoskeletal:         General: No swelling (Compression stockings in place). Normal range of motion.      Cervical back: Neck supple.   Skin:     General: Skin is warm and dry.      Capillary Refill: Capillary refill takes less than 2 seconds.      Findings: No rash.      Comments: Well-healed ICD insertion site on right chest keloid scarring   Neurological:      Mental Status: He is alert and oriented to person, place, and time.   Psychiatric:         Speech: Speech normal.         " Behavior: Behavior normal.         Thought Content: Thought content normal.         Judgment: Judgment normal.         Procedure     ECG 12 Lead    Date/Time: 4/3/2025 9:01 AM  Performed by: Manfred Ascencio APRN    Authorized by: Manfred Ascencio APRN  Comparison: compared with previous ECG from 12/7/2023  Comparison to previous ECG: Changes in the axis of lead V5 and V4 T wave inversion lead III  Rhythm: sinus rhythm  Rate: normal  BPM: 75  QRS axis: right  Other findings: non-specific ST-T wave changes  Comments: QTc 448 ms  No acute changes             Assessment & Plan    Diagnosis Plan   1. Coronary artery disease of bypass graft of native heart with stable angina pectoris  ECG 12 Lead      2. Benign essential hypertension  ECG 12 Lead      3. Paroxysmal atrial fibrillation  ECG 12 Lead      4. Mixed hyperlipidemia  ECG 12 Lead      5. Chronic HFrEF (heart failure with reduced ejection fraction)  ECG 12 Lead      6. ICD (implantable cardioverter-defibrillator), dual, in situ  ECG 12 Lead      1.  Patient does have advanced coronary artery disease with history of coronary artery bypass grafting.  He currently denies any angina anginal equivalent symptoms other than fatigue.  Will continue to monitor at this time.  2.  Patient does have a history of hypertension however all medications have been discontinued and he seems to be doing well.  Today's blood pressure is well-controlled without medication.  He does have to take midodrine to supplement his blood pressure while in dialysis.  3.  Patient does have a history of atrial fibrillation in which she has had no reoccurrence episodes to his knowledge.  His device interrogation also indicated no reoccurrence of A-fib as well.  4.  Patient does have chronic heart failure with reduced ejection fraction which he recently received a [employer] ICD.  5.  Informed of signs and symptoms of ACS and advised to seek emergent treatment for any new worsening symptoms.   Patient also advised sooner follow-up as needed.  Also advised to follow-up with family doctor as needed  This note is dictated utilizing voice recognition software.  Although this record has been proof read, transcriptional errors may still be present. If questions occur regarding the content of this record please do not hesitate to call our office.  I have reviewed and confirmed the accuracy of the ROS as documented by the MA/LPN/RN RASHAD Garza    Return in about 6 months (around 10/3/2025), or if symptoms worsen or fail to improve.    Diagnoses and all orders for this visit:    1. Coronary artery disease of bypass graft of native heart with stable angina pectoris (Primary)  -     ECG 12 Lead    2. Benign essential hypertension  -     ECG 12 Lead    3. Paroxysmal atrial fibrillation  -     ECG 12 Lead    4. Mixed hyperlipidemia  -     ECG 12 Lead    5. Chronic HFrEF (heart failure with reduced ejection fraction)  -     ECG 12 Lead    6. ICD (implantable cardioverter-defibrillator), dual, in situ  -     ECG 12 Lead        Min Landon  reports that he has never smoked. He has been exposed to tobacco smoke. He has quit using smokeless tobacco. I have educated him on the risk of diseases from using tobacco products.                    MEDS ORDERED DURING VIBMI is >= 30 and <35. (Class 1 Obesity). The following options were offered after discussion;: exercise counseling/recommendations and nutrition counseling/recommendationsSIT:  No orders of the defined types were placed in this encounter.          This document has been electronically signed by RASHAD Garza Jr.  April 3, 2025 09:38 EDT

## (undated) DEVICE — SNAP KOVER: Brand: UNBRANDED

## (undated) DEVICE — THE MILL DISPOSABLE - FINE

## (undated) DEVICE — TBG PENCL TELESCP MEGADYNE SMOKE EVAC 10FT

## (undated) DEVICE — MEDI-VAC YANKAUER SUCTION HANDLE: Brand: CARDINAL HEALTH

## (undated) DEVICE — NEEDLE,HYPODERM,SAFETY, 22GX1.5: Brand: MEDLINE

## (undated) DEVICE — KT DRN EVAC WND PVC PCH WTROC RND 10F400

## (undated) DEVICE — 3M™ MEDIPORE™ H SOFT CLOTH SURGICAL TAPE, 2863, 3 IN X 10 YD, 12/CASE: Brand: 3M™ MEDIPORE™

## (undated) DEVICE — TRAP FLD MINIVAC MEGADYNE 100ML

## (undated) DEVICE — 3.0MM PRECISION NEURO (MATCH HEAD)

## (undated) DEVICE — SYR SLP TP 10ML DISP

## (undated) DEVICE — PK SPINE ORTHO 10

## (undated) DEVICE — ANTIBACTERIAL UNDYED BRAIDED (POLYGLACTIN 910), SYNTHETIC ABSORBABLE SUTURE: Brand: COATED VICRYL

## (undated) DEVICE — GLV SURG SENSICARE PI ORTHO SZ9 LF STRL

## (undated) DEVICE — ADAPT LUER STUB INTRAMEDIC PE/200 17G

## (undated) DEVICE — PATIENT RETURN ELECTRODE, SINGLE-USE, CONTACT QUALITY MONITORING, ADULT, WITH 9FT CORD, FOR PATIENTS WEIGING OVER 33LBS. (15KG): Brand: MEGADYNE

## (undated) DEVICE — BLANKT WARM UPPR/BDY ARM/OUT 57X196CM

## (undated) DEVICE — PAD ARMBRD SURG CONVOL 7.5X20X2IN

## (undated) DEVICE — GAUZE,SPONGE,4"X4",16PLY,XRAY,STRL,LF: Brand: MEDLINE

## (undated) DEVICE — GOWN,SIRUS,POLYRNF,XLN/3XL,18/CS: Brand: MEDLINE

## (undated) DEVICE — DRP C/ARMOR

## (undated) DEVICE — DELIVERY SYSTEM DISPENSER: Brand: VIVIGEN MIS DELIVERY SYSTEM

## (undated) DEVICE — PK ATS CUST W CARDIOTOMY RESEVOIR

## (undated) DEVICE — DIFFUSER: Brand: CORE, MAESTRO

## (undated) DEVICE — CAUTERY TIP POLISHER: Brand: DEVON

## (undated) DEVICE — ELECTRD BLD EXT EDGE 1P COAT 4IN STRL

## (undated) DEVICE — OIL CARTRIDGE: Brand: CORE, MAESTRO

## (undated) DEVICE — CANN OPN VIPER DISP STRL

## (undated) DEVICE — 450 ML BOTTLE OF 0.05% CHLORHEXIDINE GLUCONATE IN 99.95% STERILE WATER FOR IRRIGATION, USP AND APPLICATOR.: Brand: IRRISEPT ANTIMICROBIAL WOUND LAVAGE

## (undated) DEVICE — ELECTRD BLD EZ CLN STD 4IN